# Patient Record
Sex: FEMALE | Race: WHITE | Employment: OTHER | URBAN - NONMETROPOLITAN AREA
[De-identification: names, ages, dates, MRNs, and addresses within clinical notes are randomized per-mention and may not be internally consistent; named-entity substitution may affect disease eponyms.]

---

## 2017-05-10 DIAGNOSIS — F32.A DEPRESSION, UNSPECIFIED DEPRESSION TYPE: ICD-10-CM

## 2017-05-10 DIAGNOSIS — F41.9 ANXIETY: ICD-10-CM

## 2017-05-10 DIAGNOSIS — E78.5 HYPERLIPIDEMIA, UNSPECIFIED HYPERLIPIDEMIA TYPE: Primary | ICD-10-CM

## 2017-05-10 RX ORDER — ATORVASTATIN CALCIUM 20 MG/1
20 TABLET, FILM COATED ORAL DAILY
Qty: 90 TABLET | Refills: 1 | Status: SHIPPED | OUTPATIENT
Start: 2017-05-10 | End: 2017-11-24 | Stop reason: SDUPTHER

## 2017-05-10 RX ORDER — LORAZEPAM 1 MG/1
TABLET ORAL
Qty: 40 TABLET | Refills: 0 | Status: SHIPPED | OUTPATIENT
Start: 2017-05-10 | End: 2017-10-10 | Stop reason: SDUPTHER

## 2017-05-10 RX ORDER — BUPROPION HYDROCHLORIDE 150 MG/1
150 TABLET, EXTENDED RELEASE ORAL 2 TIMES DAILY
Qty: 60 TABLET | Refills: 0 | Status: SHIPPED | OUTPATIENT
Start: 2017-05-10 | End: 2017-05-31 | Stop reason: DRUGHIGH

## 2017-05-31 ENCOUNTER — OFFICE VISIT (OUTPATIENT)
Dept: FAMILY MEDICINE CLINIC | Age: 62
End: 2017-05-31
Payer: COMMERCIAL

## 2017-05-31 VITALS
SYSTOLIC BLOOD PRESSURE: 140 MMHG | OXYGEN SATURATION: 96 % | BODY MASS INDEX: 50.02 KG/M2 | WEIGHT: 293 LBS | DIASTOLIC BLOOD PRESSURE: 90 MMHG | HEIGHT: 64 IN | HEART RATE: 84 BPM

## 2017-05-31 DIAGNOSIS — R10.2 PELVIC PAIN IN FEMALE: ICD-10-CM

## 2017-05-31 DIAGNOSIS — Z12.11 ENCOUNTER FOR SCREENING COLONOSCOPY: Primary | ICD-10-CM

## 2017-05-31 DIAGNOSIS — F32.A DEPRESSION, UNSPECIFIED DEPRESSION TYPE: ICD-10-CM

## 2017-05-31 DIAGNOSIS — M25.511 ACUTE PAIN OF RIGHT SHOULDER: ICD-10-CM

## 2017-05-31 PROCEDURE — 99396 PREV VISIT EST AGE 40-64: CPT | Performed by: FAMILY MEDICINE

## 2017-05-31 RX ORDER — BUPROPION HYDROCHLORIDE 300 MG/1
300 TABLET ORAL EVERY MORNING
Qty: 90 TABLET | Refills: 0 | Status: SHIPPED | OUTPATIENT
Start: 2017-05-31 | End: 2017-09-07 | Stop reason: SDUPTHER

## 2017-05-31 RX ORDER — PREDNISONE 20 MG/1
TABLET ORAL
Qty: 15 TABLET | Refills: 0 | Status: SHIPPED | OUTPATIENT
Start: 2017-05-31 | End: 2018-06-06 | Stop reason: ALTCHOICE

## 2017-05-31 ASSESSMENT — PATIENT HEALTH QUESTIONNAIRE - PHQ9
1. LITTLE INTEREST OR PLEASURE IN DOING THINGS: 0
2. FEELING DOWN, DEPRESSED OR HOPELESS: 0
SUM OF ALL RESPONSES TO PHQ9 QUESTIONS 1 & 2: 0
SUM OF ALL RESPONSES TO PHQ QUESTIONS 1-9: 0

## 2017-06-14 DIAGNOSIS — M25.511 CHRONIC PAIN OF BOTH SHOULDERS: Primary | ICD-10-CM

## 2017-06-14 DIAGNOSIS — M25.512 CHRONIC PAIN OF BOTH SHOULDERS: Primary | ICD-10-CM

## 2017-06-14 DIAGNOSIS — G89.29 CHRONIC PAIN OF BOTH SHOULDERS: Primary | ICD-10-CM

## 2017-06-15 DIAGNOSIS — M25.511 CHRONIC RIGHT SHOULDER PAIN: Primary | ICD-10-CM

## 2017-06-15 DIAGNOSIS — G89.29 CHRONIC RIGHT SHOULDER PAIN: Primary | ICD-10-CM

## 2017-06-21 ENCOUNTER — OFFICE VISIT (OUTPATIENT)
Dept: ORTHOPEDIC SURGERY | Age: 62
End: 2017-06-21
Payer: COMMERCIAL

## 2017-06-21 VITALS
WEIGHT: 293 LBS | DIASTOLIC BLOOD PRESSURE: 78 MMHG | BODY MASS INDEX: 51.91 KG/M2 | HEIGHT: 63 IN | HEART RATE: 88 BPM | SYSTOLIC BLOOD PRESSURE: 148 MMHG

## 2017-06-21 DIAGNOSIS — M75.101 ROTATOR CUFF SYNDROME OF RIGHT SHOULDER: Primary | ICD-10-CM

## 2017-06-21 PROCEDURE — 99203 OFFICE O/P NEW LOW 30 MIN: CPT | Performed by: FAMILY MEDICINE

## 2017-07-08 DIAGNOSIS — I10 ESSENTIAL HYPERTENSION: Primary | ICD-10-CM

## 2017-07-10 RX ORDER — LOSARTAN POTASSIUM 25 MG/1
TABLET ORAL
Qty: 90 TABLET | Refills: 3 | Status: SHIPPED | OUTPATIENT
Start: 2017-07-10 | End: 2018-10-27 | Stop reason: SDUPTHER

## 2017-08-24 DIAGNOSIS — E78.5 HYPERLIPIDEMIA, UNSPECIFIED HYPERLIPIDEMIA TYPE: ICD-10-CM

## 2017-08-24 RX ORDER — ATORVASTATIN CALCIUM 20 MG/1
20 TABLET, FILM COATED ORAL DAILY
Qty: 90 TABLET | Refills: 1 | Status: CANCELLED | OUTPATIENT
Start: 2017-08-24

## 2017-09-07 DIAGNOSIS — F32.A DEPRESSION, UNSPECIFIED DEPRESSION TYPE: ICD-10-CM

## 2017-09-08 RX ORDER — BUPROPION HYDROCHLORIDE 300 MG/1
300 TABLET ORAL EVERY MORNING
Qty: 90 TABLET | Refills: 0 | Status: SHIPPED | OUTPATIENT
Start: 2017-09-08 | End: 2017-12-21 | Stop reason: SDUPTHER

## 2017-10-05 DIAGNOSIS — R60.0 BILATERAL EDEMA OF LOWER EXTREMITY: ICD-10-CM

## 2017-10-06 RX ORDER — FUROSEMIDE 20 MG/1
TABLET ORAL
Qty: 90 TABLET | Refills: 1 | Status: SHIPPED | OUTPATIENT
Start: 2017-10-06 | End: 2018-06-06 | Stop reason: SDUPTHER

## 2017-10-10 DIAGNOSIS — F41.9 ANXIETY: ICD-10-CM

## 2017-10-10 RX ORDER — LORAZEPAM 1 MG/1
TABLET ORAL
Qty: 40 TABLET | Refills: 0 | Status: SHIPPED | OUTPATIENT
Start: 2017-10-10 | End: 2018-06-06 | Stop reason: SDUPTHER

## 2017-10-10 NOTE — TELEPHONE ENCOUNTER
Pt tom'd her 8-30-17 appt d/t transportation problems & has not re-scheduled. OARRS reviewed. Lorazepam last filled 5-16-17 for #40.

## 2017-10-10 NOTE — TELEPHONE ENCOUNTER
Controlled Substances Monitoring:     Attestation: The Prescription Monitoring Report for this patient was reviewed today.  (Darryn Medrano, DO)  Documentation: No signs of potential drug abuse or diversion identified. (Darryn Medrano, DO)

## 2017-11-09 ENCOUNTER — TELEPHONE (OUTPATIENT)
Dept: PRIMARY CARE CLINIC | Age: 62
End: 2017-11-09

## 2017-11-09 DIAGNOSIS — Z12.31 SCREENING MAMMOGRAM, ENCOUNTER FOR: Primary | ICD-10-CM

## 2017-11-24 DIAGNOSIS — E78.5 HYPERLIPIDEMIA, UNSPECIFIED HYPERLIPIDEMIA TYPE: ICD-10-CM

## 2017-11-27 RX ORDER — ATORVASTATIN CALCIUM 20 MG/1
TABLET, FILM COATED ORAL
Qty: 90 TABLET | Refills: 1 | Status: SHIPPED | OUTPATIENT
Start: 2017-11-27 | End: 2018-07-12 | Stop reason: SDUPTHER

## 2017-11-27 NOTE — TELEPHONE ENCOUNTER
Called the patient to inform her that she is due for an appointment. She stated that she does not want to schedule at this time for finical reasons.

## 2017-12-15 DIAGNOSIS — F32.A DEPRESSION, UNSPECIFIED DEPRESSION TYPE: ICD-10-CM

## 2017-12-15 DIAGNOSIS — I10 ESSENTIAL HYPERTENSION, BENIGN: ICD-10-CM

## 2017-12-15 NOTE — TELEPHONE ENCOUNTER
Called the patient to see if she had enough atenolol to last over the weekend and for the 10 days it takes from the mail order since the last prescription was sent in 09/2016 for 90 tablets and 3 refills. Appears the patient is not taking the medication, or not taking it as directed.

## 2017-12-20 RX ORDER — ATENOLOL 50 MG/1
TABLET ORAL
Qty: 90 TABLET | Refills: 3 | OUTPATIENT
Start: 2017-12-20

## 2017-12-20 RX ORDER — BUPROPION HYDROCHLORIDE 300 MG/1
TABLET ORAL
Qty: 90 TABLET | Refills: 0 | OUTPATIENT
Start: 2017-12-20

## 2017-12-21 RX ORDER — BUPROPION HYDROCHLORIDE 300 MG/1
300 TABLET ORAL EVERY MORNING
Qty: 14 TABLET | Refills: 0 | Status: SHIPPED | OUTPATIENT
Start: 2017-12-21 | End: 2017-12-21 | Stop reason: SDUPTHER

## 2017-12-21 RX ORDER — ATENOLOL 50 MG/1
50 TABLET ORAL DAILY
Qty: 14 TABLET | Refills: 0 | Status: SHIPPED | OUTPATIENT
Start: 2017-12-21 | End: 2017-12-21 | Stop reason: SDUPTHER

## 2017-12-21 RX ORDER — BUPROPION HYDROCHLORIDE 300 MG/1
300 TABLET ORAL EVERY MORNING
Qty: 90 TABLET | Refills: 0 | Status: SHIPPED | OUTPATIENT
Start: 2017-12-21 | End: 2018-04-27 | Stop reason: SDUPTHER

## 2017-12-21 RX ORDER — ATENOLOL 50 MG/1
TABLET ORAL
Qty: 90 TABLET | Refills: 0 | Status: SHIPPED | OUTPATIENT
Start: 2017-12-21 | End: 2018-04-17 | Stop reason: SDUPTHER

## 2017-12-21 NOTE — TELEPHONE ENCOUNTER
Patient called back. States she only has 3 tablets left. She is taking the atenolol. Patient's  insurance changed and everything was messed up with [facility]. She said she received 3 or 4 bottles at once d/t the mess up. Also looks as if patient had received a 90 day supply and no refills of the Wellbutrin. Atenolol had 90 days with 3 refills which would give her a 1 year supply. She would is due. Please send in short term script to [facility]. Long term scripts for both to [facility].

## 2018-04-17 DIAGNOSIS — I10 ESSENTIAL HYPERTENSION, BENIGN: ICD-10-CM

## 2018-04-17 RX ORDER — ATENOLOL 50 MG/1
TABLET ORAL
Qty: 90 TABLET | Refills: 0 | Status: SHIPPED | OUTPATIENT
Start: 2018-04-17 | End: 2018-07-12 | Stop reason: SDUPTHER

## 2018-04-27 DIAGNOSIS — F32.A DEPRESSION, UNSPECIFIED DEPRESSION TYPE: ICD-10-CM

## 2018-04-27 RX ORDER — BUPROPION HYDROCHLORIDE 300 MG/1
TABLET ORAL
Qty: 90 TABLET | Refills: 0 | Status: SHIPPED | OUTPATIENT
Start: 2018-04-27 | End: 2018-07-12 | Stop reason: SDUPTHER

## 2018-06-06 ENCOUNTER — OFFICE VISIT (OUTPATIENT)
Dept: FAMILY MEDICINE CLINIC | Age: 63
End: 2018-06-06
Payer: COMMERCIAL

## 2018-06-06 VITALS
BODY MASS INDEX: 56.05 KG/M2 | HEART RATE: 77 BPM | TEMPERATURE: 97.9 F | DIASTOLIC BLOOD PRESSURE: 88 MMHG | SYSTOLIC BLOOD PRESSURE: 128 MMHG | WEIGHT: 293 LBS | RESPIRATION RATE: 16 BRPM | OXYGEN SATURATION: 99 %

## 2018-06-06 DIAGNOSIS — G89.29 CHRONIC BILATERAL LOW BACK PAIN, WITH SCIATICA PRESENCE UNSPECIFIED: ICD-10-CM

## 2018-06-06 DIAGNOSIS — R60.0 BILATERAL EDEMA OF LOWER EXTREMITY: ICD-10-CM

## 2018-06-06 DIAGNOSIS — M54.5 CHRONIC BILATERAL LOW BACK PAIN, WITH SCIATICA PRESENCE UNSPECIFIED: ICD-10-CM

## 2018-06-06 DIAGNOSIS — F41.9 ANXIETY: ICD-10-CM

## 2018-06-06 DIAGNOSIS — L98.9 LESION OF SKIN OF WRIST: ICD-10-CM

## 2018-06-06 DIAGNOSIS — Z23 NEED FOR SHINGLES VACCINE: ICD-10-CM

## 2018-06-06 DIAGNOSIS — I10 ESSENTIAL HYPERTENSION: Primary | ICD-10-CM

## 2018-06-06 DIAGNOSIS — E78.5 HYPERLIPIDEMIA, UNSPECIFIED HYPERLIPIDEMIA TYPE: ICD-10-CM

## 2018-06-06 DIAGNOSIS — M17.12 PRIMARY OSTEOARTHRITIS OF LEFT KNEE: ICD-10-CM

## 2018-06-06 PROCEDURE — 99214 OFFICE O/P EST MOD 30 MIN: CPT | Performed by: FAMILY MEDICINE

## 2018-06-06 RX ORDER — FUROSEMIDE 20 MG/1
TABLET ORAL
Qty: 90 TABLET | Refills: 3 | Status: SHIPPED | OUTPATIENT
Start: 2018-06-06

## 2018-06-06 RX ORDER — LORAZEPAM 1 MG/1
TABLET ORAL
Qty: 30 TABLET | Refills: 0 | Status: SHIPPED | OUTPATIENT
Start: 2018-06-06 | End: 2018-10-29 | Stop reason: SDUPTHER

## 2018-06-06 RX ORDER — NAPROXEN 500 MG/1
500 TABLET ORAL 2 TIMES DAILY PRN
Qty: 180 TABLET | Refills: 1 | Status: SHIPPED | OUTPATIENT
Start: 2018-06-06 | End: 2019-09-04 | Stop reason: ALTCHOICE

## 2018-06-06 ASSESSMENT — ENCOUNTER SYMPTOMS: BACK PAIN: 1

## 2018-07-02 ENCOUNTER — TELEPHONE (OUTPATIENT)
Dept: FAMILY MEDICINE CLINIC | Age: 63
End: 2018-07-02

## 2018-07-02 NOTE — LETTER
[facility] [address] [place] [address]  Phone: [phone]  Fax: [address], DO        July 3, 2018     Patient: Titus Holbrook   YOB: 1955       To Whom it May Concern:    Zulema Murray is currently a patient under my care. She is being treated for chronic lumbar pain, bilateral lower extremity edema, and depression/anxiety. At this time, I would not recommend that Brandon Flight be considered for jury duty. She is unable to sit for prolonged periods of time due to her chronic back pain, and needs to be able to keep her lower legs elevated to control her edema. She is also under a great deal of emotional stress, making her depression/anxiety not well-controlled at this time. If you have any questions or concerns, please don't hesitate to call.     Sincerely,         Jesika Veras DO

## 2018-07-10 ENCOUNTER — HOSPITAL ENCOUNTER (OUTPATIENT)
Dept: LAB | Age: 63
Setting detail: SPECIMEN
Discharge: HOME | End: 2018-07-10
Payer: COMMERCIAL

## 2018-07-10 ENCOUNTER — HOSPITAL ENCOUNTER (OUTPATIENT)
Dept: MAMMOGRAPHY | Age: 63
Discharge: HOME | End: 2018-07-12
Payer: COMMERCIAL

## 2018-07-10 DIAGNOSIS — Z12.31 SCREENING MAMMOGRAM, ENCOUNTER FOR: ICD-10-CM

## 2018-07-10 DIAGNOSIS — E78.5 HYPERLIPIDEMIA, UNSPECIFIED HYPERLIPIDEMIA TYPE: ICD-10-CM

## 2018-07-10 DIAGNOSIS — I10 ESSENTIAL HYPERTENSION: ICD-10-CM

## 2018-07-10 LAB
ALBUMIN SERPL-MCNC: 3.9 G/DL (ref 3.5–5.2)
ALBUMIN/GLOBULIN RATIO: 1.1 (ref 1–2.5)
ALP BLD-CCNC: 86 U/L (ref 35–104)
ALT SERPL-CCNC: 24 U/L (ref 5–33)
ANION GAP SERPL CALCULATED.3IONS-SCNC: 10 MMOL/L (ref 9–17)
AST SERPL-CCNC: 22 U/L
BILIRUB SERPL-MCNC: 0.4 MG/DL (ref 0.3–1.2)
BUN BLDV-MCNC: 15 MG/DL (ref 8–23)
BUN/CREAT BLD: 19 (ref 9–20)
CALCIUM SERPL-MCNC: 9.5 MG/DL (ref 8.6–10.4)
CHLORIDE BLD-SCNC: 103 MMOL/L (ref 98–107)
CHOLESTEROL/HDL RATIO: 5.1
CHOLESTEROL: 192 MG/DL
CO2: 28 MMOL/L (ref 20–31)
CREAT SERPL-MCNC: 0.77 MG/DL (ref 0.5–0.9)
GFR AFRICAN AMERICAN: >60 ML/MIN
GFR NON-AFRICAN AMERICAN: >60 ML/MIN
GFR SERPL CREATININE-BSD FRML MDRD: ABNORMAL ML/MIN/{1.73_M2}
GFR SERPL CREATININE-BSD FRML MDRD: ABNORMAL ML/MIN/{1.73_M2}
GLUCOSE BLD-MCNC: 108 MG/DL (ref 70–99)
HDLC SERPL-MCNC: 38 MG/DL
LDL CHOLESTEROL: 123 MG/DL (ref 0–130)
POTASSIUM SERPL-SCNC: 4.8 MMOL/L (ref 3.7–5.3)
SODIUM BLD-SCNC: 141 MMOL/L (ref 135–144)
TOTAL PROTEIN: 7.3 G/DL (ref 6.4–8.3)
TRIGL SERPL-MCNC: 154 MG/DL
VLDLC SERPL CALC-MCNC: ABNORMAL MG/DL (ref 1–30)

## 2018-07-10 PROCEDURE — 77063 BREAST TOMOSYNTHESIS BI: CPT

## 2018-07-10 PROCEDURE — 36415 COLL VENOUS BLD VENIPUNCTURE: CPT

## 2018-07-10 PROCEDURE — 80053 COMPREHEN METABOLIC PANEL: CPT

## 2018-07-10 PROCEDURE — 80061 LIPID PANEL: CPT

## 2018-07-12 DIAGNOSIS — E78.5 HYPERLIPIDEMIA, UNSPECIFIED HYPERLIPIDEMIA TYPE: ICD-10-CM

## 2018-07-12 DIAGNOSIS — I10 ESSENTIAL HYPERTENSION, BENIGN: ICD-10-CM

## 2018-07-12 DIAGNOSIS — F32.A DEPRESSION, UNSPECIFIED DEPRESSION TYPE: ICD-10-CM

## 2018-07-12 RX ORDER — ATORVASTATIN CALCIUM 20 MG/1
20 TABLET, FILM COATED ORAL DAILY
Qty: 90 TABLET | Refills: 3 | Status: SHIPPED | OUTPATIENT
Start: 2018-07-12 | End: 2019-07-16 | Stop reason: SDUPTHER

## 2018-07-12 RX ORDER — ATENOLOL 50 MG/1
50 TABLET ORAL DAILY
Qty: 90 TABLET | Refills: 3 | Status: SHIPPED | OUTPATIENT
Start: 2018-07-12 | End: 2019-09-04 | Stop reason: SDUPTHER

## 2018-07-12 RX ORDER — BUPROPION HYDROCHLORIDE 300 MG/1
300 TABLET ORAL EVERY MORNING
Qty: 90 TABLET | Refills: 3 | Status: SHIPPED | OUTPATIENT
Start: 2018-07-12 | End: 2019-07-16 | Stop reason: SDUPTHER

## 2018-10-27 DIAGNOSIS — I10 ESSENTIAL HYPERTENSION: ICD-10-CM

## 2018-10-29 DIAGNOSIS — F41.9 ANXIETY: ICD-10-CM

## 2018-10-29 RX ORDER — LOSARTAN POTASSIUM 25 MG/1
TABLET ORAL
Qty: 90 TABLET | Refills: 1 | Status: SHIPPED | OUTPATIENT
Start: 2018-10-29 | End: 2019-07-26 | Stop reason: SDUPTHER

## 2018-10-29 RX ORDER — LORAZEPAM 1 MG/1
TABLET ORAL
Qty: 30 TABLET | Refills: 0 | Status: SHIPPED | OUTPATIENT
Start: 2018-10-29 | End: 2018-11-27

## 2018-10-29 NOTE — TELEPHONE ENCOUNTER
Last appt 6-6-18. Next appt 12-5-18. OARRS reviewed. Lorazepam last filled 6-14-18 for #30 through [address]

## 2019-07-16 DIAGNOSIS — E78.5 HYPERLIPIDEMIA, UNSPECIFIED HYPERLIPIDEMIA TYPE: ICD-10-CM

## 2019-07-16 DIAGNOSIS — F32.A DEPRESSION, UNSPECIFIED DEPRESSION TYPE: ICD-10-CM

## 2019-07-20 RX ORDER — BUPROPION HYDROCHLORIDE 300 MG/1
TABLET ORAL
Qty: 90 TABLET | Refills: 0 | Status: SHIPPED | OUTPATIENT
Start: 2019-07-20 | End: 2019-09-04 | Stop reason: SDUPTHER

## 2019-07-20 RX ORDER — ATORVASTATIN CALCIUM 20 MG/1
TABLET, FILM COATED ORAL
Qty: 90 TABLET | Refills: 0 | Status: SHIPPED | OUTPATIENT
Start: 2019-07-20 | End: 2019-09-04 | Stop reason: SDUPTHER

## 2019-07-26 DIAGNOSIS — I10 ESSENTIAL HYPERTENSION: ICD-10-CM

## 2019-07-30 RX ORDER — LOSARTAN POTASSIUM 25 MG/1
TABLET ORAL
Qty: 90 TABLET | Refills: 0 | Status: SHIPPED | OUTPATIENT
Start: 2019-07-30 | End: 2019-09-04 | Stop reason: SDUPTHER

## 2019-09-04 ENCOUNTER — OFFICE VISIT (OUTPATIENT)
Dept: FAMILY MEDICINE CLINIC | Age: 64
End: 2019-09-04
Payer: MEDICARE

## 2019-09-04 VITALS
WEIGHT: 293 LBS | OXYGEN SATURATION: 97 % | HEIGHT: 63 IN | BODY MASS INDEX: 51.91 KG/M2 | DIASTOLIC BLOOD PRESSURE: 78 MMHG | SYSTOLIC BLOOD PRESSURE: 122 MMHG | HEART RATE: 85 BPM

## 2019-09-04 DIAGNOSIS — Z23 NEED FOR PROPHYLACTIC VACCINATION AND INOCULATION AGAINST VARICELLA: ICD-10-CM

## 2019-09-04 DIAGNOSIS — Z23 NEED FOR PROPHYLACTIC VACCINATION AGAINST DIPHTHERIA-TETANUS-PERTUSSIS (DTP): ICD-10-CM

## 2019-09-04 DIAGNOSIS — F32.A DEPRESSION, UNSPECIFIED DEPRESSION TYPE: Primary | ICD-10-CM

## 2019-09-04 DIAGNOSIS — E66.01 MORBID OBESITY WITH BMI OF 50.0-59.9, ADULT (HCC): ICD-10-CM

## 2019-09-04 DIAGNOSIS — Z00.00 ROUTINE GENERAL MEDICAL EXAMINATION AT A HEALTH CARE FACILITY: ICD-10-CM

## 2019-09-04 DIAGNOSIS — F41.9 ANXIETY: ICD-10-CM

## 2019-09-04 DIAGNOSIS — I10 ESSENTIAL HYPERTENSION: ICD-10-CM

## 2019-09-04 DIAGNOSIS — E78.5 HYPERLIPIDEMIA, UNSPECIFIED HYPERLIPIDEMIA TYPE: ICD-10-CM

## 2019-09-04 DIAGNOSIS — Z13.1 ENCOUNTER FOR SCREENING FOR DIABETES MELLITUS: ICD-10-CM

## 2019-09-04 DIAGNOSIS — M47.816 OSTEOARTHRITIS OF LUMBAR SPINE, UNSPECIFIED SPINAL OSTEOARTHRITIS COMPLICATION STATUS: ICD-10-CM

## 2019-09-04 PROCEDURE — G8427 DOCREV CUR MEDS BY ELIG CLIN: HCPCS | Performed by: FAMILY MEDICINE

## 2019-09-04 PROCEDURE — 1036F TOBACCO NON-USER: CPT | Performed by: FAMILY MEDICINE

## 2019-09-04 PROCEDURE — G0438 PPPS, INITIAL VISIT: HCPCS | Performed by: FAMILY MEDICINE

## 2019-09-04 PROCEDURE — 4040F PNEUMOC VAC/ADMIN/RCVD: CPT | Performed by: FAMILY MEDICINE

## 2019-09-04 PROCEDURE — 3017F COLORECTAL CA SCREEN DOC REV: CPT | Performed by: FAMILY MEDICINE

## 2019-09-04 PROCEDURE — G8417 CALC BMI ABV UP PARAM F/U: HCPCS | Performed by: FAMILY MEDICINE

## 2019-09-04 PROCEDURE — 99214 OFFICE O/P EST MOD 30 MIN: CPT | Performed by: FAMILY MEDICINE

## 2019-09-04 PROCEDURE — 1090F PRES/ABSN URINE INCON ASSESS: CPT | Performed by: FAMILY MEDICINE

## 2019-09-04 PROCEDURE — 1123F ACP DISCUSS/DSCN MKR DOCD: CPT | Performed by: FAMILY MEDICINE

## 2019-09-04 PROCEDURE — G8400 PT W/DXA NO RESULTS DOC: HCPCS | Performed by: FAMILY MEDICINE

## 2019-09-04 RX ORDER — LOSARTAN POTASSIUM 25 MG/1
TABLET ORAL
Qty: 90 TABLET | Refills: 1 | Status: SHIPPED | OUTPATIENT
Start: 2019-09-04 | End: 2020-07-01

## 2019-09-04 RX ORDER — LORAZEPAM 1 MG/1
1 TABLET ORAL DAILY PRN
Qty: 30 TABLET | Refills: 0 | Status: SHIPPED | OUTPATIENT
Start: 2019-09-04 | End: 2020-08-03 | Stop reason: SDUPTHER

## 2019-09-04 RX ORDER — BUPROPION HYDROCHLORIDE 300 MG/1
TABLET ORAL
Qty: 90 TABLET | Refills: 1 | Status: SHIPPED | OUTPATIENT
Start: 2019-09-04 | End: 2020-07-01

## 2019-09-04 RX ORDER — ATENOLOL 50 MG/1
50 TABLET ORAL DAILY
Qty: 90 TABLET | Refills: 1 | Status: SHIPPED | OUTPATIENT
Start: 2019-09-04 | End: 2020-07-01

## 2019-09-04 RX ORDER — ATORVASTATIN CALCIUM 20 MG/1
TABLET, FILM COATED ORAL
Qty: 90 TABLET | Refills: 1 | Status: SHIPPED | OUTPATIENT
Start: 2019-09-04 | End: 2020-07-01

## 2019-09-04 ASSESSMENT — PATIENT HEALTH QUESTIONNAIRE - PHQ9
SUM OF ALL RESPONSES TO PHQ QUESTIONS 1-9: 0
SUM OF ALL RESPONSES TO PHQ QUESTIONS 1-9: 0

## 2019-09-04 ASSESSMENT — LIFESTYLE VARIABLES: HOW OFTEN DO YOU HAVE A DRINK CONTAINING ALCOHOL: 0

## 2019-09-04 NOTE — PROGRESS NOTES
reflux disease)     Hyperlipidemia     Hypertension     Morbid obesity ([facility] [address]      Past Surgical History:   Procedure Laterality Date    APPENDECTOMY  1982    COLONOSCOPY  2005    CYSTOSCOPY  2005    HEMORRHOID SURGERY      HYSTERECTOMY  1984    partial - both ovaries remain; done for uterine fibroid    OTHER SURGICAL HISTORY      fissure repair     Family History   Problem Relation Age of Onset    Heart Disease Father     Heart Disease Brother         MI    Cancer Brother         stomach    Heart Disease Brother         CABG in his late 40's/early 52's    High Blood Pressure Mother     Cancer Maternal Grandmother         breast    High Blood Pressure Sister     Diabetes Paternal Grandmother     Lupus Other         Niece       CareTeam (Including outside providers/suppliers regularly involved in providing care):   Patient Care Team:  Janelle Gautam DO as PCP - General (Family Medicine)  Janelle Gautam DO as PCP - [facility] Empaneled Provider    Wt Readings from Last 3 Encounters:   09/04/19 (!) 308 lb (139.7 kg)   06/06/18 (!) 316 lb 6.4 oz (143.5 kg)   06/21/17 (!) 317 lb (143.8 kg)     Vitals:    09/04/19 1111   BP: 122/78   Pulse: 85   SpO2: 97%   Weight: (!) 308 lb (139.7 kg)   Height: 5' 3\" (1.6 m)     Body mass index is 54.56 kg/m². Based upon direct observation of the patient, evaluation of cognition reveals recent and remote memory intact. Patient's complete Health Risk Assessment and screening values have been reviewed and are found in Flowsheets. The following problems were reviewed today and where indicated follow up appointments were made and/or referrals ordered. Positive Risk Factor Screenings with Interventions:     General Health:  General  In general, how would you say your health is?: Good  In the past 7 days, have you experienced any of the following?  New or Increased Pain, New or Increased Fatigue, Loneliness, Social Isolation, Stress or Anger?: (!) New or Increased Pain, New or Increased Fatigue  Do you get the social and emotional support that you need?: Yes  Do you have a Living Will?: Yes  General Health Risk Interventions:  · Fatigue: will continue to monitor    Health Habits/Nutrition:  Health Habits/Nutrition  Do you exercise for at least 20 minutes 2-3 times per week?: Yes  Have you lost any weight without trying in the past 3 months?: No  Do you eat fewer than 2 meals per day?: No  Have you seen a dentist within the past year?: (!) No  Body mass index is 54.56 kg/m².   Health Habits/Nutrition Interventions:  · Dental exam overdue:  patient encouraged to make appointment with his/her dentist    Safety:  Safety  Do you have working smoke detectors?: (!) No(remodeling)  Have all throw rugs been removed or fastened?: Yes  Do you have non-slip mats or surfaces in all bathtubs/showers?: (!) No  Do all of your stairways have a railing or banister?: Yes  Are your doorways, halls and stairs free of clutter?: Yes  Do you always fasten your seatbelt when you are in a car?: (!) No  Safety Interventions:  · Home safety tips provided    Personalized Preventive Plan   Current Health Maintenance Status  Immunization History   Administered Date(s) Administered    DTaP 05/16/1995, 11/04/2008    Zoster Live (Zostavax) 10/09/2014        Health Maintenance   Topic Date Due    Diabetes screen  06/10/1994    Shingles Vaccine (2 of 3) 12/04/2014    Colon cancer screen colonoscopy  05/18/2015    Annual Wellness Visit (AWV)  06/10/2017    Cervical cancer screen  09/19/2017    DTaP/Tdap/Td vaccine (3 - Tdap) 11/04/2018    Potassium monitoring  07/10/2019    Creatinine monitoring  07/10/2019    Flu vaccine (1) 09/01/2019    DEXA (modify frequency per FRAX score)  06/10/2019    Pneumococcal 65+ years Vaccine (1 of 2 - PCV13) 09/04/2020 (Originally 6/10/2019)    Breast cancer screen  07/10/2020    Lipid screen  07/10/2023    Hepatitis C screen  Completed    HIV screen  Completed

## 2019-09-04 NOTE — PROGRESS NOTES
[place] [address] [address], [address] [place] [address]  [phone]      Yennifer Dolan is a 71 y.o. female who presents today for her medical conditions/complaints as noted below. Yennifer Dolan is c/o of Medicare AWV and Hypertension (1 year follow up)      HPI:     Pt here today for follow-up of depression and HTN; also here for MAW visit (see other note). Pt is eating more vegetables and less potatoes; also having smaller portions. Drinking mostly water at home. Will drink Pepsi or Coke slushie maybe twice per week; has alcoholic drinks when she eats out for dinner. Taking Wellbutrin  mg daily for depression - takes these most days, but admits that she sometimes forgets. Does feel that it is working well to keep her depression controlled. Last Ativan was 4 months ago; has 1 left in her bottle. Last filled 11/2018. Used these more in Feb-April, 2019 when she was having increased family stressors. Taking Lasix 20 mg as needed for b/l lower extremity edema - works well for her. Takes this maybe 4x's per week. Cannot take this when she is going shopping or going to be in the car for a long time. If she doesn't take this for a couple days, her L thigh will start to feel \"tight\" and R lower leg will visibly be swollen.     Taking Atenolol 50 mg daily and Cozaar 25 mg daily for HTN - stable. BP well-controlled today - 122/78.       Taking Lipitor 20 mg daily for HLD - stable. Last lipid panel in 4/2017 - all levels out of range, so pt was re-started on her Lipitor at that time.     Taking OTC Naproxen (1-2 at a time) as needed for her chronic back and knee pain. Works well for her, but does make her drowsy.       Taking ASA 81 mg daily.       Past Medical History:   Diagnosis Date    Chronic back pain     Depression     GERD (gastroesophageal reflux disease)     Hyperlipidemia     Hypertension     Morbid obesity ([address]       Past Surgical History:   Procedure Abdominal: Soft. Bowel sounds are normal. She exhibits no distension. There is no tenderness. Musculoskeletal: She exhibits no edema. Neurological: She is alert and oriented to person, place, and time. Skin: Skin is warm and dry. Psychiatric: She has a normal mood and affect. Assessment:       Diagnosis Orders   1. Depression, unspecified depression type  buPROPion (WELLBUTRIN XL) 300 MG extended release tablet   2. Anxiety  LORazepam (ATIVAN) 1 MG tablet   3. Essential hypertension  atenolol (TENORMIN) 50 MG tablet    losartan (COZAAR) 25 MG tablet    Comprehensive Metabolic Panel   4. Routine general medical examination at a health care facility     5. Hyperlipidemia, unspecified hyperlipidemia type  atorvastatin (LIPITOR) 20 MG tablet    Comprehensive Metabolic Panel    Lipid Panel   6. Osteoarthritis of lumbar spine, unspecified spinal osteoarthritis complication status  Handicap Placard MISC    Handicap Placard MISC   7. Encounter for screening for diabetes mellitus  Glucose, Fasting   8. Need for prophylactic vaccination against diphtheria-tetanus-pertussis (DTP)  Tdap (ADACEL) 5-2-15.5 LF-MCG/0.5 injection   9. Need for prophylactic vaccination and inoculation against varicella  zoster recombinant adjuvanted vaccine [facility]) 50 MCG/0.5ML SUSR injection       Plan:      Return in 6 months (on 3/4/2020) for Medicare Annual Wellness Visit in 1 year.     Orders Placed This Encounter   Procedures    Glucose, Fasting     Standing Status:   Future     Standing Expiration Date:   9/4/2020    Comprehensive Metabolic Panel     Standing Status:   Future     Standing Expiration Date:   9/4/2020    Lipid Panel     Standing Status:   Future     Standing Expiration Date:   9/4/2020     Order Specific Question:   Is Patient Fasting?/# of Hours     Answer:   12     Orders Placed This Encounter   Medications    Tdap (ADACEL) 5-2-15.5 LF-MCG/0.5 injection     Sig: Inject 0.5 mLs into the muscle once for 1

## 2019-09-05 DIAGNOSIS — F41.9 ANXIETY: ICD-10-CM

## 2019-09-05 RX ORDER — LORAZEPAM 1 MG/1
1 TABLET ORAL DAILY PRN
Qty: 30 TABLET | Refills: 0 | Status: CANCELLED | OUTPATIENT
Start: 2019-09-05 | End: 2019-10-05

## 2019-09-05 NOTE — TELEPHONE ENCOUNTER
Last Appt:  9/4/2019  Next Appt:   3/11/2020    Last Fill 11/4/2018 #30 for 30 days  Med contract needs to be signed     Med verified in [facility] [address]

## 2019-09-22 ASSESSMENT — ENCOUNTER SYMPTOMS: BACK PAIN: 1

## 2019-10-31 ENCOUNTER — HOSPITAL ENCOUNTER (OUTPATIENT)
Dept: LAB | Age: 64
Discharge: HOME | End: 2019-10-31
Payer: MEDICARE

## 2019-10-31 DIAGNOSIS — E78.5 HYPERLIPIDEMIA, UNSPECIFIED HYPERLIPIDEMIA TYPE: ICD-10-CM

## 2019-10-31 DIAGNOSIS — Z13.1 ENCOUNTER FOR SCREENING FOR DIABETES MELLITUS: ICD-10-CM

## 2019-10-31 DIAGNOSIS — I10 ESSENTIAL HYPERTENSION: ICD-10-CM

## 2019-10-31 LAB
ALBUMIN SERPL-MCNC: 3.9 G/DL (ref 3.5–5.2)
ALBUMIN/GLOBULIN RATIO: 1.2 (ref 1–2.5)
ALP BLD-CCNC: 72 U/L (ref 35–104)
ALT SERPL-CCNC: 23 U/L (ref 5–33)
ANION GAP SERPL CALCULATED.3IONS-SCNC: 12 MMOL/L (ref 9–17)
AST SERPL-CCNC: 21 U/L
BILIRUB SERPL-MCNC: 0.46 MG/DL (ref 0.3–1.2)
BUN BLDV-MCNC: 13 MG/DL (ref 8–23)
BUN/CREAT BLD: 17 (ref 9–20)
CALCIUM SERPL-MCNC: 9.4 MG/DL (ref 8.6–10.4)
CHLORIDE BLD-SCNC: 102 MMOL/L (ref 98–107)
CHOLESTEROL/HDL RATIO: 5.7
CHOLESTEROL: 200 MG/DL
CO2: 27 MMOL/L (ref 20–31)
CREAT SERPL-MCNC: 0.75 MG/DL (ref 0.5–0.9)
GFR AFRICAN AMERICAN: >60 ML/MIN
GFR NON-AFRICAN AMERICAN: >60 ML/MIN
GFR SERPL CREATININE-BSD FRML MDRD: ABNORMAL ML/MIN/{1.73_M2}
GFR SERPL CREATININE-BSD FRML MDRD: ABNORMAL ML/MIN/{1.73_M2}
GLUCOSE BLD-MCNC: 103 MG/DL (ref 70–99)
GLUCOSE FASTING: 103 MG/DL (ref 70–99)
HDLC SERPL-MCNC: 35 MG/DL
LDL CHOLESTEROL: 137 MG/DL (ref 0–130)
POTASSIUM SERPL-SCNC: 3.9 MMOL/L (ref 3.7–5.3)
SODIUM BLD-SCNC: 141 MMOL/L (ref 135–144)
TOTAL PROTEIN: 7.2 G/DL (ref 6.4–8.3)
TRIGL SERPL-MCNC: 140 MG/DL
VLDLC SERPL CALC-MCNC: ABNORMAL MG/DL (ref 1–30)

## 2019-10-31 PROCEDURE — 36415 COLL VENOUS BLD VENIPUNCTURE: CPT

## 2019-10-31 PROCEDURE — 80053 COMPREHEN METABOLIC PANEL: CPT

## 2019-10-31 PROCEDURE — 80061 LIPID PANEL: CPT

## 2019-11-06 ENCOUNTER — NURSE ONLY (OUTPATIENT)
Dept: LAB | Age: 64
End: 2019-11-06

## 2019-11-06 DIAGNOSIS — Z23 NEED FOR VACCINATION: Primary | ICD-10-CM

## 2020-03-11 ENCOUNTER — OFFICE VISIT (OUTPATIENT)
Dept: FAMILY MEDICINE CLINIC | Age: 65
End: 2020-03-11
Payer: MEDICARE

## 2020-03-11 VITALS
BODY MASS INDEX: 51.91 KG/M2 | SYSTOLIC BLOOD PRESSURE: 128 MMHG | HEART RATE: 80 BPM | HEIGHT: 63 IN | DIASTOLIC BLOOD PRESSURE: 70 MMHG | OXYGEN SATURATION: 98 % | WEIGHT: 293 LBS

## 2020-03-11 PROCEDURE — G8427 DOCREV CUR MEDS BY ELIG CLIN: HCPCS | Performed by: FAMILY MEDICINE

## 2020-03-11 PROCEDURE — 1036F TOBACCO NON-USER: CPT | Performed by: FAMILY MEDICINE

## 2020-03-11 PROCEDURE — 1123F ACP DISCUSS/DSCN MKR DOCD: CPT | Performed by: FAMILY MEDICINE

## 2020-03-11 PROCEDURE — 99214 OFFICE O/P EST MOD 30 MIN: CPT | Performed by: FAMILY MEDICINE

## 2020-03-11 PROCEDURE — G8400 PT W/DXA NO RESULTS DOC: HCPCS | Performed by: FAMILY MEDICINE

## 2020-03-11 PROCEDURE — G8417 CALC BMI ABV UP PARAM F/U: HCPCS | Performed by: FAMILY MEDICINE

## 2020-03-11 PROCEDURE — G8484 FLU IMMUNIZE NO ADMIN: HCPCS | Performed by: FAMILY MEDICINE

## 2020-03-11 PROCEDURE — 1090F PRES/ABSN URINE INCON ASSESS: CPT | Performed by: FAMILY MEDICINE

## 2020-03-11 PROCEDURE — 4040F PNEUMOC VAC/ADMIN/RCVD: CPT | Performed by: FAMILY MEDICINE

## 2020-03-11 PROCEDURE — 3017F COLORECTAL CA SCREEN DOC REV: CPT | Performed by: FAMILY MEDICINE

## 2020-03-11 ASSESSMENT — ENCOUNTER SYMPTOMS
CONSTIPATION: 0
VOMITING: 0
WHEEZING: 0
SHORTNESS OF BREATH: 0
BLOOD IN STOOL: 0
NAUSEA: 0
DIARRHEA: 0

## 2020-03-11 NOTE — PROGRESS NOTES
[address], [address] [place] [address]  [phone]      Estella Charles is a 71 y.o. female who presents today for her medical conditions/complaints as noted below. Estella Charles is c/o of 6 Month Follow-Up (depression, wellbutrin xl 300 mg- stable)      HPI:     Pt here today for 6 month follow-up of depression.     Pt is eating more vegetables and less potatoes; also having smaller portions. Drinking mostly water at home. Will drink Pepsi or Coke slushie maybe twice per week; has alcoholic drinks when she eats out for dinner.     Taking Wellbutrin  mg daily for depression, stable.     Last used Ativan during Christmas. Uses sparingly.       Only takes Lasix 20 mg as needed for b/l lower extremity edema. can go a month to a month and a half between use. Does elevate legs if sits for a long period. Taking Atenolol 50 mg daily and Cozaar 25 mg daily for HTN - stable.  BP well-controlled today - 128/70.       Taking Lipitor 20 mg daily for HLD - increased.  Last lipid panel in 10/31/2019 cholesterol at 200- all levels out of range.     Did bring in lower back xray on disk from chiropractor 2017. Pt requesting scooter due to not being able to walk long distances. Would like to obtain it from [facility]. [facility] already has a ramp for it at home. Per pt, is unable to do manual wheelchair due to decreased upper body strength. Would need someone to push her in wheelchair. Voices that she is unable to do stairs because of lower back pain and knee pain. Would like to get it prior to daughter's graduation. But leaves from [place] 3/20/20, is aware that this is a 3-4 week process.          Past Medical History:   Diagnosis Date    Chronic back pain     Depression     GERD (gastroesophageal reflux disease)     Hyperlipidemia     Hypertension     Morbid obesity ([address]       Past Surgical History:   Procedure Laterality Date    APPENDECTOMY  1982    COLONOSCOPY  2005    facility-administered medications for this visit. Allergies   Allergen Reactions    Benazepril Other (See Comments)     cough    Dynabac [Dirithromycin]     Lisinopril Other (See Comments)     Cough      Other      Generic Prozac       Health Maintenance   Topic Date Due    Colon cancer screen colonoscopy  05/18/2015    Cervical cancer screen  09/19/2017    DEXA (modify frequency per FRAX score)  06/10/2019    Pneumococcal 65+ years Vaccine (1 of 1 - PPSV23) 09/04/2020 (Originally 6/10/2019)    Flu vaccine (1) 03/11/2021 (Originally 9/1/2019)    Shingles Vaccine (2 of 3) 03/11/2021 (Originally 12/4/2014)    Breast cancer screen  07/10/2020    Annual Wellness Visit (AWV)  09/04/2020    Lipid screen  10/31/2020    Potassium monitoring  10/31/2020    Creatinine monitoring  10/31/2020    Diabetes screen  10/31/2022    DTaP/Tdap/Td vaccine (4 - Td) 11/06/2029    Hepatitis C screen  Completed    HIV screen  Completed    Hepatitis A vaccine  Aged Out    Hepatitis B vaccine  Aged Out    Hib vaccine  Aged Out    Meningococcal (ACWY) vaccine  Aged Out       Subjective:      Review of Systems   Constitutional: Negative for fever and unexpected weight change. HENT: Negative for hearing loss. Eyes: Negative for visual disturbance. Respiratory: Negative for shortness of breath and wheezing. Cardiovascular: Negative for chest pain, palpitations and leg swelling. Gastrointestinal: Negative for blood in stool, constipation, diarrhea, nausea and vomiting. Genitourinary: Negative for dysuria, frequency and hematuria. Musculoskeletal: Positive for myalgias (Is having left sided neck tightness. Will see the chiropractor to help lessen. ). Skin: Negative for rash and wound. Neurological: Negative for dizziness and light-headedness. Psychiatric/Behavioral: Negative for dysphoric mood and suicidal ideas. The patient is not nervous/anxious.         Objective:     Vitals:    03/11/20 1104 BP: 128/70   Pulse: 80   SpO2: 98%   Weight: (!) 303 lb (137.4 kg)   Height: 5' 3\" (1.6 m)     Physical Exam  Vitals signs and nursing note reviewed. Constitutional:       General: She is not in acute distress. Appearance: She is well-developed. HENT:      Head: Normocephalic and atraumatic. Right Ear: Tympanic membrane, ear canal and external ear normal.      Left Ear: Tympanic membrane, ear canal and external ear normal.      Nose: Nose normal.      Mouth/Throat:      Mouth: Mucous membranes are moist.      Pharynx: Oropharynx is clear. No posterior oropharyngeal erythema. Eyes:      Conjunctiva/sclera: Conjunctivae normal.   Neck:      Musculoskeletal: Neck supple. Cardiovascular:      Rate and Rhythm: Normal rate and regular rhythm. Heart sounds: Normal heart sounds. Pulmonary:      Effort: Pulmonary effort is normal. No respiratory distress. Breath sounds: Normal breath sounds. Abdominal:      General: Bowel sounds are normal. There is no distension. Palpations: Abdomen is soft. Tenderness: There is no abdominal tenderness. Musculoskeletal:         General: Swelling (L trapezius fullness noted superior to clavicle with tenderness over muscle and clavicle; no palpable mass) present. Right lower leg: No edema. Left lower leg: No edema. Skin:     General: Skin is warm and dry. Neurological:      Mental Status: She is alert and oriented to person, place, and time. Assessment:       Diagnosis Orders   1. Depression, unspecified depression type     2. Hyperlipidemia, unspecified hyperlipidemia type     3. Essential hypertension     4. Osteoarthritis of lumbar spine, unspecified spinal osteoarthritis complication status     5. Anxiety     6. Muscle spasm         Plan:      Return in about 8 months (around 11/2/2020) for follow-up. No orders of the defined types were placed in this encounter.     No orders of the defined types were placed in this

## 2020-06-11 ENCOUNTER — PATIENT MESSAGE (OUTPATIENT)
Dept: FAMILY MEDICINE CLINIC | Age: 65
End: 2020-06-11

## 2020-06-23 ENCOUNTER — PATIENT MESSAGE (OUTPATIENT)
Dept: FAMILY MEDICINE CLINIC | Age: 65
End: 2020-06-23

## 2020-06-23 NOTE — TELEPHONE ENCOUNTER
From: Adriana Burrell  To: Eliza Jean DO  Sent: 6/23/2020 1:47 PM EDT  Subject: Prescription Question    The right side of my gum is swollen and I was wondering if you could prescribe something for me to get the infection down. Wiith everything going on right now and I don't have a dentist I just want to get the infection down so nothing major happens down the road. I am also wondering what is happening with the scooter plans? Thank you  Andreea Pimentel.  [place]   [phone]

## 2020-07-01 RX ORDER — BUPROPION HYDROCHLORIDE 300 MG/1
TABLET ORAL
Qty: 90 TABLET | Refills: 0 | Status: SHIPPED | OUTPATIENT
Start: 2020-07-01 | End: 2020-10-14

## 2020-07-01 RX ORDER — LOSARTAN POTASSIUM 25 MG/1
TABLET ORAL
Qty: 90 TABLET | Refills: 0 | Status: SHIPPED | OUTPATIENT
Start: 2020-07-01 | End: 2020-10-14

## 2020-07-01 RX ORDER — ATENOLOL 50 MG/1
TABLET ORAL
Qty: 90 TABLET | Refills: 0 | Status: SHIPPED | OUTPATIENT
Start: 2020-07-01 | End: 2020-10-14

## 2020-07-01 RX ORDER — ATORVASTATIN CALCIUM 20 MG/1
TABLET, FILM COATED ORAL
Qty: 90 TABLET | Refills: 0 | Status: SHIPPED | OUTPATIENT
Start: 2020-07-01 | End: 2020-10-14

## 2020-07-07 ENCOUNTER — PATIENT MESSAGE (OUTPATIENT)
Dept: FAMILY MEDICINE CLINIC | Age: 65
End: 2020-07-07

## 2020-07-08 NOTE — TELEPHONE ENCOUNTER
From: Viviana Client  To: Maggy Reynolds DO  Sent: 7/7/2020 6:24 PM EDT  Subject: Prescription Question    Yes I would like to still get the scooter. I can come in anytime this month. What would be a good time? I will keep an eye open for your email on when the appointment is. Any time after 10 in the morning just make it on a wednesday please. I really perfer coming to [address] [place] [address]. Thank you for getting back with me. Sharlene      ----- Message -----   From:Magi Oneill DO   Sent:7/7/2020 7:24 AM EDT   To:Sharlene Roman   Subject:RE: Prescription Question      Kevin Mckee,    So sorry that I'm just getting back to you. I did not see your message, and then was out of the office from 6/25 - today. How is the gum area doing now? Also, I apologize, but with everything going on with Covid-19, the scooter process got halted, and now I believe that Medicare will make us have to have another visit for me to order it, as I believe I have to have seen you within 45 days of the order being submitted from the medical supply pharmacy. If you still want to get the scooter sooner rather than later, can you come in real quick sometime this month and we can re-start the process for that? Thanks, and sorry about that,  Dr. Chloe Sharp      ----- Message -----   From:Sharlene Roman   Sent:6/23/2020 1:47 PM EDT   To:Magi Oneill DO   Subject:Prescription Question    The right side of my gum is swollen and I was wondering if you could prescribe something for me to get the infection down. Wiith everything going on right now and I don't have a dentist I just want to get the infection down so nothing major happens down the road. I am also wondering what is happening with the scooter plans? Thank you  Flavio Green.  Hazel Ramey   [phone]

## 2020-07-10 ENCOUNTER — PATIENT MESSAGE (OUTPATIENT)
Dept: FAMILY MEDICINE CLINIC | Age: 65
End: 2020-07-10

## 2020-07-10 NOTE — TELEPHONE ENCOUNTER
From: Franko Jones  To: Kaushik Lowery DO  Sent: 7/10/2020 1:02 PM EDT  Subject: Prescription Question    We have a virtual appointment scheduled for Wed. July 22 at 2:00. So I will see you then. Thank you  Priscilla Streeter      ----- Message -----   From:Magi Oneill DO   Sent:7/10/2020 7:41 AM EDT   To:Genevieve Claiborne Dandy   Subject:RE: Prescription Question      Sharlene,    I'm really sorry, but at this time, I'm not going to [facility] again yet. We'd have to do our appointment in [facility]. With that being said, do you still want a Wednesday only, or was that just so you could come on what was usually my [place] day? Dr. Xochitl Cristina      ----- Message -----   From:Genevieve Claiborne Dandy   Sent:7/7/2020 6:24 PM EDT   To:Magi Oneill DO   Subject:Prescription Question    Yes I would like to still get the scooter. I can come in anytime this month. What would be a good time? I will keep an eye open for your email on when the appointment is. Any time after 10 in the morning just make it on a wednesday please. I really perfer coming to [address] [place] [address]. Thank you for getting back with me. Sharlene      ----- Message -----   From:Magi Oneill DO   Sent:7/7/2020 7:24 AM EDT    To:Genevieve Claiborne Dandy   Subject:RE: Prescription Question      Priscilla Streeter,    So sorry that I'm just getting back to you. I did not see your message, and then was out of the office from 6/25 - today. How is the gum area doing now? Also, I apologize, but with everything going on with Covid-19, the scooter process got halted, and now I believe that Medicare will make us have to have another visit for me to order it, as I believe I have to have seen you within 45 days of the order being submitted from the medical supply pharmacy. If you still want to get the scooter sooner rather than later, can you come in real quick sometime this month and we can re-start the process for that?     Thanks, and sorry about that,  Dr. Xochitl Cristina      ----- Message -----

## 2020-07-22 ENCOUNTER — VIRTUAL VISIT (OUTPATIENT)
Dept: FAMILY MEDICINE CLINIC | Age: 65
End: 2020-07-22
Payer: MEDICARE

## 2020-07-22 PROCEDURE — 1123F ACP DISCUSS/DSCN MKR DOCD: CPT | Performed by: FAMILY MEDICINE

## 2020-07-22 PROCEDURE — 99214 OFFICE O/P EST MOD 30 MIN: CPT | Performed by: FAMILY MEDICINE

## 2020-07-22 PROCEDURE — 4040F PNEUMOC VAC/ADMIN/RCVD: CPT | Performed by: FAMILY MEDICINE

## 2020-07-22 PROCEDURE — 1090F PRES/ABSN URINE INCON ASSESS: CPT | Performed by: FAMILY MEDICINE

## 2020-07-22 PROCEDURE — 3017F COLORECTAL CA SCREEN DOC REV: CPT | Performed by: FAMILY MEDICINE

## 2020-07-22 PROCEDURE — G8400 PT W/DXA NO RESULTS DOC: HCPCS | Performed by: FAMILY MEDICINE

## 2020-07-22 PROCEDURE — G8427 DOCREV CUR MEDS BY ELIG CLIN: HCPCS | Performed by: FAMILY MEDICINE

## 2020-07-22 NOTE — PROGRESS NOTES
2020    TELEHEALTH EVALUATION -- Audio/Visual (During XDNXK-82 public health emergency)    HPI:    Jaret Terrazas (:  1955) has requested an audio/video evaluation for the following concern(s):    Pt seen today for a mobility evaluation and face-to-face (virtual) visit to discuss a power mobility device (scooter) to help her with ambulation. Pt has chronic history of significant b/l knee pain and chronic back pain with osteoarthritis, which is gradually worsening. She is also obese with a BMI of 53.7. She often has to use the assistance of others to help her while ambulating, as she feels unsteady and does not want to fall. She especially requires assistance when going in/out the front of her home. Her gait is slow and hesitant. Pt is also currently living only on the main floor of her home, as she has difficulty going up/down stairs due to joint/back pain and feeling of instability. She requires help from others to get her shoes/socks on at times. Pt cannot use a cane or walker, as she still has difficulty with knees giving out on her, and nearly falling. She is at high risk of falls due to this. She cannot use a manual wheelchair, as she also has chronic right scapula/shoulder pain and restricted ROM from an old injury. Patient does have the mental abilities to transfer to/from a motorized scooter and operate it safely in her home. She would be able to get herself physically on/off the device as well. Review of Systems   Constitutional: Negative for unexpected weight change. Musculoskeletal: Positive for arthralgias (b/l knee pain - chronic), back pain (chronic, lumbar) and gait problem. Prior to Visit Medications    Medication Sig Taking?  Authorizing Provider   buPROPion (WELLBUTRIN XL) 300 MG extended release tablet TAKE 1 TABLET EVERY MORNING Yes Eron Obando DO   losartan (COZAAR) 25 MG tablet TAKE 1 TABLET DAILY Yes Eron Obando, DO atorvastatin (LIPITOR) 20 MG tablet TAKE 1 TABLET DAILY Yes Meera Dempsey DO   atenolol (TENORMIN) 50 MG tablet TAKE 1 TABLET DAILY Yes Mariya Story DO   Handicap Placard MISC by Does not apply route Issue parking placard for person with disability; Applicant meets the qualifying disability criteria. Length of time expected to have disability__X___Lifetime. Prescription expires in 5 years from issuing date. Yes Bert Oneill,    Handicap Placard MISC by Does not apply route Issue parking placard for person with disability; Applicant meets the qualifying disability criteria. Length of time expected to have disability__X___Lifetime  Other ____. Prescription expires in 5 years from issuing date. Yes Bert Oneill DO   furosemide (LASIX) 20 MG tablet TAKE 1 TABLET DAILY  Patient not taking: Reported on 2020  Teodoro Hurtado DO       Social History     Tobacco Use    Smoking status: Former Smoker     Packs/day: 0.12     Types: Cigarettes     Start date: 2014     Last attempt to quit: 2016     Years since quittin.3    Smokeless tobacco: Never Used   Substance Use Topics    Alcohol use:  Yes     Alcohol/week: 0.0 standard drinks     Comment: social    Drug use: Not on file        Allergies   Allergen Reactions    Benazepril Other (See Comments)     cough    Dynabac [Dirithromycin]     Lisinopril Other (See Comments)     Cough      Other      Generic Prozac   ,   Past Medical History:   Diagnosis Date    Chronic back pain     Depression     GERD (gastroesophageal reflux disease)     Hyperlipidemia     Hypertension     Morbid obesity ([facility] [address]    ,   Past Surgical History:   Procedure Laterality Date    APPENDECTOMY      COLONOSCOPY      CYSTOSCOPY      HEMORRHOID SURGERY      HYSTERECTOMY      partial - both ovaries remain; done for uterine fibroid    OTHER SURGICAL HISTORY      fissure repair       PHYSICAL EXAMINATION:    Constitutional: [x] Appears well-developed and well-nourished [x] No apparent distress      [] Abnormal-   Mental status  [x] Alert and awake  [x] Oriented to person/place/time [x]Able to follow commands      Eyes:  EOM    [x]  Normal  [] Abnormal-  Sclera  []  Normal  [] Abnormal -         Discharge []  None visible  [] Abnormal -    HENT:   [x] Normocephalic, atraumatic. [] Abnormal   [] Mouth/Throat: Mucous membranes are moist.     External Ears [] Normal  [] Abnormal-     Neck: [] No visualized mass     Pulmonary/Chest: [x] Respiratory effort normal.  [x] No visualized signs of difficulty breathing or respiratory distress        [] Abnormal-      Musculoskeletal:   [] Normal gait with no signs of ataxia         [x] Normal range of motion of neck        [x] Abnormal- slow, slightly hunched, gait; pt walks carefully and hangs onto her     Neurological:        [x] No Facial Asymmetry (Cranial nerve 7 motor function) (limited exam to video visit)          [x] No gaze palsy        [] Abnormal-         Skin:        [x] No significant exanthematous lesions or discoloration noted on facial skin         [] Abnormal-            Psychiatric:       [x] Normal Affect [] No Hallucinations        [] Abnormal-     ASSESSMENT/PLAN:  1. Chronic pain of both knees    2. Osteoarthritis of lumbar spine, unspecified spinal osteoarthritis complication status    3. Morbid obesity with BMI of 50.0-59.9, adult ([facility] [address]    4. Anxiety  - LORazepam (ATIVAN) 1 MG tablet; Take 1 tablet by mouth daily as needed for Anxiety for up to 30 days. Dispense: 30 tablet; Refill: 0    5. Fatigue, unspecified type  - CBC Auto Differential; Future  - TSH With Reflex Ft4; Future    6. Vitamin D deficiency  - Vitamin D 25 Hydroxy; Future      Will send order and notes for motorized scooter to [facility] for patient. Recommend the use of this in her home to decrease risk of falls and injury, and increase ability to ambulate in the home for ADL's.       Return for Follow-up. Carlton Gonsalez is a 76 y.o. female being evaluated by a Virtual Visit (video visit) encounter to address concerns as mentioned above. A caregiver was present when appropriate. Due to this being a TeleHealth encounter (During PCYGB-55 public health emergency), evaluation of the following organ systems was limited: Vitals/Constitutional/EENT/Resp/CV/GI//MS/Neuro/Skin/Heme-Lymph-Imm. Pursuant to the emergency declaration under the [address] [place] [address], [address] [place] [address] authority and the [employer] Act, this Virtual Visit was conducted with patient's (and/or legal guardian's) consent, to reduce the patient's risk of exposure to COVID-19 and provide necessary medical care. The patient (and/or legal guardian) has also been advised to contact this office for worsening conditions or problems, and seek emergency medical treatment and/or call 911 if deemed necessary. Patient identification was verified at the start of the visit: Yes    Total time spent on this encounter: Not billed by time    Services were provided through a video synchronous discussion virtually to substitute for in-person clinic visit. Patient and provider were located at their individual homes. --Jose Gandhi DO on 9/3/2020 at 4:50 PM    An electronic signature was used to authenticate this note.

## 2020-07-25 ENCOUNTER — HOSPITAL ENCOUNTER (OUTPATIENT)
Dept: LAB | Age: 65
Discharge: HOME | End: 2020-07-25
Payer: MEDICARE

## 2020-07-25 LAB
ABSOLUTE EOS #: 0.2 K/UL (ref 0–0.44)
ABSOLUTE IMMATURE GRANULOCYTE: <0.03 K/UL (ref 0–0.3)
ABSOLUTE LYMPH #: 1.5 K/UL (ref 1.1–3.7)
ABSOLUTE MONO #: 0.49 K/UL (ref 0.1–1.2)
BASOPHILS # BLD: 0 % (ref 0–2)
BASOPHILS ABSOLUTE: <0.03 K/UL (ref 0–0.2)
DIFFERENTIAL TYPE: ABNORMAL
EOSINOPHILS RELATIVE PERCENT: 3 % (ref 1–4)
HCT VFR BLD CALC: 43.5 % (ref 36.3–47.1)
HEMOGLOBIN: 13.9 G/DL (ref 11.9–15.1)
IMMATURE GRANULOCYTES: 0 %
LYMPHOCYTES # BLD: 22 % (ref 24–43)
MCH RBC QN AUTO: 28.5 PG (ref 25.2–33.5)
MCHC RBC AUTO-ENTMCNC: 32 G/DL (ref 25.2–33.5)
MCV RBC AUTO: 89.3 FL (ref 82.6–102.9)
MONOCYTES # BLD: 7 % (ref 3–12)
NRBC AUTOMATED: 0 PER 100 WBC
PDW BLD-RTO: 12.9 % (ref 11.8–14.4)
PLATELET # BLD: 229 K/UL (ref 138–453)
PLATELET ESTIMATE: ABNORMAL
PMV BLD AUTO: 11.8 FL (ref 8.1–13.5)
RBC # BLD: 4.87 M/UL (ref 3.95–5.11)
RBC # BLD: ABNORMAL 10*6/UL
SEG NEUTROPHILS: 68 % (ref 36–65)
SEGMENTED NEUTROPHILS ABSOLUTE COUNT: 4.66 K/UL (ref 1.5–8.1)
THYROXINE, FREE: 0.94 NG/DL (ref 0.93–1.7)
TSH SERPL DL<=0.05 MIU/L-ACNC: 5.35 MIU/L (ref 0.3–5)
VITAMIN D 25-HYDROXY: 20.7 NG/ML (ref 30–100)
WBC # BLD: 6.9 K/UL (ref 3.5–11.3)
WBC # BLD: ABNORMAL 10*3/UL

## 2020-07-25 PROCEDURE — 85025 COMPLETE CBC W/AUTO DIFF WBC: CPT

## 2020-07-25 PROCEDURE — 84439 ASSAY OF FREE THYROXINE: CPT

## 2020-07-25 PROCEDURE — 82306 VITAMIN D 25 HYDROXY: CPT

## 2020-07-25 PROCEDURE — 36415 COLL VENOUS BLD VENIPUNCTURE: CPT

## 2020-07-25 PROCEDURE — 84443 ASSAY THYROID STIM HORMONE: CPT

## 2020-08-03 RX ORDER — LORAZEPAM 1 MG/1
1 TABLET ORAL DAILY PRN
Qty: 30 TABLET | Refills: 0 | Status: SHIPPED | OUTPATIENT
Start: 2020-08-03 | End: 2021-01-26

## 2020-08-24 ENCOUNTER — PATIENT MESSAGE (OUTPATIENT)
Dept: FAMILY MEDICINE CLINIC | Age: 65
End: 2020-08-24

## 2020-08-24 NOTE — TELEPHONE ENCOUNTER
From: Basilio Carroll  To: Juan A Hanna, DO  Sent: 8/24/2020 1:22 PM EDT  Subject: Visit Follow-Up Question    Julianne Hernandez    Things have happened that is causing me to be very emotional. I am not sure who all will read this so I am not willing to be completely forthcoming. I will tell you this after 29 years of being together (in one months time there has been a break up and a marriage that has happened. My stress is way bad. All I do is cry and I don't like it. I don't want to take the stress pills everyday but without them I can't calm down. Do you have any advice. If I know that it is only you that is reading this I will go into more detail. Should I take the stress pills until I can handle the situation?     Cyrilla Ganser

## 2020-09-01 ENCOUNTER — TELEPHONE (OUTPATIENT)
Dept: FAMILY MEDICINE CLINIC | Age: 65
End: 2020-09-01

## 2020-09-01 ENCOUNTER — PATIENT MESSAGE (OUTPATIENT)
Dept: FAMILY MEDICINE CLINIC | Age: 65
End: 2020-09-01

## 2020-09-01 NOTE — TELEPHONE ENCOUNTER
Pt calling stating at her 7-22 VV a motorized scooter was discussed and KB told pt she would send everything to [facility] for this but they have nothing, please advise pt of the status of this scooter.

## 2020-09-02 NOTE — TELEPHONE ENCOUNTER
From: Lyubov Mendez  To: Ayala Lewis DO  Sent: 9/1/2020 9:50 PM EDT  Subject: Prescription Question    Hello I called [facility] and they said that they knew nothing about a motorized scooter for me. That you didn't send in any paperwork. I don't know what I am supposed to do now. Can you help me? Thank you, Topher Quintero      ----- Message -----   From:Magi Oneill DO   Sent:7/10/2020 1:49 PM EDT   To:Sharlene Cerrato   Subject:RE: Prescription Question      Sounds perfect! See you then!      ----- Message -----   From:Sharlene Cerrato   Sent:7/10/2020 1:02 PM EDT   To:Magi Oneill DO   Subject:Prescription Question    We have a virtual appointment scheduled for Wed. July 22 at 2:00. So I will see you then. Thank you  Cortes Baker      ----- Message -----   From:Magi Oneill DO   Sent:7/10/2020 7:41 AM EDT   To:Sharlene Cerrato   Subject:RE: Prescription Question      Sharlene,    I'm really sorry, but at this time, I'm not going to [place] again yet. We'd have to do our appointment in [place]. With that being said, do you still want a Wednesday only, or was that just so you could come on what was usually my [place] day? Dr. Ashlyn Mary      ----- Message -----   From:Sharlene Cerrato   Sent:7/7/2020 6:24 PM EDT   To:Magi Oneill DO   Subject:Prescription Question    Yes I would like to still get the scooter. I can come in anytime this month. What would be a good time? I will keep an eye open for your email on when the appointment is. Any time after 10 in the morning just make it on a wednesday please. I really perfer coming to [address] [place] [address]. Thank you for getting back with me. Sharlene      ----- Message -----   From:Magi Oneill DO   Sent:7/7/2020 7:24 AM EDT   To:Sharlene Cerrato   Subject:RE: Prescription Question      Cortes Oats,    So sorry that I'm just getting back to you. I did not see your message, and then was out of the office from 6/25 - today.  How is the gum area doing now?    Also, I apologize, but with everything going on with Covid-19, the scooter process got halted, and now I believe that Medicare will make us have to have another visit for me to order it, as I believe I have to have seen you within 45 days of the order being submitted from the medical supply pharmacy. If you still want to get the scooter sooner rather than later, can you come in real quick sometime this month and we can re-start the process for that? Thanks, and sorry about that,  Dr. Melvin Kyle      ----- Message -----   From:Sharlene Sanches   Sent:6/23/2020 1:47 PM EDT   To:Magi Oneill, DO   Subject:Prescription Question    The right side of my gum is swollen and I was wondering if you could prescribe something for me to get the infection down. Wiith everything going on right now and I don't have a dentist I just want to get the infection down so nothing major happens down the road. I am also wondering what is happening with the scooter plans? Thank you  Karly Archer   [phone]

## 2020-09-03 ASSESSMENT — ENCOUNTER SYMPTOMS: BACK PAIN: 1

## 2020-10-14 RX ORDER — ATORVASTATIN CALCIUM 20 MG/1
TABLET, FILM COATED ORAL
Qty: 90 TABLET | Refills: 0 | Status: SHIPPED | OUTPATIENT
Start: 2020-10-14 | End: 2021-01-26

## 2020-10-14 RX ORDER — BUPROPION HYDROCHLORIDE 300 MG/1
TABLET ORAL
Qty: 90 TABLET | Refills: 3 | Status: SHIPPED | OUTPATIENT
Start: 2020-10-14 | End: 2021-11-03

## 2020-10-14 RX ORDER — ATENOLOL 50 MG/1
TABLET ORAL
Qty: 90 TABLET | Refills: 3 | Status: SHIPPED | OUTPATIENT
Start: 2020-10-14 | End: 2021-11-03

## 2020-10-14 RX ORDER — LOSARTAN POTASSIUM 25 MG/1
TABLET ORAL
Qty: 90 TABLET | Refills: 3 | Status: SHIPPED | OUTPATIENT
Start: 2020-10-14 | End: 2021-11-03

## 2020-12-23 ENCOUNTER — HOSPITAL ENCOUNTER (OUTPATIENT)
Dept: LAB | Age: 65
Discharge: HOME | End: 2020-12-23
Payer: MEDICARE

## 2020-12-23 LAB
ALBUMIN SERPL-MCNC: 3.9 G/DL (ref 3.5–5.2)
ALBUMIN/GLOBULIN RATIO: 1.1 (ref 1–2.5)
ALP BLD-CCNC: 76 U/L (ref 35–104)
ALT SERPL-CCNC: 17 U/L (ref 5–33)
ANION GAP SERPL CALCULATED.3IONS-SCNC: 11 MMOL/L (ref 9–17)
AST SERPL-CCNC: 19 U/L
BILIRUB SERPL-MCNC: 0.35 MG/DL (ref 0.3–1.2)
BUN BLDV-MCNC: 15 MG/DL (ref 8–23)
BUN/CREAT BLD: 19 (ref 9–20)
CALCIUM SERPL-MCNC: 9.9 MG/DL (ref 8.6–10.4)
CHLORIDE BLD-SCNC: 104 MMOL/L (ref 98–107)
CHOLESTEROL/HDL RATIO: 5.1
CHOLESTEROL: 208 MG/DL
CO2: 25 MMOL/L (ref 20–31)
CREAT SERPL-MCNC: 0.81 MG/DL (ref 0.5–0.9)
GFR AFRICAN AMERICAN: >60 ML/MIN
GFR NON-AFRICAN AMERICAN: >60 ML/MIN
GFR SERPL CREATININE-BSD FRML MDRD: ABNORMAL ML/MIN/{1.73_M2}
GFR SERPL CREATININE-BSD FRML MDRD: ABNORMAL ML/MIN/{1.73_M2}
GLUCOSE BLD-MCNC: 107 MG/DL (ref 70–99)
HDLC SERPL-MCNC: 41 MG/DL
LDL CHOLESTEROL: 139 MG/DL (ref 0–130)
POTASSIUM SERPL-SCNC: 4.2 MMOL/L (ref 3.7–5.3)
SODIUM BLD-SCNC: 140 MMOL/L (ref 135–144)
THYROXINE, FREE: 1.06 NG/DL (ref 0.93–1.7)
TOTAL PROTEIN: 7.3 G/DL (ref 6.4–8.3)
TRIGL SERPL-MCNC: 140 MG/DL
TSH SERPL DL<=0.05 MIU/L-ACNC: 6.92 MIU/L (ref 0.3–5)
VLDLC SERPL CALC-MCNC: ABNORMAL MG/DL (ref 1–30)

## 2020-12-23 PROCEDURE — 80053 COMPREHEN METABOLIC PANEL: CPT

## 2020-12-23 PROCEDURE — 84443 ASSAY THYROID STIM HORMONE: CPT

## 2020-12-23 PROCEDURE — 80061 LIPID PANEL: CPT

## 2020-12-23 PROCEDURE — 36415 COLL VENOUS BLD VENIPUNCTURE: CPT

## 2020-12-23 PROCEDURE — 84439 ASSAY OF FREE THYROXINE: CPT

## 2021-01-24 DIAGNOSIS — E78.5 HYPERLIPIDEMIA, UNSPECIFIED HYPERLIPIDEMIA TYPE: ICD-10-CM

## 2021-01-24 DIAGNOSIS — E55.9 VITAMIN D DEFICIENCY: ICD-10-CM

## 2021-01-24 DIAGNOSIS — E03.9 HYPOTHYROIDISM, UNSPECIFIED TYPE: Primary | ICD-10-CM

## 2021-01-24 RX ORDER — LEVOTHYROXINE SODIUM 0.03 MG/1
25 TABLET ORAL DAILY
Qty: 90 TABLET | Refills: 0 | Status: SHIPPED | OUTPATIENT
Start: 2021-01-24 | End: 2021-04-21

## 2021-01-26 DIAGNOSIS — F41.9 ANXIETY: ICD-10-CM

## 2021-01-26 DIAGNOSIS — E78.5 HYPERLIPIDEMIA, UNSPECIFIED HYPERLIPIDEMIA TYPE: ICD-10-CM

## 2021-01-26 NOTE — TELEPHONE ENCOUNTER
Lorazepam last filled Aug 5 [facility] for 30 tabs.

## 2021-01-28 RX ORDER — ATORVASTATIN CALCIUM 20 MG/1
TABLET, FILM COATED ORAL
Qty: 90 TABLET | Refills: 3 | Status: SHIPPED | OUTPATIENT
Start: 2021-01-28 | End: 2022-03-02

## 2021-01-28 RX ORDER — LORAZEPAM 1 MG/1
TABLET ORAL
Qty: 30 TABLET | Refills: 0 | Status: SHIPPED | OUTPATIENT
Start: 2021-01-28 | End: 2021-04-21

## 2021-01-28 NOTE — TELEPHONE ENCOUNTER
Controlled Substance Monitoring:    Acute and Chronic Pain Monitoring:   RX Monitoring 1/28/2021   Attestation -   Periodic Controlled Substance Monitoring No signs of potential drug abuse or diversion identified.

## 2021-02-01 NOTE — TELEPHONE ENCOUNTER
Paperwork was submitted to [facility], and pt has been able to receive her motorized scooter.

## 2021-04-20 DIAGNOSIS — E03.9 HYPOTHYROIDISM, UNSPECIFIED TYPE: ICD-10-CM

## 2021-04-20 DIAGNOSIS — F41.9 ANXIETY: ICD-10-CM

## 2021-04-21 RX ORDER — LORAZEPAM 1 MG/1
TABLET ORAL
Qty: 30 TABLET | Refills: 0 | Status: SHIPPED | OUTPATIENT
Start: 2021-04-21 | End: 2021-11-23 | Stop reason: SDUPTHER

## 2021-04-21 RX ORDER — LEVOTHYROXINE SODIUM 0.03 MG/1
TABLET ORAL
Qty: 90 TABLET | Refills: 0 | Status: SHIPPED | OUTPATIENT
Start: 2021-04-21 | End: 2021-05-11 | Stop reason: SINTOL

## 2021-04-22 NOTE — TELEPHONE ENCOUNTER
Controlled Substance Monitoring:    Acute and Chronic Pain Monitoring:   RX Monitoring 4/21/2021   Attestation -   Periodic Controlled Substance Monitoring No signs of potential drug abuse or diversion identified.

## 2021-05-11 ENCOUNTER — OFFICE VISIT (OUTPATIENT)
Dept: FAMILY MEDICINE CLINIC | Age: 66
End: 2021-05-11
Payer: MEDICARE

## 2021-05-11 VITALS
SYSTOLIC BLOOD PRESSURE: 132 MMHG | WEIGHT: 292 LBS | OXYGEN SATURATION: 98 % | DIASTOLIC BLOOD PRESSURE: 88 MMHG | HEIGHT: 63 IN | BODY MASS INDEX: 51.74 KG/M2 | HEART RATE: 80 BPM

## 2021-05-11 DIAGNOSIS — F32.A DEPRESSION, UNSPECIFIED DEPRESSION TYPE: ICD-10-CM

## 2021-05-11 DIAGNOSIS — Z12.31 ENCOUNTER FOR SCREENING MAMMOGRAM FOR BREAST CANCER: ICD-10-CM

## 2021-05-11 DIAGNOSIS — Z12.11 SCREEN FOR COLON CANCER: ICD-10-CM

## 2021-05-11 DIAGNOSIS — F41.9 ANXIETY: Primary | ICD-10-CM

## 2021-05-11 DIAGNOSIS — E03.9 HYPOTHYROIDISM, UNSPECIFIED TYPE: ICD-10-CM

## 2021-05-11 PROCEDURE — G8417 CALC BMI ABV UP PARAM F/U: HCPCS | Performed by: FAMILY MEDICINE

## 2021-05-11 PROCEDURE — G8400 PT W/DXA NO RESULTS DOC: HCPCS | Performed by: FAMILY MEDICINE

## 2021-05-11 PROCEDURE — G8427 DOCREV CUR MEDS BY ELIG CLIN: HCPCS | Performed by: FAMILY MEDICINE

## 2021-05-11 PROCEDURE — 99214 OFFICE O/P EST MOD 30 MIN: CPT | Performed by: FAMILY MEDICINE

## 2021-05-11 PROCEDURE — 99212 OFFICE O/P EST SF 10 MIN: CPT | Performed by: FAMILY MEDICINE

## 2021-05-11 PROCEDURE — 3017F COLORECTAL CA SCREEN DOC REV: CPT | Performed by: FAMILY MEDICINE

## 2021-05-11 PROCEDURE — 1123F ACP DISCUSS/DSCN MKR DOCD: CPT | Performed by: FAMILY MEDICINE

## 2021-05-11 PROCEDURE — 4040F PNEUMOC VAC/ADMIN/RCVD: CPT | Performed by: FAMILY MEDICINE

## 2021-05-11 PROCEDURE — 1090F PRES/ABSN URINE INCON ASSESS: CPT | Performed by: FAMILY MEDICINE

## 2021-05-11 PROCEDURE — 4004F PT TOBACCO SCREEN RCVD TLK: CPT | Performed by: FAMILY MEDICINE

## 2021-05-11 ASSESSMENT — PATIENT HEALTH QUESTIONNAIRE - PHQ9
SUM OF ALL RESPONSES TO PHQ QUESTIONS 1-9: 0
SUM OF ALL RESPONSES TO PHQ QUESTIONS 1-9: 0
SUM OF ALL RESPONSES TO PHQ9 QUESTIONS 1 & 2: 0
1. LITTLE INTEREST OR PLEASURE IN DOING THINGS: 0
2. FEELING DOWN, DEPRESSED OR HOPELESS: 0

## 2021-05-11 NOTE — PROGRESS NOTES
Brother         stomach or prostate    Heart Disease Brother         CABG in his late 40's/early 52's    High Blood Pressure Mother     Cancer Maternal Grandmother         breast    High Blood Pressure Sister     Diabetes Paternal Grandmother     Lupus Other         Niece     Social History     Tobacco Use    Smoking status: Current Some Day Smoker     Packs/day: 0.25     Years: 40.00     Pack years: 10.00     Types: Cigarettes     Start date: 2014     Last attempt to quit: 2016     Years since quittin.0    Smokeless tobacco: Never Used   Substance Use Topics    Alcohol use: Yes     Alcohol/week: 0.0 standard drinks     Comment: social      Current Outpatient Medications   Medication Sig Dispense Refill    atorvastatin (LIPITOR) 20 MG tablet TAKE 1 TABLET DAILY 90 tablet 3    buPROPion (WELLBUTRIN XL) 300 MG extended release tablet TAKE 1 TABLET EVERY MORNING 90 tablet 3    losartan (COZAAR) 25 MG tablet TAKE 1 TABLET DAILY 90 tablet 3    atenolol (TENORMIN) 50 MG tablet TAKE 1 TABLET DAILY 90 tablet 3    Handicap Placard MISC by Does not apply route Issue parking placard for person with disability; Applicant meets the qualifying disability criteria. Length of time expected to have disability__X___Lifetime. Prescription expires in 5 years from issuing date. 1 each 0    Handicap Placard MISC by Does not apply route Issue parking placard for person with disability; Applicant meets the qualifying disability criteria. Length of time expected to have disability__X___Lifetime  Other ____. Prescription expires in 5 years from issuing date. 1 each 0    furosemide (LASIX) 20 MG tablet TAKE 1 TABLET DAILY 90 tablet 3    Scooter MISC Use daily as needed. 1 each 0     No current facility-administered medications for this visit.      Allergies   Allergen Reactions    Benazepril Other (See Comments)     cough    Dynabac [Dirithromycin]     Levothyroxine Other (See Comments)     Caused leg swelling, headache, insomnia, nausea.  Lisinopril Other (See Comments)     Cough      Other      Generic Prozac       Health Maintenance   Topic Date Due    COVID-19 Vaccine (1) Never done    DEXA (modify frequency per FRAX score)  Never done    Colon cancer screen colonoscopy  05/18/2015    Annual Wellness Visit (AWV)  Never done    Breast cancer screen  07/10/2020    Flu vaccine (Season Ended) 09/01/2021    Pneumococcal 65+ years Vaccine (2 of 2 - PPSV23) 10/08/2021    Lipid screen  12/23/2021    Potassium monitoring  12/23/2021    Creatinine monitoring  12/23/2021    Diabetes screen  10/31/2022    DTaP/Tdap/Td vaccine (4 - Td) 11/06/2029    Shingles Vaccine  Completed    Hepatitis C screen  Completed    Hepatitis A vaccine  Aged Out    Hepatitis B vaccine  Aged Out    Hib vaccine  Aged Out    Meningococcal (ACWY) vaccine  Aged Out       Subjective:      Review of Systems   Constitutional: Unexpected weight change: down 11 lbs in the past year - not drinking as much pop and drinking more water. Objective:     Vitals:    05/11/21 0954 05/11/21 1111   BP: (!) 150/88 132/88   Site: Right Upper Arm Right Upper Arm   Position: Sitting Sitting   Cuff Size: Large Adult Large Adult   Pulse: 80    SpO2: 98%    Weight: 292 lb (132.5 kg)    Height: 5' 3\" (1.6 m)      Physical Exam  Vitals and nursing note reviewed. Constitutional:       General: She is not in acute distress. Appearance: She is well-developed. HENT:      Head: Normocephalic and atraumatic. Right Ear: Tympanic membrane, ear canal and external ear normal.      Left Ear: Tympanic membrane, ear canal and external ear normal.      Nose: Nose normal.      Mouth/Throat:      Mouth: Mucous membranes are moist.      Pharynx: Oropharynx is clear. No posterior oropharyngeal erythema. Eyes:      Conjunctiva/sclera: Conjunctivae normal.   Cardiovascular:      Rate and Rhythm: Normal rate and regular rhythm.       Heart sounds: Normal heart sounds. Pulmonary:      Effort: Pulmonary effort is normal. No respiratory distress. Breath sounds: Normal breath sounds. Abdominal:      General: Bowel sounds are normal. There is no distension. Palpations: Abdomen is soft. Tenderness: There is no abdominal tenderness. Musculoskeletal:      Cervical back: Neck supple. Skin:     General: Skin is warm and dry. Neurological:      Mental Status: She is alert and oriented to person, place, and time. Assessment:       Diagnosis Orders   1. Anxiety     2. Hypothyroidism, unspecified type     3. Depression, unspecified depression type     4. Encounter for screening mammogram for breast cancer  [facility] OSVALDO DIGITAL SCREEN BILATERAL   5. Screen for colon cancer  Cologuard (For External Results Only)         Plan: Will decrease her Synthroid to 12.5 mcg daily (1/2 of a 25 mcg tab) daily and see if she can tolerate that dose better. Will then re-check levels again in 6 weeks. Will start OTC Vit D3 1000 IU daily and re-check level again in 6 weeks as well. Return in about 3 months (around 8/11/2021) for MAW visit and f/u mood, thyroid. Orders Placed This Encounter   Procedures    Cologuard (For External Results Only)     This test is performed by an external laboratory and is used for result attachment only. It is required that this order requisition be faxed to: [facility] @ [phone]. See [email or web address] for further information. Standing Status:   Future     Standing Expiration Date:   5/11/2022    [facility] OSVALDO DIGITAL SCREEN BILATERAL     Standing Status:   Future     Standing Expiration Date:   7/11/2022     Order Specific Question:   Reason for exam:     Answer:   screening for breast cancer     No orders of the defined types were placed in this encounter. Patient given educational materials - see patient instructions. Discussed use, benefit, and side effects of prescribed medications.   All patient questions answered. Pt voiced understanding. Reviewed health maintenance.             Electronically signed by Edwin Richards DO, DO on 5/23/2021 at 11:18 PM

## 2021-05-27 DIAGNOSIS — Z12.11 SCREEN FOR COLON CANCER: ICD-10-CM

## 2021-06-29 ENCOUNTER — HOSPITAL ENCOUNTER (OUTPATIENT)
Dept: LAB | Age: 66
Discharge: HOME | End: 2021-06-29
Payer: MEDICARE

## 2021-06-29 DIAGNOSIS — E03.9 HYPOTHYROIDISM, UNSPECIFIED TYPE: ICD-10-CM

## 2021-06-29 DIAGNOSIS — E55.9 VITAMIN D DEFICIENCY: ICD-10-CM

## 2021-06-29 LAB
THYROXINE, FREE: 1.09 NG/DL (ref 0.93–1.7)
TSH SERPL DL<=0.05 MIU/L-ACNC: 4.75 MIU/L (ref 0.3–5)
VITAMIN D 25-HYDROXY: 29.4 NG/ML (ref 30–100)

## 2021-06-29 PROCEDURE — 36415 COLL VENOUS BLD VENIPUNCTURE: CPT

## 2021-06-29 PROCEDURE — 82306 VITAMIN D 25 HYDROXY: CPT

## 2021-06-29 PROCEDURE — 84443 ASSAY THYROID STIM HORMONE: CPT

## 2021-06-29 PROCEDURE — 84439 ASSAY OF FREE THYROXINE: CPT

## 2021-07-24 ENCOUNTER — OFFICE VISIT (OUTPATIENT)
Dept: PRIMARY CARE CLINIC | Age: 66
End: 2021-07-24
Payer: MEDICARE

## 2021-07-24 VITALS
HEIGHT: 62 IN | RESPIRATION RATE: 20 BRPM | TEMPERATURE: 99.2 F | BODY MASS INDEX: 53.92 KG/M2 | DIASTOLIC BLOOD PRESSURE: 80 MMHG | HEART RATE: 116 BPM | WEIGHT: 293 LBS | SYSTOLIC BLOOD PRESSURE: 146 MMHG | OXYGEN SATURATION: 96 %

## 2021-07-24 DIAGNOSIS — M54.50 ACUTE BILATERAL LOW BACK PAIN WITHOUT SCIATICA: Primary | ICD-10-CM

## 2021-07-24 PROCEDURE — 1123F ACP DISCUSS/DSCN MKR DOCD: CPT | Performed by: NURSE PRACTITIONER

## 2021-07-24 PROCEDURE — 4004F PT TOBACCO SCREEN RCVD TLK: CPT | Performed by: NURSE PRACTITIONER

## 2021-07-24 PROCEDURE — G8400 PT W/DXA NO RESULTS DOC: HCPCS | Performed by: NURSE PRACTITIONER

## 2021-07-24 PROCEDURE — G8427 DOCREV CUR MEDS BY ELIG CLIN: HCPCS | Performed by: NURSE PRACTITIONER

## 2021-07-24 PROCEDURE — 3017F COLORECTAL CA SCREEN DOC REV: CPT | Performed by: NURSE PRACTITIONER

## 2021-07-24 PROCEDURE — 99213 OFFICE O/P EST LOW 20 MIN: CPT | Performed by: NURSE PRACTITIONER

## 2021-07-24 PROCEDURE — 1090F PRES/ABSN URINE INCON ASSESS: CPT | Performed by: NURSE PRACTITIONER

## 2021-07-24 PROCEDURE — 4040F PNEUMOC VAC/ADMIN/RCVD: CPT | Performed by: NURSE PRACTITIONER

## 2021-07-24 PROCEDURE — G8417 CALC BMI ABV UP PARAM F/U: HCPCS | Performed by: NURSE PRACTITIONER

## 2021-07-24 PROCEDURE — 99212 OFFICE O/P EST SF 10 MIN: CPT | Performed by: NURSE PRACTITIONER

## 2021-07-24 RX ORDER — PREDNISONE 20 MG/1
40 TABLET ORAL DAILY
Qty: 10 TABLET | Refills: 0 | Status: SHIPPED | OUTPATIENT
Start: 2021-07-24 | End: 2021-07-29

## 2021-07-24 RX ORDER — KETOROLAC TROMETHAMINE 30 MG/ML
30 INJECTION, SOLUTION INTRAMUSCULAR; INTRAVENOUS ONCE
Status: COMPLETED | OUTPATIENT
Start: 2021-07-24 | End: 2021-07-24

## 2021-07-24 RX ADMIN — KETOROLAC TROMETHAMINE 30 MG: 30 INJECTION, SOLUTION INTRAMUSCULAR; INTRAVENOUS at 11:22

## 2021-07-24 ASSESSMENT — ENCOUNTER SYMPTOMS
ABDOMINAL PAIN: 0
BACK PAIN: 1
RESPIRATORY NEGATIVE: 1
BOWEL INCONTINENCE: 0

## 2021-07-24 NOTE — PROGRESS NOTES
[facility]             [address], [address]                        Telephone [phone]             Fax [phone] Emma Walsh  1955  CVV:[id number]   Date of visit:  7/24/2021    Subjective:    Isaak Schrader is a 78 y.o.  female who presents to [facility] today (7/24/2021) for evaluation of:    Chief Complaint   Patient presents with    Back Pain     both hips and across back started 4 days ago and has gotten worse       Back Pain  This is a chronic (flare up on 07/19/21; denies trauma) problem. The current episode started in the past 7 days (07/19/21). The problem occurs constantly. The problem has been gradually worsening since onset. The pain is present in the lumbar spine. Quality: \"solid pain and pressure\" The pain does not radiate. The pain is at a severity of 10/10. The symptoms are aggravated by bending, sitting, standing and lying down. Pertinent negatives include no abdominal pain, bladder incontinence, bowel incontinence, dysuria, fever, numbness, tingling or weakness. She has tried chiropractic manipulation and heat (tylenol) for the symptoms. The treatment provided no relief.        She has the following problem list:  Patient Active Problem List   Diagnosis    Hypertension    Hyperlipidemia    GERD (gastroesophageal reflux disease)    Morbid obesity ([address]    Depression    Chronic back pain        Current medications are:  Current Outpatient Medications   Medication Sig Dispense Refill    predniSONE (DELTASONE) 20 MG tablet Take 2 tablets by mouth daily for 5 days 10 tablet 0    atorvastatin (LIPITOR) 20 MG tablet TAKE 1 TABLET DAILY 90 tablet 3    buPROPion (WELLBUTRIN XL) 300 MG extended release tablet TAKE 1 TABLET EVERY MORNING 90 tablet 3    losartan (COZAAR) 25 MG tablet TAKE 1 TABLET DAILY 90 tablet 3    atenolol (TENORMIN) 50 MG tablet TAKE 1 TABLET DAILY 90 tablet 3    Scooter MISC Use daily as needed. 1 each 0    Handicap Placard MISC by Does not apply route Issue parking placard for person with disability; Applicant meets the qualifying disability criteria. Length of time expected to have disability__X___Lifetime. Prescription expires in 5 years from issuing date. 1 each 0    Handicap Placard MISC by Does not apply route Issue parking placard for person with disability; Applicant meets the qualifying disability criteria. Length of time expected to have disability__X___Lifetime  Other ____. Prescription expires in 5 years from issuing date. 1 each 0    furosemide (LASIX) 20 MG tablet TAKE 1 TABLET DAILY 90 tablet 3     Current Facility-Administered Medications   Medication Dose Route Frequency Provider Last Rate Last Admin    ketorolac (TORADOL) injection 30 mg  30 mg Intramuscular Once Patsy Mcmahon APRN - CNP            She is allergic to benazepril, dynabac [dirithromycin], levothyroxine, lisinopril, and other. .    She  reports that she has been smoking cigarettes. She started smoking about 6 years ago. She has a 10.00 pack-year smoking history. She has never used smokeless tobacco.      Objective:    Vitals:    07/24/21 1030   BP: (!) 146/80   Site: Left Upper Arm   Position: Sitting   Cuff Size: Large Adult   Pulse: 116   Resp: 20   Temp: 99.2 °F (37.3 °C)   TempSrc: Tympanic   SpO2: 96%   Weight: 297 lb (134.7 kg)   Height: 5' 2\" (1.575 m)     Body mass index is 54.32 kg/m². Review of Systems   Constitutional: Negative. Negative for fever. Respiratory: Negative. Cardiovascular: Negative. Gastrointestinal: Negative for abdominal pain and bowel incontinence. Genitourinary: Negative for bladder incontinence and dysuria. Musculoskeletal: Positive for back pain. Neurological: Negative for tingling, weakness and numbness. Physical Exam  Vitals and nursing note reviewed.    Constitutional:       Appearance: She is well-developed. HENT:      Head: Normocephalic. Eyes:      Pupils: Pupils are equal, round, and reactive to light. Cardiovascular:      Rate and Rhythm: Normal rate and regular rhythm. Heart sounds: Normal heart sounds. Pulmonary:      Effort: Pulmonary effort is normal.      Breath sounds: Normal breath sounds and air entry. Musculoskeletal:      Cervical back: Normal range of motion and neck supple. Lumbar back: Tenderness present. No swelling or bony tenderness. Decreased range of motion (increased pain with flexion, and bilateral lateral bend with L>R). Negative right straight leg raise test and negative left straight leg raise test.        Back:    Skin:     General: Skin is warm and dry. Neurological:      Mental Status: She is alert and oriented to person, place, and time. Psychiatric:         Behavior: Behavior normal.         Thought Content: Thought content normal.       Assessment and Plan:    No results found for this visit on 07/24/21. Diagnosis Orders   1. Acute bilateral low back pain without sciatica  predniSONE (DELTASONE) 20 MG tablet    ketorolac (TORADOL) injection 30 mg       Take prednisone as directed. We will give her Toradol injection today during visit. Take Tylenol as needed for pain. Rest the affected painful area. Apply Bio Freeze or [id number] to area as needed. Apply cold compresses intermittently as needed. As pain recedes, begin normal activities slowly as tolerated. Do low back exercises and stretches daily as provided. Follow up with PCP if symptoms do not improve or worsen. The use, risks, benefits, and side effects of prescribed or recommended medications were discussed. All questions were answered and the patient/caregiver voiced understanding. No orders of the defined types were placed in this encounter.         Electronically signed by VLADIMIR Cardona CNP on 7/24/21 at 10:59 AM EDT

## 2021-07-24 NOTE — PATIENT INSTRUCTIONS
Patient Education        Learning About Low Back Pain  What is low back pain? Low back pain is pain that can occur anywhere below the ribs and above the legs. It is very common. Almost everyone has it at one time or another. Low back pain can be:  · Acute. This is new pain that can last a few days to a few weeksat the most a few months. · Chronic. This pain can last for more than a few months. Sometimes it can last for years. What are some myths about low back pain? Here are some common myths about low back painand the facts:  Myth: \"I need to rest my back when I have back pain. \"   Fact: Staying active won't hurt you. It may help you get better faster. Myth: \"I need prescription pain medicine. \"   Fact: It's best to try to let time and being active heal your back. Opioid pain medicinessuch as hydrocodone or oxycodoneusually don't work any better than over-the-counter medicines like ibuprofen or naproxen. And opioids can cause serious problems like opioid use disorder or overdose. Moderate to severe opioid use disorder is sometimes called addiction. Myth: \"I need a test like an X-ray or an MRI to diagnose my low back pain. \"   Fact: Getting a test right away won't help you get better faster. And it could lead you down a treatment path you may not need, since most people get better on their own. What causes low back pain? In most cases, there isn't a clear cause. This can be frustrating, because your back hurts and there's no obvious reason. Your back pain can be caused by:  Overuse or muscle strain. This can happen from playing sports, lifting heavy things, or not being physically fit. A herniated disc. This is a problem with the cushion between the bones in your back. Arthritis. With age, you may have changes in your bones that can narrow the space around your nerves. Other causes. In rare cases, the cause is a serious illness like an infection or cancer.  But there are usually other symptoms too.  What are the symptoms? Back pain can come on quickly or over time. You may feel:  · Pain in your hips or buttock. · Leg pain, numbness, tingling, or weakness. When a nerve gets squeezedsuch as from a disc problem or arthritisyou may have symptoms in your leg or foot. You can even have leg symptoms from a back problem without having any pain in your back. · Pain that's sharp or dull, sometimes with stiffness or muscle spasms. It may be in one small area or over a broad area. But even bad pain doesn't mean that it's caused by something serious. How is low back pain diagnosed? A physical exam is the main way to diagnose low back pain. Your doctor may examine your back, check your nerves by testing your reflexes, and make sure that your muscles are strong. Your doctor also will ask questions about your back and overall health. Most people don't need any tests right away. Tests often don't show the reason for your pain. If your pain lasts more than 6 weeks or you have symptoms that your doctor is more concerned about, then your doctor may order tests. These may include an X-ray, a CT scan, or an MRI. Sometimes other tests such as a bone scan or nerve conduction test may be done. How is low back pain treated? Most acute low back pain gets better on its own within a few weeks, no matter what the cause. Time and doing usual activities are all that most people need to feel better. Using heat or ice and taking over-the-counter pain medicine also can help while your body heals. If you aren't getting better on your own or your pain is very bad, your doctor may recommend:  · Physical therapy. · Spinal manipulation, such as by a chiropractor. · Acupuncture. · Massage. · Injections of steroid medicine in your back (especially for pain that involves your legs). If you have chronic low back pain, treatment will help you understand and manage your pain. Treatment may include:  · Staying active.  This may include walking or doing back exercises. · Physical therapy. · Medicines. Some of these medicines are also used for other problems, like depression. · Pain management. Your doctor may have you see a pain specialist.  · Counseling. Having chronic pain can be hard. It may help to talk to someone who can help you cope with your pain. Surgery isn't needed for most people. But it may help some types of low back pain. Follow-up care is a key part of your treatment and safety. Be sure to make and go to all appointments, and call your doctor if you are having problems. It's also a good idea to know your test results and keep a list of the medicines you take. When should you call for help? Call 911 anytime you think you may need emergency care. For example, call if:  · You can't move a leg at all. Call your doctor now or seek immediate medical care if:  · You have new or worse symptoms in your legs, belly, or buttocks. Symptoms may include:  ? Numbness or tingling. ? Weakness. ? Pain. · You lose bladder or bowel control. Watch closely for changes in your health, and be sure to contact your doctor if:  · Along with the back pain, you have a fever, lose weight, or don't feel well. · You do not get better as expected. Where can you learn more? Go to https://[employer]. org and sign in to your [employer] account. Enter A007 in the [facility] box to learn more about \"Learning About Low Back Pain. \"     If you do not have an account, please click on the \"Sign Up Now\" link. Current as of: November 16, 2020               Content Version: 12.9  © 6723-6877 Healthwise, [employer]. Care instructions adapted under license by [facility] ([facility]). If you have questions about a medical condition or this instruction, always ask your healthcare professional. [address]  any warranty or liability for your use of this information.          Patient Education        Low Back Pain: Exercises  Introduction  Here are some examples of exercises for you to try. The exercises may be suggested for a condition or for rehabilitation. Start each exercise slowly. Ease off the exercises if you start to have pain. You will be told when to start these exercises and which ones will work best for you. How to do the exercises  Press-up   1. Lie on your stomach, supporting your body with your forearms. 2. Press your elbows down into the floor to raise your upper back. As you do this, relax your stomach muscles and allow your back to arch without using your back muscles. As your press up, do not let your hips or pelvis come off the floor. 3. Hold for 15 to 30 seconds, then relax. 4. Repeat 2 to 4 times. Alternate arm and leg (bird dog) exercise   Do this exercise slowly. Try to keep your body straight at all times, and do not let one hip drop lower than the other. 1. Start on the floor, on your hands and knees. 2. Tighten your belly muscles. 3. Raise one leg off the floor, and hold it straight out behind you. Be careful not to let your hip drop down, because that will twist your trunk. 4. Hold for about 6 seconds, then lower your leg and switch to the other leg. 5. Repeat 8 to 12 times on each leg. 6. Over time, work up to holding for 10 to 30 seconds each time. 7. If you feel stable and secure with your leg raised, try raising the opposite arm straight out in front of you at the same time. Knee-to-chest exercise   1. Lie on your back with your knees bent and your feet flat on the floor. 2. Bring one knee to your chest, keeping the other foot flat on the floor (or keeping the other leg straight, whichever feels better on your lower back). 3. Keep your lower back pressed to the floor. Hold for at least 15 to 30 seconds. 4. Relax, and lower the knee to the starting position. 5. Repeat with the other leg. Repeat 2 to 4 times with each leg.   6. To get more stretch, put your other leg flat on the floor while pulling your knee to your chest.    Curl-ups   1. Lie on the floor on your back with your knees bent at a 90-degree angle. Your feet should be flat on the floor, about 12 inches from your buttocks. 2. Cross your arms over your chest. If this bothers your neck, try putting your hands behind your neck (not your head), with your elbows spread apart. 3. Slowly tighten your belly muscles and raise your shoulder blades off the floor. 4. Keep your head in line with your body, and do not press your chin to your chest.  5. Hold this position for 1 or 2 seconds, then slowly lower yourself back down to the floor. 6. Repeat 8 to 12 times. Pelvic tilt exercise   1. Lie on your back with your knees bent. 2. \"Brace\" your stomach. This means to tighten your muscles by pulling in and imagining your belly button moving toward your spine. You should feel like your back is pressing to the floor and your hips and pelvis are rocking back. 3. Hold for about 6 seconds while you breathe smoothly. 4. Repeat 8 to 12 times. Heel dig bridging   1. Lie on your back with both knees bent and your ankles bent so that only your heels are digging into the floor. Your knees should be bent about 90 degrees. 2. Then push your heels into the floor, squeeze your buttocks, and lift your hips off the floor until your shoulders, hips, and knees are all in a straight line. 3. Hold for about 6 seconds as you continue to breathe normally, and then slowly lower your hips back down to the floor and rest for up to 10 seconds. 4. Do 8 to 12 repetitions. Hamstring stretch in doorway   1. Lie on your back in a doorway, with one leg through the open door. 2. Slide your leg up the wall to straighten your knee. You should feel a gentle stretch down the back of your leg. 3. Hold the stretch for at least 15 to 30 seconds. Do not arch your back, point your toes, or bend either knee.  Keep one heel touching the floor and the other heel

## 2021-07-27 ENCOUNTER — TELEPHONE (OUTPATIENT)
Dept: FAMILY MEDICINE CLINIC | Age: 66
End: 2021-07-27

## 2021-07-27 NOTE — TELEPHONE ENCOUNTER
Pt received toradol injection during visit and was started on prednisone. Pt can take tylenol as well for pain and can apply biofreeze or icy-hot. Gentle stretches. Ice to area 3-4 times daily for 20 minutes at a time. If pt symptoms are worsening, pt should be seen by a provider.

## 2021-07-27 NOTE — TELEPHONE ENCOUNTER
Pt calling stating she was seen in [facility] on Saturday and given an injection and Prednisone, but pt is still having a lot of pain and questions if there is anything OTC they can take for the pain, did not want to give pharmacy as they are now in [place] and don't know of any pharmacy in the area, please advise at above number.

## 2021-07-27 NOTE — TELEPHONE ENCOUNTER
Pt advised and states she is out of state and will see how she is once she is back in [facility].

## 2021-08-03 ENCOUNTER — TELEPHONE (OUTPATIENT)
Dept: FAMILY MEDICINE CLINIC | Age: 66
End: 2021-08-03

## 2021-08-03 DIAGNOSIS — B96.20 E COLI BACTEREMIA: ICD-10-CM

## 2021-08-03 DIAGNOSIS — R78.81 E COLI BACTEREMIA: ICD-10-CM

## 2021-08-03 DIAGNOSIS — M46.26 OSTEOMYELITIS OF LUMBAR SPINE (HCC): Primary | ICD-10-CM

## 2021-08-03 DIAGNOSIS — Z79.2 RECEIVING INTRAVENOUS ANTIBIOTIC TREATMENT AS OUTPATIENT: ICD-10-CM

## 2021-08-03 NOTE — TELEPHONE ENCOUNTER
Call from physician in [facility], speaking with Dr. Tomy Tomlinson at this time regarding pt status and orders.

## 2021-08-03 NOTE — TELEPHONE ENCOUNTER
Pt calling stating she is in the hospital at [facility], states she was first seen at [facility] and then transferred, pt states she needs to get set up with infectious disease and also with someone to give her an injection for the next 2-4 weeks, please advise at [phone]

## 2021-08-05 NOTE — TELEPHONE ENCOUNTER
Patient and family calling for update of this matter and what is the next step. They are traveling back home from [place] and will be home by this weekend. Patient would like to know when she needs to follow up along with IV placement for IV antibiotics. Please advise.

## 2021-08-06 DIAGNOSIS — M46.26 OSTEOMYELITIS OF LUMBAR SPINE (HCC): Primary | ICD-10-CM

## 2021-08-06 DIAGNOSIS — R78.81 E COLI BACTEREMIA: ICD-10-CM

## 2021-08-06 DIAGNOSIS — B96.20 E COLI BACTEREMIA: ICD-10-CM

## 2021-08-06 RX ORDER — CEFTRIAXONE SODIUM 10 G/100ML
2 INJECTION, POWDER, FOR SOLUTION INTRAVENOUS ONCE
Status: DISCONTINUED | OUTPATIENT
Start: 2021-08-09 | End: 2021-08-19 | Stop reason: HOSPADM

## 2021-08-06 NOTE — TELEPHONE ENCOUNTER
Call to pt to verify she is still taking ATB. States yes and will continue until PICC is placed and new one is prescribed and started.

## 2021-08-06 NOTE — TELEPHONE ENCOUNTER
Rocephin 2g daily until 9/25, order pended for you. Spoke with Colby at [facility] DR SHAFFER, pt to report to main [facility] at 10 am on 8/9 for PICC insertion and then will be taken up to the surgery center. Staff will contact pt with instruction.

## 2021-08-09 ENCOUNTER — HOSPITAL ENCOUNTER (OUTPATIENT)
Dept: INFUSION THERAPY | Age: 66
Setting detail: INFUSION SERIES
Discharge: HOME | End: 2021-08-09
Payer: MEDICARE

## 2021-08-09 ENCOUNTER — HOSPITAL ENCOUNTER (OUTPATIENT)
Dept: GENERAL RADIOLOGY | Age: 66
Discharge: HOME | End: 2021-08-11
Payer: MEDICARE

## 2021-08-09 ENCOUNTER — CLINICAL DOCUMENTATION (OUTPATIENT)
Dept: SPIRITUAL SERVICES | Age: 66
End: 2021-08-09

## 2021-08-09 DIAGNOSIS — B96.20 E COLI BACTEREMIA: ICD-10-CM

## 2021-08-09 DIAGNOSIS — R78.81 E COLI BACTEREMIA: ICD-10-CM

## 2021-08-09 DIAGNOSIS — M46.26 OSTEOMYELITIS OF LUMBAR SPINE (HCC): Primary | ICD-10-CM

## 2021-08-09 DIAGNOSIS — G89.29 CHRONIC LOW BACK PAIN, UNSPECIFIED BACK PAIN LATERALITY, UNSPECIFIED WHETHER SCIATICA PRESENT: ICD-10-CM

## 2021-08-09 DIAGNOSIS — M54.50 CHRONIC LOW BACK PAIN, UNSPECIFIED BACK PAIN LATERALITY, UNSPECIFIED WHETHER SCIATICA PRESENT: ICD-10-CM

## 2021-08-09 PROCEDURE — 71045 X-RAY EXAM CHEST 1 VIEW: CPT

## 2021-08-09 PROCEDURE — 36569 INSJ PICC 5 YR+ W/O IMAGING: CPT

## 2021-08-09 PROCEDURE — 6360000002 HC RX W HCPCS: Performed by: FAMILY MEDICINE

## 2021-08-09 PROCEDURE — 2580000003 HC RX 258: Performed by: FAMILY MEDICINE

## 2021-08-09 RX ORDER — OXYCODONE HYDROCHLORIDE AND ACETAMINOPHEN 5; 325 MG/1; MG/1
1 TABLET ORAL EVERY 6 HOURS PRN
COMMUNITY
Start: 2021-08-03 | End: 2021-08-10 | Stop reason: SDUPTHER

## 2021-08-09 RX ORDER — OXYCODONE HYDROCHLORIDE AND ACETAMINOPHEN 5; 325 MG/1; MG/1
1 TABLET ORAL EVERY 6 HOURS PRN
Status: CANCELLED | OUTPATIENT
Start: 2021-08-09

## 2021-08-09 RX ORDER — CYCLOBENZAPRINE HCL 10 MG
1 TABLET ORAL 3 TIMES DAILY PRN
COMMUNITY
Start: 2021-08-03 | End: 2021-08-10 | Stop reason: SDUPTHER

## 2021-08-09 RX ORDER — CEFTRIAXONE 500 MG/1
2 INJECTION, POWDER, FOR SOLUTION INTRAMUSCULAR; INTRAVENOUS EVERY 24 HOURS
Qty: 94000 MG | Refills: 0 | Status: SHIPPED | OUTPATIENT
Start: 2021-08-09 | End: 2021-09-25

## 2021-08-09 RX ORDER — CIPROFLOXACIN 750 MG/1
1 TABLET, FILM COATED ORAL EVERY 12 HOURS
Status: ON HOLD | COMMUNITY
Start: 2021-08-03 | End: 2021-08-19 | Stop reason: HOSPADM

## 2021-08-09 RX ORDER — CYCLOBENZAPRINE HCL 10 MG
10 TABLET ORAL 3 TIMES DAILY PRN
Status: CANCELLED | OUTPATIENT
Start: 2021-08-09

## 2021-08-09 RX ADMIN — CEFTRIAXONE SODIUM 2000 MG: 2 INJECTION, POWDER, FOR SOLUTION INTRAMUSCULAR; INTRAVENOUS at 14:56

## 2021-08-09 NOTE — FLOWSHEET NOTE
PICC line flushed with NS 10ml, education provided on warm compresses tid x20 minutes as needed for discomfort. Patient reports that she does not have any pain medication left from her discharge from [facility], writer contacted Dr. Jose Harper RN regarding pain medication (Percocet and Flexeril). Provided education on PICC, PICC care and discharge instruction. Patient discharged to home with  and grandson.

## 2021-08-09 NOTE — PROCEDURES
Ultrasound guided Right dual lumen PICC insertion performed Sterile. PICC trimmed at 40 cm and left 2 cm at skin after Right Cephalic Vein placement. Pt tolerates without incident  Lotariso 5F dual lumen lot [id number]    1230 to 1400.  CXR confirmed placement     VLADIMIR Mendez - CRNA,MSN,8/9/2021 5:15 PM  Chief Anesthetist  [facility]

## 2021-08-09 NOTE — TELEPHONE ENCOUNTER
[facility] called requesting a refill of the below medication which has been pended for you:     Requested Prescriptions     Pending Prescriptions Disp Refills    cyclobenzaprine (FLEXERIL) 10 MG tablet       Sig: Take 1 tablet by mouth 3 times daily as needed    oxyCODONE-acetaminophen (PERCOCET) 5-325 MG per tablet       Sig: Take 1 tablet by mouth every 6 hours as needed. Last Appointment Date: 5/11/2021  Next Appointment Date: 8/11/2021    Allergies   Allergen Reactions    Benazepril Other (See Comments)     cough    Dynabac [Dirithromycin]     Levothyroxine Other (See Comments)     Caused leg swelling, headache, insomnia, nausea.     Lisinopril Other (See Comments)     Cough      Other      Generic Prozac

## 2021-08-09 NOTE — FLOWSHEET NOTE
08/09/21 1200   Encounter Summary   Services provided to: Patient and family together   Referral/Consult From: [facility] Family members   Continue Visiting   (8/9/21)   Complexity of Encounter Moderate   Length of Encounter 15 minutes   Spiritual Assessment Completed Yes   Spiritual/Hinduism   Type Spiritual support   Assessment Approachable   Intervention Active listening;Prayer   Outcome Expressed gratitude;Engaged in conversation

## 2021-08-09 NOTE — PROGRESS NOTES
rounding on PCU    Assessment: Patient is being treated as outpatient for infusion. She presents as having distress. Two visitors are sitting with her for support. After  identified himself patient requested prayer. Intervention: Engaged in conversation.  prayed with her at bedside. Patient expressed appreciation for visit and offer of continued prayer. Plan: Chaplains are available on site or on call 24/7 for spiritual and emotional support.

## 2021-08-10 RX ORDER — CYCLOBENZAPRINE HCL 10 MG
10 TABLET ORAL 3 TIMES DAILY PRN
Qty: 40 TABLET | Refills: 1 | Status: SHIPPED | OUTPATIENT
Start: 2021-08-10 | End: 2021-10-20 | Stop reason: ALTCHOICE

## 2021-08-10 RX ORDER — OXYCODONE HYDROCHLORIDE AND ACETAMINOPHEN 5; 325 MG/1; MG/1
1 TABLET ORAL EVERY 6 HOURS PRN
Qty: 20 TABLET | Refills: 0 | Status: SHIPPED | OUTPATIENT
Start: 2021-08-10 | End: 2021-08-15

## 2021-08-10 NOTE — TELEPHONE ENCOUNTER
Pt requesting this to treat her pain from osteomyelitis infection - was being given these meds as needed when admitted to hospital in [place]. Will refill (short-term for Percocet), and pt will f/u in the office tomorrow for hospital follow-up visit. Controlled Substance Monitoring:    Acute and Chronic Pain Monitoring:   RX Monitoring 8/10/2021   Attestation -   Periodic Controlled Substance Monitoring No signs of potential drug abuse or diversion identified.

## 2021-08-11 ENCOUNTER — OFFICE VISIT (OUTPATIENT)
Dept: FAMILY MEDICINE CLINIC | Age: 66
End: 2021-08-11
Payer: MEDICARE

## 2021-08-11 VITALS
SYSTOLIC BLOOD PRESSURE: 108 MMHG | HEART RATE: 97 BPM | HEIGHT: 62 IN | BODY MASS INDEX: 54.32 KG/M2 | RESPIRATION RATE: 20 BRPM | TEMPERATURE: 96.8 F | DIASTOLIC BLOOD PRESSURE: 70 MMHG | OXYGEN SATURATION: 96 %

## 2021-08-11 DIAGNOSIS — M46.26 OSTEOMYELITIS OF LUMBAR SPINE (HCC): Primary | ICD-10-CM

## 2021-08-11 DIAGNOSIS — R78.81 E COLI BACTEREMIA: ICD-10-CM

## 2021-08-11 DIAGNOSIS — R11.0 NAUSEA: ICD-10-CM

## 2021-08-11 DIAGNOSIS — Z95.828 STATUS POST PICC CENTRAL LINE PLACEMENT: ICD-10-CM

## 2021-08-11 DIAGNOSIS — B96.20 E COLI BACTEREMIA: ICD-10-CM

## 2021-08-11 PROCEDURE — G8400 PT W/DXA NO RESULTS DOC: HCPCS | Performed by: FAMILY MEDICINE

## 2021-08-11 PROCEDURE — 1123F ACP DISCUSS/DSCN MKR DOCD: CPT | Performed by: FAMILY MEDICINE

## 2021-08-11 PROCEDURE — 4004F PT TOBACCO SCREEN RCVD TLK: CPT | Performed by: FAMILY MEDICINE

## 2021-08-11 PROCEDURE — G8417 CALC BMI ABV UP PARAM F/U: HCPCS | Performed by: FAMILY MEDICINE

## 2021-08-11 PROCEDURE — 3017F COLORECTAL CA SCREEN DOC REV: CPT | Performed by: FAMILY MEDICINE

## 2021-08-11 PROCEDURE — 99214 OFFICE O/P EST MOD 30 MIN: CPT | Performed by: FAMILY MEDICINE

## 2021-08-11 PROCEDURE — G8427 DOCREV CUR MEDS BY ELIG CLIN: HCPCS | Performed by: FAMILY MEDICINE

## 2021-08-11 PROCEDURE — 4040F PNEUMOC VAC/ADMIN/RCVD: CPT | Performed by: FAMILY MEDICINE

## 2021-08-11 PROCEDURE — 1111F DSCHRG MED/CURRENT MED MERGE: CPT | Performed by: FAMILY MEDICINE

## 2021-08-11 PROCEDURE — 1090F PRES/ABSN URINE INCON ASSESS: CPT | Performed by: FAMILY MEDICINE

## 2021-08-11 RX ORDER — DOCUSATE SODIUM 50 MG AND SENNOSIDES 8.6 MG 8.6; 5 MG/1; MG/1
2 TABLET, FILM COATED ORAL NIGHTLY
COMMUNITY
Start: 2021-08-03 | End: 2021-10-20 | Stop reason: ALTCHOICE

## 2021-08-11 ASSESSMENT — ENCOUNTER SYMPTOMS
DIARRHEA: 1
BACK PAIN: 1
NAUSEA: 1
VOMITING: 0

## 2021-08-11 NOTE — PROGRESS NOTES
Post-Discharge Transitional Care Management Services or Hospital Follow Up      Kiley Archuleta   YOB: 1955    Date of Office Visit:  2021  Date of Hospital Admission: 21  Date of Hospital Discharge: 8/3/21    Care management risk score Rising risk (score 2-5) and Complex Care (Scores >=6): 2     Non face to face  following discharge, date last encounter closed (first attempt may have been earlier): 2021 11:35 AM     Call initiated 2 business days of discharge: Yes    Patient Active Problem List   Diagnosis    Hypertension    Hyperlipidemia    GERD (gastroesophageal reflux disease)    Morbid obesity ([address]    Depression    Chronic back pain    [facility]    Hyponatremia    Lactic acid increased    Anxiety    Osteomyelitis of lumbar spine (HCC)    Chronic diastolic heart failure (HCC)       Allergies   Allergen Reactions    Benazepril Other (See Comments)     cough    Dynabac [Dirithromycin]     Levothyroxine Other (See Comments)     Caused leg swelling, headache, insomnia, nausea.  Lisinopril Other (See Comments)     Cough      Other      Generic Prozac       Medications listed as ordered at the time of discharge from hospital      Medications marked \"taking\" at this time  Outpatient Medications Marked as Taking for the 21 encounter (Office Visit) with Anabelle García, DO   Medication Sig Dispense Refill    PSYLLIUM PO Take by mouth      cyclobenzaprine (FLEXERIL) 10 MG tablet Take 1 tablet by mouth 3 times daily as needed for Muscle spasms 40 tablet 1    [] oxyCODONE-acetaminophen (PERCOCET) 5-325 MG per tablet Take 1 tablet by mouth every 6 hours as needed for Pain for up to 5 days.  20 tablet 0    cefTRIAXone (ROCEPHIN) 500 MG injection Infuse 2,000 mg intravenously every 24 hours 60423 mg 0    atorvastatin (LIPITOR) 20 MG tablet TAKE 1 TABLET DAILY 90 tablet 3    buPROPion (WELLBUTRIN XL) 300 MG extended release tablet TAKE 1 TABLET EVERY MORNING 90 tablet 3    losartan (COZAAR) 25 MG tablet TAKE 1 TABLET DAILY 90 tablet 3    atenolol (TENORMIN) 50 MG tablet TAKE 1 TABLET DAILY 90 tablet 3    Scooter MISC Use daily as needed. 1 each 0    Handicap Placard MISC by Does not apply route Issue parking placard for person with disability; Applicant meets the qualifying disability criteria. Length of time expected to have disability__X___Lifetime. Prescription expires in 5 years from issuing date. 1 each 0    Handicap Placard MISC by Does not apply route Issue parking placard for person with disability; Applicant meets the qualifying disability criteria. Length of time expected to have disability__X___Lifetime  Other ____. Prescription expires in 5 years from issuing date. 1 each 0    furosemide (LASIX) 20 MG tablet TAKE 1 TABLET DAILY 90 tablet 3        Medications patient taking as of now reconciled against medications ordered at time of hospital discharge: Yes    Chief Complaint   Patient presents with    Follow-Up from Hospital     osteomyelitis, e. coli bacteremia       HPI    Was taking OTC Tylenol frequently    Had some chills, but after she was already diagnosed    Pain is aching, pulling, mostly with changing position    Drinking water, orange Gatorade, 7-up/Vernors, chicken broth  Eating Jell-o, pudding, 1/4 sandwich    Taking Percocet 3-4x's per day        Inpatient course: Discharge summary reviewed- see chart. Interval history/Current status: improving slowly    Vitals:    08/11/21 1131   BP: 108/70   Site: Right Upper Arm   Position: Sitting   Pulse: 97   Resp: 20   Temp: 96.8 °F (36 °C)   TempSrc: Tympanic   SpO2: 96%   Height: 5' 2\" (1.575 m)     Body mass index is 54.32 kg/m².    Wt Readings from Last 3 Encounters:   08/18/21 270 lb (122.5 kg)   08/17/21 270 lb (122.5 kg)   07/24/21 297 lb (134.7 kg)     BP Readings from Last 3 Encounters:   08/19/21 (!) 109/54   08/18/21 112/70   08/11/21 108/70       Review of Systems Constitutional: Positive for appetite change (decreased). Negative for chills and fever. HENT: Positive for hearing loss (R ear plugged). Gastrointestinal: Positive for diarrhea and nausea (intermittent). Negative for vomiting. Musculoskeletal: Positive for back pain (Sharp pain over L lower back that goes down to her L knee). Physical Exam  Constitutional:       General: She is not in acute distress. Appearance: Normal appearance. HENT:      Head: Normocephalic and atraumatic. Eyes:      Conjunctiva/sclera: Conjunctivae normal.   Cardiovascular:      Rate and Rhythm: Normal rate and regular rhythm. Heart sounds: Normal heart sounds. Pulmonary:      Effort: Pulmonary effort is normal. No respiratory distress. Breath sounds: Normal breath sounds. Abdominal:      General: Bowel sounds are normal. There is no distension. Palpations: Abdomen is soft. Tenderness: There is no abdominal tenderness. Musculoskeletal:      Lumbar back: Tenderness present. Right lower leg: No edema. Left lower leg: No edema. Skin:     General: Skin is warm and dry. Comments: PICC line in place over RUE; mild dried blood around insertion site   Neurological:      General: No focal deficit present. Mental Status: She is alert and oriented to person, place, and time. Psychiatric:         Mood and Affect: Mood normal.               Assessment/Plan:  1. Osteomyelitis of lumbar spine (HCC)  - TN DISCHARGE MEDS RECONCILED W/ CURRENT OUTPATIENT MED LIST    2. E coli bacteremia  - TN DISCHARGE MEDS RECONCILED W/ CURRENT OUTPATIENT MED LIST    3. Status post PICC central line placement    4.  Nausea        Medical Decision Making: moderate complexity

## 2021-08-12 ENCOUNTER — TELEPHONE (OUTPATIENT)
Dept: FAMILY MEDICINE CLINIC | Age: 66
End: 2021-08-12

## 2021-08-12 NOTE — TELEPHONE ENCOUNTER
Patient is stating that she is starting to get bed sores and she is wondering what you recommend her to put on them before they get worse.

## 2021-08-13 NOTE — TELEPHONE ENCOUNTER
The best thing to do is have pt put a barrier cream on, like Vaseline, Aquaphor, or a diaper rash ointment, and reposition frequently to take the pressure off the area. If the skin breaks open, I can prescribe a medicated bandage called Mepilex to put over the area. Can pt please have home health check the area next time they are there, and give me an update? She should have family member check the area until then, and call if worsening.

## 2021-08-16 ENCOUNTER — TELEPHONE (OUTPATIENT)
Dept: FAMILY MEDICINE CLINIC | Age: 66
End: 2021-08-16

## 2021-08-16 NOTE — TELEPHONE ENCOUNTER
Call to pt and . States sore area is getting better, has been using Aquaphor, will have home health check area as well. Educated on repositioning, voiced understanding. Pt reports nausea with vomiting, today is day 3. Writer asked if she thinks its in relation to taking pain meds, states has not taken any pills today. Would like something sent to [facility].

## 2021-08-16 NOTE — TELEPHONE ENCOUNTER
Call to pt and , per Dr. Lincoln Aragon advised she would like to see pt before prescribing any medication as this is a new symptom. Encouraged to go to UC either today or tomorrow or if symptoms worsen, to the ER.  voiced understanding.

## 2021-08-16 NOTE — TELEPHONE ENCOUNTER
----- Message from Maryann Eldridge sent at 8/16/2021  2:52 PM EDT -----  Subject: Message to Provider    QUESTIONS  Information for Provider? Darlene Litten called to let Provider Nino   know they will be coming to urgent care tomorrow 8/17/21.  ---------------------------------------------------------------------------  --------------  Gilliam Pilon INFO  What is the best way for the office to contact you? OK to leave message on   voicemail  Preferred Call Back Phone Number? [phone]  ---------------------------------------------------------------------------  --------------  SCRIPT ANSWERS  Relationship to Patient?  Self

## 2021-08-17 ENCOUNTER — APPOINTMENT (OUTPATIENT)
Dept: CT IMAGING | Age: 66
End: 2021-08-17
Payer: MEDICARE

## 2021-08-17 ENCOUNTER — HOSPITAL ENCOUNTER (EMERGENCY)
Age: 66
Discharge: ANOTHER HOSPITAL | End: 2021-08-18
Attending: EMERGENCY MEDICINE
Payer: MEDICARE

## 2021-08-17 DIAGNOSIS — K68.12 PSOAS ABSCESS (HCC): Primary | ICD-10-CM

## 2021-08-17 DIAGNOSIS — M46.26 OSTEOMYELITIS OF LUMBAR SPINE (HCC): ICD-10-CM

## 2021-08-17 LAB
-: ABNORMAL
ABSOLUTE EOS #: 0.05 K/UL (ref 0–0.44)
ABSOLUTE IMMATURE GRANULOCYTE: 0.05 K/UL (ref 0–0.3)
ABSOLUTE LYMPH #: 1 K/UL (ref 1.1–3.7)
ABSOLUTE MONO #: 0.78 K/UL (ref 0.1–1.2)
ALBUMIN SERPL-MCNC: 3.1 G/DL (ref 3.5–5.2)
ALBUMIN/GLOBULIN RATIO: 0.8 (ref 1–2.5)
ALP BLD-CCNC: 112 U/L (ref 35–104)
ALT SERPL-CCNC: 26 U/L (ref 5–33)
AMORPHOUS: ABNORMAL
ANION GAP SERPL CALCULATED.3IONS-SCNC: 15 MMOL/L (ref 9–17)
AST SERPL-CCNC: 32 U/L
BACTERIA: ABNORMAL
BASOPHILS # BLD: 0 % (ref 0–2)
BASOPHILS ABSOLUTE: <0.03 K/UL (ref 0–0.2)
BILIRUB SERPL-MCNC: 0.55 MG/DL (ref 0.3–1.2)
BILIRUBIN DIRECT: 0.12 MG/DL
BILIRUBIN URINE: NEGATIVE
BILIRUBIN, INDIRECT: 0.43 MG/DL (ref 0–1)
BUN BLDV-MCNC: 10 MG/DL (ref 8–23)
BUN/CREAT BLD: 19 (ref 9–20)
CALCIUM SERPL-MCNC: 9.6 MG/DL (ref 8.6–10.4)
CASTS UA: ABNORMAL /LPF (ref 0–2)
CHLORIDE BLD-SCNC: 96 MMOL/L (ref 98–107)
CO2: 18 MMOL/L (ref 20–31)
COLOR: ABNORMAL
COMMENT UA: ABNORMAL
CREAT SERPL-MCNC: 0.53 MG/DL (ref 0.5–0.9)
CRYSTALS, UA: ABNORMAL /HPF
D-DIMER QUANTITATIVE: 1.8 MG/L FEU (ref 0–0.59)
DIFFERENTIAL TYPE: ABNORMAL
EOSINOPHILS RELATIVE PERCENT: 1 % (ref 1–4)
EPITHELIAL CELLS UA: ABNORMAL /HPF (ref 0–5)
GFR AFRICAN AMERICAN: >60 ML/MIN
GFR NON-AFRICAN AMERICAN: >60 ML/MIN
GFR SERPL CREATININE-BSD FRML MDRD: ABNORMAL ML/MIN/{1.73_M2}
GFR SERPL CREATININE-BSD FRML MDRD: ABNORMAL ML/MIN/{1.73_M2}
GLOBULIN: 4.1 G/DL (ref 1.5–3.8)
GLUCOSE BLD-MCNC: 127 MG/DL (ref 70–99)
GLUCOSE URINE: NEGATIVE
HCT VFR BLD CALC: 40 % (ref 36.3–47.1)
HEMOGLOBIN: 13.3 G/DL (ref 11.9–15.1)
IMMATURE GRANULOCYTES: 1 %
KETONES, URINE: ABNORMAL
LACTIC ACID, SEPSIS WHOLE BLOOD: ABNORMAL MMOL/L (ref 0.5–1.9)
LACTIC ACID, SEPSIS WHOLE BLOOD: ABNORMAL MMOL/L (ref 0.5–1.9)
LACTIC ACID, SEPSIS: 2.1 MMOL/L (ref 0.5–1.9)
LACTIC ACID, SEPSIS: 2.1 MMOL/L (ref 0.5–1.9)
LEUKOCYTE ESTERASE, URINE: NEGATIVE
LIPASE: 24 U/L (ref 13–60)
LYMPHOCYTES # BLD: 11 % (ref 24–43)
MCH RBC QN AUTO: 28.4 PG (ref 25.2–33.5)
MCHC RBC AUTO-ENTMCNC: 33.3 G/DL (ref 25.2–33.5)
MCV RBC AUTO: 85.5 FL (ref 82.6–102.9)
MONOCYTES # BLD: 9 % (ref 3–12)
MUCUS: ABNORMAL
NITRITE, URINE: NEGATIVE
NRBC AUTOMATED: 0 PER 100 WBC
OTHER OBSERVATIONS UA: ABNORMAL
PDW BLD-RTO: 13.1 % (ref 11.8–14.4)
PH UA: 7 (ref 5–6)
PLATELET # BLD: ABNORMAL K/UL (ref 138–453)
PLATELET ESTIMATE: ABNORMAL
PLATELET, FLUORESCENCE: 416 K/UL (ref 138–453)
PLATELET, IMMATURE FRACTION: 3.9 % (ref 1.1–10.3)
PMV BLD AUTO: ABNORMAL FL (ref 8.1–13.5)
POTASSIUM SERPL-SCNC: 3.9 MMOL/L (ref 3.7–5.3)
PROTEIN UA: NEGATIVE
RBC # BLD: 4.68 M/UL (ref 3.95–5.11)
RBC # BLD: ABNORMAL 10*6/UL
RBC UA: ABNORMAL /HPF (ref 0–4)
RENAL EPITHELIAL, UA: ABNORMAL /HPF
SEG NEUTROPHILS: 79 % (ref 36–65)
SEGMENTED NEUTROPHILS ABSOLUTE COUNT: 7.02 K/UL (ref 1.5–8.1)
SODIUM BLD-SCNC: 129 MMOL/L (ref 135–144)
SPECIFIC GRAVITY UA: 1 (ref 1.01–1.02)
TOTAL PROTEIN: 7.2 G/DL (ref 6.4–8.3)
TRICHOMONAS: ABNORMAL
TROPONIN INTERP: ABNORMAL
TROPONIN T: ABNORMAL NG/ML
TROPONIN, HIGH SENSITIVITY: 15 NG/L (ref 0–14)
TURBIDITY: ABNORMAL
URINE HGB: NEGATIVE
UROBILINOGEN, URINE: NORMAL
WBC # BLD: 8.9 K/UL (ref 3.5–11.3)
WBC # BLD: ABNORMAL 10*3/UL
WBC UA: ABNORMAL /HPF (ref 0–4)
YEAST: ABNORMAL

## 2021-08-17 PROCEDURE — 85025 COMPLETE CBC W/AUTO DIFF WBC: CPT

## 2021-08-17 PROCEDURE — 36415 COLL VENOUS BLD VENIPUNCTURE: CPT

## 2021-08-17 PROCEDURE — 96375 TX/PRO/DX INJ NEW DRUG ADDON: CPT

## 2021-08-17 PROCEDURE — 84484 ASSAY OF TROPONIN QUANT: CPT

## 2021-08-17 PROCEDURE — 2580000003 HC RX 258: Performed by: EMERGENCY MEDICINE

## 2021-08-17 PROCEDURE — 6360000004 HC RX CONTRAST MEDICATION: Performed by: EMERGENCY MEDICINE

## 2021-08-17 PROCEDURE — 96367 TX/PROPH/DG ADDL SEQ IV INF: CPT

## 2021-08-17 PROCEDURE — 2709999900 CT CHEST PULMONARY EMBOLISM W CONTRAST

## 2021-08-17 PROCEDURE — 6360000002 HC RX W HCPCS: Performed by: EMERGENCY MEDICINE

## 2021-08-17 PROCEDURE — 72132 CT LUMBAR SPINE W/DYE: CPT

## 2021-08-17 PROCEDURE — 99285 EMERGENCY DEPT VISIT HI MDM: CPT

## 2021-08-17 PROCEDURE — 87040 BLOOD CULTURE FOR BACTERIA: CPT

## 2021-08-17 PROCEDURE — 83605 ASSAY OF LACTIC ACID: CPT

## 2021-08-17 PROCEDURE — 80076 HEPATIC FUNCTION PANEL: CPT

## 2021-08-17 PROCEDURE — 93005 ELECTROCARDIOGRAM TRACING: CPT | Performed by: EMERGENCY MEDICINE

## 2021-08-17 PROCEDURE — 81001 URINALYSIS AUTO W/SCOPE: CPT

## 2021-08-17 PROCEDURE — 85055 RETICULATED PLATELET ASSAY: CPT

## 2021-08-17 PROCEDURE — 85379 FIBRIN DEGRADATION QUANT: CPT

## 2021-08-17 PROCEDURE — 96366 THER/PROPH/DIAG IV INF ADDON: CPT

## 2021-08-17 PROCEDURE — 83690 ASSAY OF LIPASE: CPT

## 2021-08-17 PROCEDURE — 96365 THER/PROPH/DIAG IV INF INIT: CPT

## 2021-08-17 PROCEDURE — 96376 TX/PRO/DX INJ SAME DRUG ADON: CPT

## 2021-08-17 PROCEDURE — 80048 BASIC METABOLIC PNL TOTAL CA: CPT

## 2021-08-17 RX ORDER — LEVOTHYROXINE SODIUM 0.03 MG/1
25 TABLET ORAL DAILY
COMMUNITY
Start: 2021-08-13 | End: 2021-11-03

## 2021-08-17 RX ORDER — ONDANSETRON 2 MG/ML
4 INJECTION INTRAMUSCULAR; INTRAVENOUS ONCE
Status: COMPLETED | OUTPATIENT
Start: 2021-08-17 | End: 2021-08-17

## 2021-08-17 RX ORDER — OXYCODONE HYDROCHLORIDE AND ACETAMINOPHEN 5; 325 MG/1; MG/1
1 TABLET ORAL EVERY 4 HOURS PRN
COMMUNITY
Start: 2021-08-10 | End: 2021-10-20 | Stop reason: ALTCHOICE

## 2021-08-17 RX ORDER — 0.9 % SODIUM CHLORIDE 0.9 %
1000 INTRAVENOUS SOLUTION INTRAVENOUS ONCE
Status: COMPLETED | OUTPATIENT
Start: 2021-08-17 | End: 2021-08-17

## 2021-08-17 RX ADMIN — PIPERACILLIN SODIUM AND TAZOBACTAM SODIUM 3375 MG: 3; .375 INJECTION, POWDER, LYOPHILIZED, FOR SOLUTION INTRAVENOUS at 19:37

## 2021-08-17 RX ADMIN — SODIUM CHLORIDE 1000 ML: 9 INJECTION, SOLUTION INTRAVENOUS at 11:50

## 2021-08-17 RX ADMIN — ONDANSETRON 4 MG: 2 INJECTION INTRAMUSCULAR; INTRAVENOUS at 21:02

## 2021-08-17 RX ADMIN — VANCOMYCIN HYDROCHLORIDE 2000 MG: 1 INJECTION, POWDER, LYOPHILIZED, FOR SOLUTION INTRAVENOUS at 15:05

## 2021-08-17 RX ADMIN — ONDANSETRON 4 MG: 2 INJECTION INTRAMUSCULAR; INTRAVENOUS at 11:49

## 2021-08-17 RX ADMIN — IOPAMIDOL 100 ML: 755 INJECTION, SOLUTION INTRAVENOUS at 12:54

## 2021-08-17 RX ADMIN — PIPERACILLIN SODIUM AND TAZOBACTAM SODIUM 3375 MG: 3; .375 INJECTION, POWDER, LYOPHILIZED, FOR SOLUTION INTRAVENOUS at 13:56

## 2021-08-17 ASSESSMENT — PAIN DESCRIPTION - ORIENTATION: ORIENTATION: LOWER

## 2021-08-17 ASSESSMENT — PAIN SCALES - GENERAL: PAINLEVEL_OUTOF10: 9

## 2021-08-17 ASSESSMENT — ENCOUNTER SYMPTOMS
NAUSEA: 1
VOMITING: 1
BACK PAIN: 1

## 2021-08-17 ASSESSMENT — PAIN DESCRIPTION - LOCATION: LOCATION: BACK

## 2021-08-17 ASSESSMENT — PAIN DESCRIPTION - FREQUENCY: FREQUENCY: CONTINUOUS

## 2021-08-17 NOTE — TELEPHONE ENCOUNTER
Per review of chart, pt called office yesterday advising they will be in to UC today. Dr. Guido Shin notified.

## 2021-08-17 NOTE — ED PROVIDER NOTES
[facility] ED  [address]  [place] [address]  Phone: [phone]        Pt Name: Karlee Javier  MRN: [id number]  [place] 1955  Date of evaluation: 8/17/21      CHIEF COMPLAINT     Chief Complaint   Patient presents with    Emesis         HISTORY OF PRESENT ILLNESS  (Location/Symptom, Timing/Onset, Context/Setting, Quality, Duration, Modifying Factors, Severity.)    Karlee Javier is a 78 y.o. female who presents with back pain nausea and vomiting. This 50-year-old female sent over from her doctor's office the patient developed back pain about a month ago was in [place] and ultimately diagnosed with osteomyelitis of her lumbar region the patient had a PICC line placed and is being given IV Rocephin the patient dates over the last 3 to 4 days she has had generalized body aches nausea vomiting she still has her back pain denies any numbness or weakness nothing she does makes her symptoms better or worse      REVIEW OF SYSTEMS    (2-9 systems for level 4, 10 or more for level 5)     Review of Systems   Constitutional: Positive for chills. Gastrointestinal: Positive for nausea and vomiting. Musculoskeletal: Positive for back pain and myalgias. All other systems reviewed and are negative. PAST MEDICAL HISTORY    has a past medical history of Chronic back pain, Depression, GERD (gastroesophageal reflux disease), Hyperlipidemia, Hypertension, and Morbid obesity ([address] SURGICAL HISTORY      has a past surgical history that includes Colonoscopy (2005); Cystoscopy (2005); Hysterectomy (1984); Hemorrhoid surgery; other surgical history; and Appendectomy (1982).     CURRENTMEDICATIONS       Previous Medications    ATENOLOL (TENORMIN) 50 MG TABLET    TAKE 1 TABLET DAILY    ATORVASTATIN (LIPITOR) 20 MG TABLET    TAKE 1 TABLET DAILY    BUPROPION (WELLBUTRIN XL) 300 MG EXTENDED RELEASE TABLET    TAKE 1 TABLET EVERY MORNING    CEFTRIAXONE (ROCEPHIN) 500 MG INJECTION    Infuse 2,000 mg intravenously every 24 hours    CIPROFLOXACIN (CIPRO) 750 MG TABLET    Take 1 tablet by mouth every 12 hours    CYCLOBENZAPRINE (FLEXERIL) 10 MG TABLET    Take 1 tablet by mouth 3 times daily as needed for Muscle spasms    FUROSEMIDE (LASIX) 20 MG TABLET    TAKE 1 TABLET DAILY    HANDICAP PLACARD MISC    by Does not apply route Issue parking placard for person with disability; Applicant meets the qualifying disability criteria. Length of time expected to have disability__X___Lifetime. Prescription expires in 5 years from issuing date. HANDICAP PLACARD MISC    by Does not apply route Issue parking placard for person with disability; Applicant meets the qualifying disability criteria. Length of time expected to have disability__X___Lifetime  Other ____. Prescription expires in 5 years from issuing date. LEVOTHYROXINE (SYNTHROID) 25 MCG TABLET    Take 25 mcg by mouth Daily    LOSARTAN (COZAAR) 25 MG TABLET    TAKE 1 TABLET DAILY    OXYCODONE-ACETAMINOPHEN (PERCOCET) 5-325 MG PER TABLET    Take 1 tablet by mouth every 4 hours as needed for Pain. PSYLLIUM PO    Take by mouth    SCOOTER MISC    Use daily as needed. STIMULANT LAXATIVE 8.6-50 MG PER TABLET    Take 2 tablets by mouth nightly       ALLERGIES     is allergic to benazepril, dynabac [dirithromycin], levothyroxine, lisinopril, and other. FAMILY HISTORY     She indicated that her mother is . She indicated that her father is . She indicated that her sister is alive. She indicated that both of her brothers are . She indicated that her maternal grandmother is . She indicated that the status of her paternal grandmother is unknown. She indicated that her other is alive.      family history includes Cancer in her brother and maternal grandmother; Diabetes in her paternal grandmother; Heart Disease in her brother, brother, and father; High Blood Pressure in her mother and sister; Lupus in an other family member. SOCIAL HISTORY      reports that she has been smoking cigarettes. She started smoking about 7 years ago. She has a 10.00 pack-year smoking history. She has never used smokeless tobacco. She reports current alcohol use. PHYSICAL EXAM    (up to 7 for level 4, 8 or more for level 5)   INITIAL VITALS:  height is 5' 2\" (1.575 m) and weight is 270 lb (122.5 kg). Her temperature is 96.2 °F (35.7 °C). Her blood pressure is 151/77 (abnormal) and her pulse is 107. Her respiration is 35 (abnormal) and oxygen saturation is 99%. Physical Exam  Vitals and nursing note reviewed. Constitutional:       Comments: Moderate distress secondary to HPI   HENT:      Head: Normocephalic and atraumatic. Eyes:      Conjunctiva/sclera: Conjunctivae normal.   Cardiovascular:      Rate and Rhythm: Regular rhythm. Tachycardia present. Pulmonary:      Effort: Pulmonary effort is normal. No respiratory distress. Breath sounds: Normal breath sounds. No stridor. No wheezing, rhonchi or rales. Abdominal:      General: Bowel sounds are normal. There is no distension. Palpations: Abdomen is soft. There is no mass. Tenderness: There is no abdominal tenderness. There is no right CVA tenderness, left CVA tenderness, guarding or rebound. Musculoskeletal:         General: Normal range of motion. Cervical back: Normal range of motion and neck supple. Comments: Tenderness palpation in the lumbar region no signs or symptoms of cauda equina   Skin:     General: Skin is warm and dry. Findings: No rash. Neurological:      General: No focal deficit present. Mental Status: She is alert. Psychiatric:      Comments:  The patient is tearful         DIFFERENTIAL DIAGNOSIS/ MDM:     IV will be established I will give the patient IV fluids check labs UA review the patient's previous medical record    Sepsis Times and Checklist  Vital Signs: BP: (!) 151/77  Pulse: 107  Resp: (!) 35  Temp: 96.2 °F (35.7 °C) SpO2: 99 %  SIRS (>2)   Temp > 38.3C or < 36C   HR > 90   RR > 20   WBC > 12 or < 4 or >10% bands  SIRS (>2) and confirmed or suspected source of infection = Sepsis        Intra - Abdominal Source  Mild to Moderate Severity = Ceftriaxone 1 gm IV plus Metronidazole 500 mg IV, if Penicillin allergy then Levofloxacin 500 mg IV  High Severity or High Risk = Zosyn 3.375 gm IV or Cefepime 2 gm IV plus Metronidazole 500 mg IV, if Penicillin allergy then Levofloxacin 500 mg IV    Meningitis  Ceftriaxone 2 gm IV plus Vancomycin 25 mg/kg IV, if Penicillin allergy then Levofloxacin 500 mg IV plus Vancomycin 25 mg/kg IV   If concern for viral meningitis then add Acyclovir 10 mg/kg IV using ideal body weight  If concern for Pneumococcal meningitis then Dexamethasone 10 mg IV prior to antibiotics  If concern for Listeria based on CSF gram stain then add Ampicillin 2 gm IV or Bactrim 5 mg/kg IV if severe Penicillin allergy    Urosepsis  Community acquired = Ceftriaxone 1 gm IV, if Penicillin allergy then Meropenem 1 gm IV  Hospital acquired = Zosyn 3.375 gm IV or Cefepime 1 gm IV, if Penicillin allergy then Meropenem 1 gm IV    Skin / Soft Tissue  Cellulitis / Erysipelas = Ceftriaxone 1 gm IV plus Vancomycin 15 mg/kg IV, if Penicillin allergy then Levofloxacin 500 gm IV  Necrotizing infection / Severe Cellulitis = Zosyn 3.375 gm IV plus Vancomycin 15 mg/kg IV, if Penicillin allergy then Clindamycin 900 mg IV plus Vancomycin 15 mg/kg IV plus Ciprofloxacin 500 mg IV    Respiratory  Community Acquired Pneumonia = Ceftriaxone 1 gm IV plus Azithromycin 500 mg IV, if Penicillin allergy then Levofloxacin 750 mg IV.    Hospital Acquired Pneumonia = Zosyn 4.5 gm IV plus Vancomycin 15 mg/kg IV, if Penicillin allergy then Meropenem 1 gm IV plus Vancomycin 15 mg/kg IV    Unknown Source  Zosyn 3.375 gm IV plus Vancomycin 15 mg/kg IV, if Penicillin allergy then Meropenem 1 gm IV plus Vancomycin 15 mg/kg IV        DIAGNOSTIC RESULTS     EKG: is normal alignment of the spine. The vertebral body   heights are maintained.  No acute fracture is identified. There is slight indistinctness of the endplates at U4-C7 suspicious for mild   acute osteomyelitis. SOFT TISSUES: 2.6 x 2.0 cm hypodense lesion within the left psoas muscle at   L4-L5 is identified suspicious for a small psoas abscess. L1-L2: There is no significant disc protrusion, central spinal canal stenosis   or neural foraminal narrowing. L2-L3: There is no significant disc protrusion, central spinal canal stenosis   or neural foraminal narrowing. L3-L4: Annular disc bulge facet arthropathy and ligament hypertrophy likely   results in mild central canal stenosis.  No gross epidural abscess is   detected. L4-L5: There is no significant disc protrusion, central spinal canal stenosis   or neural foraminal narrowing. L5-S1: There is no significant disc protrusion, central spinal canal stenosis   or neural foraminal narrowing.                 Preliminary result by Joyce Rosales MD (08/17/21 13:24:15)                Impression:    Findings compatible with mild acute osteomyelitis at L3-L4.  Mild central   canal stenosis is suspected     2.6 x 2.0 cm left psoas abscess located at L4-5                     CT CHEST PULMONARY EMBOLISM W CONTRAST (Final result)  Result time 08/17/21 13:50:59  Final result by Zeenat Jean MD (08/17/21 13:50:59)                Impression:    1.  No evidence of pulmonary embolism or acute pulmonary abnormality. 2. Paula Mood of old granulomatous disease with stable 7 mm pleural based   nodule in the posterior right upper lobe, for which no further evaluation is   necessary. RECOMMENDATIONS:               Narrative:    EXAMINATION:   CTA OF THE CHEST 8/17/2021 12:36 pm     TECHNIQUE:   CTA of the chest was performed after the administration of intravenous   contrast.  Multiplanar reformatted images are provided for review.   MIP   images are provided for review. Dose modulation, iterative reconstruction,   and/or weight based adjustment of the mA/kV was utilized to reduce the   radiation dose to as low as reasonably achievable. COMPARISON:   Chest radiograph 08/09/2021.  Chest CT 01/12/2010. HISTORY:   ORDERING SYSTEM PROVIDED HISTORY: elevated d-dimer, tachycardia   TECHNOLOGIST PROVIDED HISTORY:   elevated d-dimer, tachycardia   Decision Support Exception - unselect if not a suspected or confirmed   emergency medical condition->Emergency Medical Condition (MA)   Reason for Exam: tachycardic, elevated d-dimer,  patient developed back pain   about a month ago was in [place] and ultimately diagnosed with osteomyelitis   of her lumbar region the patient had a PICC line placed and is being given IV   Rocephin the patient dates over the last 3 to 4 days she has had generalized   body aches nausea vomiting she still has her back pain     FINDINGS:   Pulmonary Arteries: Pulmonary arteries are adequately opacified for   evaluation.  No evidence of intraluminal filling defect to suggest pulmonary   embolism.  Main pulmonary artery is normal in caliber. Mediastinum: No evidence of mediastinal lymphadenopathy.  The heart and   pericardium demonstrate no acute abnormality.  There is no acute abnormality   of the thoracic aorta. Lungs/pleura: Sequela of old granulomatous disease.  7 mm pleural based   nodule in the posterior right upper lobe on axial image 27.  Subsegmental   atelectasis or scarring is present in the right upper lobe and right lower   lobe. Upper Abdomen: Limited images of the upper abdomen are unremarkable. Soft Tissues/Bones: No acute bone or soft tissue abnormality.                    Interpretation per the Radiologist below, if available at the time of this note:    [facility] (Preliminary result)  Result time 08/17/21 13:24:15  Preliminary result by Shavon Balderrama MD (08/17/21 13:24:15) Impression:    Findings compatible with mild acute osteomyelitis at L3-L4.  Mild central   canal stenosis is suspected     2.6 x 2.0 cm left psoas abscess located at L4-5                   LABS:  Results for orders placed or performed during the hospital encounter of 08/17/21   CBC Auto Differential   Result Value Ref Range    WBC 8.9 3.5 - 11.3 k/uL    RBC 4.68 3.95 - 5.11 m/uL    Hemoglobin 13.3 11.9 - 15.1 g/dL    Hematocrit 40.0 36.3 - 47.1 %    MCV 85.5 82.6 - 102.9 fL    MCH 28.4 25.2 - 33.5 pg    MCHC 33.3 25.2 - 33.5 g/dL    RDW 13.1 11.8 - 14.4 %    Platelets See Reflexed IPF Result 138 - 453 k/uL    MPV NOT REPORTED 8.1 - 13.5 fL    NRBC Automated 0.0 0.0 per 100 WBC    Differential Type NOT REPORTED     WBC Morphology NOT REPORTED     RBC Morphology NOT REPORTED     Platelet Estimate NOT REPORTED     Seg Neutrophils 79 (H) 36 - 65 %    Lymphocytes 11 (L) 24 - 43 %    Monocytes 9 3 - 12 %    Eosinophils % 1 1 - 4 %    Basophils 0 0 - 2 %    Immature Granulocytes 1 (H) 0 %    Segs Absolute 7.02 1.50 - 8.10 k/uL    Absolute Lymph # 1.00 (L) 1.10 - 3.70 k/uL    Absolute Mono # 0.78 0.10 - 1.20 k/uL    Absolute Eos # 0.05 0.00 - 0.44 k/uL    Basophils Absolute <0.03 0.00 - 0.20 k/uL    Absolute Immature Granulocyte 0.05 0.00 - 0.30 k/uL   Basic Metabolic Panel   Result Value Ref Range    Glucose 127 (H) 70 - 99 mg/dL    BUN 10 8 - 23 mg/dL    CREATININE 0.53 0.50 - 0.90 mg/dL    Bun/Cre Ratio 19 9 - 20    Calcium 9.6 8.6 - 10.4 mg/dL    Sodium 129 (L) 135 - 144 mmol/L    Potassium 3.9 3.7 - 5.3 mmol/L    Chloride 96 (L) 98 - 107 mmol/L    CO2 18 (L) 20 - 31 mmol/L    Anion Gap 15 9 - 17 mmol/L    GFR Non-African American >60 >60 mL/min    GFR African American >60 >60 mL/min    GFR Comment          GFR Staging NOT REPORTED    Hepatic Function Panel   Result Value Ref Range    Albumin 3.1 (L) 3.5 - 5.2 g/dL    Alkaline Phosphatase 112 (H) 35 - 104 U/L    ALT 26 5 - 33 U/L    AST 32 (H) <32 U/L    Total Bilirubin 0.55 0.3 - 1.2 mg/dL    Bilirubin, Direct 0.12 <0.31 mg/dL    Bilirubin, Indirect 0.43 0.00 - 1.00 mg/dL    Total Protein 7.2 6.4 - 8.3 g/dL    Globulin 4.1 (H) 1.5 - 3.8 g/dL    Albumin/Globulin Ratio 0.8 (L) 1.0 - 2.5   Troponin   Result Value Ref Range    Troponin, High Sensitivity 15 (H) 0 - 14 ng/L    Troponin T NOT REPORTED <0.03 ng/mL    Troponin Interp NOT REPORTED    Lactate, Sepsis   Result Value Ref Range    Lactic Acid, Sepsis 2.1 (H) 0.5 - 1.9 mmol/L    Lactic Acid, Sepsis, Whole Blood NOT REPORTED 0.5 - 1.9 mmol/L   Lactate, Sepsis   Result Value Ref Range    Lactic Acid, Sepsis 2.1 (H) 0.5 - 1.9 mmol/L    Lactic Acid, Sepsis, Whole Blood NOT REPORTED 0.5 - 1.9 mmol/L   D-Dimer, Quantitative   Result Value Ref Range    D-Dimer, Quant 1.80 (H) 0.00 - 0.59 mg/L FEU   Lipase   Result Value Ref Range    Lipase 24 13 - 60 U/L   Immature Platelet Fraction   Result Value Ref Range    Platelet, Immature Fraction 3.9 1.1 - 10.3 %    Platelet, Fluorescence 416 138 - 453 k/uL   EKG 12 Lead   Result Value Ref Range    Ventricular Rate 102 BPM    Atrial Rate 102 BPM    P-R Interval 164 ms    QRS Duration 74 ms    Q-T Interval 364 ms    QTc Calculation (Bazett) 474 ms    P Axis 41 degrees    R Axis 54 degrees    T Axis 50 degrees           EMERGENCY DEPARTMENT COURSE:   Vitals:    Vitals:    08/17/21 1111 08/17/21 1315 08/17/21 1400   BP: 136/70 (!) 156/70 (!) 151/77   Pulse: 125 101 107   Resp: 25 26 (!) 35   Temp: 96.2 °F (35.7 °C)     SpO2: 100% 100% 99%   Weight: 270 lb (122.5 kg)     Height: 5' 2\" (1.575 m)       -------------------------  BP: (!) 151/77, Temp: 96.2 °F (35.7 °C), Pulse: 107, Resp: (!) 35      RE-EVALUATION:  The patient is noted to have osteomyelitis of the lumbar spine also has a psoas abscess given this I do feel she warrants evaluation by infectious disease and neurosurgery I did discuss the patient with my hospitalist who is also agreeable with this so I will contact the hospitalist at [facility] for transfer    CONSULTS:  The hospitalist at [facility] is kind enough to accept the patient to her service    PROCEDURES:  None    FINAL IMPRESSION      1. Psoas abscess ([facility] [address]    2. Osteomyelitis of lumbar spine ([facility] [address]          DISPOSITION/PLAN   DISPOSITION        CONDITION ON DISPOSITION:   Stable    PATIENT REFERRED TO:  No follow-up provider specified. DISCHARGE MEDICATIONS:  New Prescriptions    No medications on file       (Please note that portions of this note were completed with a voicerecognition program.  Efforts were made to edit the dictations but occasionally words are mis-transcribed.)    Priyanka Gaviria MD,, MD, F.A.C.E.P.   Attending Emergency Medicine Physician       Priyanka Gaviria MD  08/17/21 0472

## 2021-08-17 NOTE — PROGRESS NOTES
Pharmacy Note  Vancomycin Consult    Eliazar Feliciano is a 78 y.o. female ordered Vancomycin for SSTI; consult received from Dr. Nguyen Gates to manage therapy. Also receiving zosyn. Patient Active Problem List   Diagnosis    Hypertension    Hyperlipidemia    GERD (gastroesophageal reflux disease)    Morbid obesity (HCC)    Depression    Chronic back pain       Allergies:  Benazepril, Dynabac [dirithromycin], Levothyroxine, Lisinopril, and Other     Temp max: 99.2    Recent Labs     08/17/21  1145   BUN 10       Recent Labs     08/17/21  1145   CREATININE 0.53       Recent Labs     08/17/21  1145   WBC 8.9       No intake or output data in the 24 hours ending 08/17/21 1418    Culture Date      Source                       Results       Ht Readings from Last 1 Encounters:   08/17/21 5' 2\" (1.575 m)        Wt Readings from Last 1 Encounters:   08/17/21 270 lb (122.5 kg)         Estimated Creatinine Clearance: 129 mL/min (based on SCr of 0.53 mg/dL). GOAL TROUGH: 10-15    Assessment/Plan:  Will initiate vancomycin 2000 mg x 1 dose then 1250 mg IV every 12 hours. Timing of trough level will be determined based on culture results, renal function, and clinical response. Thank you for the consult. Will continue to follow.     Gisella Crenshaw, [facility]  8/17/2021  2:20 PM ok

## 2021-08-18 ENCOUNTER — HOSPITAL ENCOUNTER (INPATIENT)
Age: 66
LOS: 1 days | Discharge: HOME HEALTH | DRG: 372 | End: 2021-08-19
Attending: INTERNAL MEDICINE | Admitting: STUDENT IN AN ORGANIZED HEALTH CARE EDUCATION/TRAINING PROGRAM
Payer: MEDICARE

## 2021-08-18 ENCOUNTER — APPOINTMENT (OUTPATIENT)
Dept: CT IMAGING | Age: 66
DRG: 372 | End: 2021-08-18
Attending: INTERNAL MEDICINE
Payer: MEDICARE

## 2021-08-18 VITALS
RESPIRATION RATE: 14 BRPM | TEMPERATURE: 98.7 F | HEIGHT: 62 IN | WEIGHT: 270 LBS | HEART RATE: 106 BPM | SYSTOLIC BLOOD PRESSURE: 112 MMHG | DIASTOLIC BLOOD PRESSURE: 70 MMHG | BODY MASS INDEX: 49.69 KG/M2 | OXYGEN SATURATION: 96 %

## 2021-08-18 DIAGNOSIS — M46.26 OSTEOMYELITIS OF LUMBAR SPINE (HCC): ICD-10-CM

## 2021-08-18 DIAGNOSIS — K68.12 PSOAS ABSCESS (HCC): Primary | ICD-10-CM

## 2021-08-18 PROBLEM — F41.9 ANXIETY: Status: ACTIVE | Noted: 2021-08-18

## 2021-08-18 PROBLEM — E87.20 LACTIC ACID INCREASED: Status: ACTIVE | Noted: 2021-08-18

## 2021-08-18 PROBLEM — E87.1 HYPONATREMIA: Status: ACTIVE | Noted: 2021-08-18

## 2021-08-18 LAB
ALBUMIN SERPL-MCNC: 2.7 G/DL (ref 3.5–5.2)
ALBUMIN/GLOBULIN RATIO: 0.7 (ref 1–2.5)
ALP BLD-CCNC: 98 U/L (ref 35–104)
ALT SERPL-CCNC: 23 U/L (ref 5–33)
ANION GAP SERPL CALCULATED.3IONS-SCNC: 14 MMOL/L (ref 9–17)
AST SERPL-CCNC: 29 U/L
BILIRUB SERPL-MCNC: 0.48 MG/DL (ref 0.3–1.2)
BUN BLDV-MCNC: 7 MG/DL (ref 8–23)
BUN/CREAT BLD: ABNORMAL (ref 9–20)
C-REACTIVE PROTEIN: 79.8 MG/L (ref 0–5)
CALCIUM SERPL-MCNC: 8.9 MG/DL (ref 8.6–10.4)
CHLORIDE BLD-SCNC: 101 MMOL/L (ref 98–107)
CO2: 15 MMOL/L (ref 20–31)
CREAT SERPL-MCNC: 0.52 MG/DL (ref 0.5–0.9)
EKG ATRIAL RATE: 102 BPM
EKG P AXIS: 41 DEGREES
EKG P-R INTERVAL: 164 MS
EKG Q-T INTERVAL: 364 MS
EKG QRS DURATION: 74 MS
EKG QTC CALCULATION (BAZETT): 474 MS
EKG R AXIS: 54 DEGREES
EKG T AXIS: 50 DEGREES
EKG VENTRICULAR RATE: 102 BPM
GFR AFRICAN AMERICAN: >60 ML/MIN
GFR NON-AFRICAN AMERICAN: >60 ML/MIN
GFR SERPL CREATININE-BSD FRML MDRD: ABNORMAL ML/MIN/{1.73_M2}
GFR SERPL CREATININE-BSD FRML MDRD: ABNORMAL ML/MIN/{1.73_M2}
GLUCOSE BLD-MCNC: 111 MG/DL (ref 70–99)
HCT VFR BLD CALC: 42.2 % (ref 36.3–47.1)
HEMOGLOBIN: 12.4 G/DL (ref 11.9–15.1)
INR BLD: 1.4
MCH RBC QN AUTO: 28.4 PG (ref 25.2–33.5)
MCHC RBC AUTO-ENTMCNC: 29.4 G/DL (ref 28.4–34.8)
MCV RBC AUTO: 96.6 FL (ref 82.6–102.9)
NRBC AUTOMATED: 0 PER 100 WBC
PDW BLD-RTO: 13.4 % (ref 11.8–14.4)
PLATELET # BLD: 298 K/UL (ref 138–453)
PMV BLD AUTO: 10.7 FL (ref 8.1–13.5)
POTASSIUM SERPL-SCNC: 3.6 MMOL/L (ref 3.7–5.3)
PROTHROMBIN TIME: 15 SEC (ref 9.1–12.3)
RBC # BLD: 4.37 M/UL (ref 3.95–5.11)
SEDIMENTATION RATE, ERYTHROCYTE: 81 MM (ref 0–30)
SODIUM BLD-SCNC: 130 MMOL/L (ref 135–144)
TOTAL PROTEIN: 6.5 G/DL (ref 6.4–8.3)
WBC # BLD: 6.4 K/UL (ref 3.5–11.3)

## 2021-08-18 PROCEDURE — 1200000000 HC SEMI PRIVATE

## 2021-08-18 PROCEDURE — 85610 PROTHROMBIN TIME: CPT

## 2021-08-18 PROCEDURE — 6360000002 HC RX W HCPCS: Performed by: INTERNAL MEDICINE

## 2021-08-18 PROCEDURE — 2580000003 HC RX 258: Performed by: INTERNAL MEDICINE

## 2021-08-18 PROCEDURE — 2709999900 CT ABSCESS DRAINAGE

## 2021-08-18 PROCEDURE — 6360000002 HC RX W HCPCS: Performed by: EMERGENCY MEDICINE

## 2021-08-18 PROCEDURE — 80053 COMPREHEN METABOLIC PANEL: CPT

## 2021-08-18 PROCEDURE — 2580000003 HC RX 258: Performed by: EMERGENCY MEDICINE

## 2021-08-18 PROCEDURE — 6360000002 HC RX W HCPCS: Performed by: RADIOLOGY

## 2021-08-18 PROCEDURE — 6370000000 HC RX 637 (ALT 250 FOR IP): Performed by: CLINICAL NURSE SPECIALIST

## 2021-08-18 PROCEDURE — 6360000002 HC RX W HCPCS: Performed by: CLINICAL NURSE SPECIALIST

## 2021-08-18 PROCEDURE — 85027 COMPLETE CBC AUTOMATED: CPT

## 2021-08-18 PROCEDURE — 87070 CULTURE OTHR SPECIMN AEROBIC: CPT

## 2021-08-18 PROCEDURE — 2580000003 HC RX 258: Performed by: CLINICAL NURSE SPECIALIST

## 2021-08-18 PROCEDURE — 36415 COLL VENOUS BLD VENIPUNCTURE: CPT

## 2021-08-18 PROCEDURE — 6370000000 HC RX 637 (ALT 250 FOR IP): Performed by: INTERNAL MEDICINE

## 2021-08-18 PROCEDURE — 99222 1ST HOSP IP/OBS MODERATE 55: CPT | Performed by: INTERNAL MEDICINE

## 2021-08-18 PROCEDURE — 87205 SMEAR GRAM STAIN: CPT

## 2021-08-18 PROCEDURE — 6370000000 HC RX 637 (ALT 250 FOR IP): Performed by: EMERGENCY MEDICINE

## 2021-08-18 PROCEDURE — 99223 1ST HOSP IP/OBS HIGH 75: CPT | Performed by: INTERNAL MEDICINE

## 2021-08-18 PROCEDURE — 85652 RBC SED RATE AUTOMATED: CPT

## 2021-08-18 PROCEDURE — 86403 PARTICLE AGGLUT ANTBDY SCRN: CPT

## 2021-08-18 PROCEDURE — 86140 C-REACTIVE PROTEIN: CPT

## 2021-08-18 PROCEDURE — 87075 CULTR BACTERIA EXCEPT BLOOD: CPT

## 2021-08-18 PROCEDURE — 0K9G30Z DRAINAGE OF LEFT TRUNK MUSCLE WITH DRAINAGE DEVICE, PERCUTANEOUS APPROACH: ICD-10-PCS | Performed by: PHYSICIAN ASSISTANT

## 2021-08-18 PROCEDURE — 96366 THER/PROPH/DIAG IV INF ADDON: CPT

## 2021-08-18 RX ORDER — LORAZEPAM 1 MG/1
1 TABLET ORAL EVERY 6 HOURS PRN
Status: DISCONTINUED | OUTPATIENT
Start: 2021-08-18 | End: 2021-08-19 | Stop reason: HOSPADM

## 2021-08-18 RX ORDER — CYCLOBENZAPRINE HCL 10 MG
10 TABLET ORAL 3 TIMES DAILY PRN
Status: DISCONTINUED | OUTPATIENT
Start: 2021-08-18 | End: 2021-08-19 | Stop reason: HOSPADM

## 2021-08-18 RX ORDER — ATORVASTATIN CALCIUM 20 MG/1
20 TABLET, FILM COATED ORAL DAILY
Status: DISCONTINUED | OUTPATIENT
Start: 2021-08-18 | End: 2021-08-19 | Stop reason: HOSPADM

## 2021-08-18 RX ORDER — BUPROPION HYDROCHLORIDE 150 MG/1
300 TABLET ORAL DAILY
Status: DISCONTINUED | OUTPATIENT
Start: 2021-08-18 | End: 2021-08-19 | Stop reason: HOSPADM

## 2021-08-18 RX ORDER — ATENOLOL 50 MG/1
50 TABLET ORAL DAILY
Status: DISCONTINUED | OUTPATIENT
Start: 2021-08-18 | End: 2021-08-19 | Stop reason: HOSPADM

## 2021-08-18 RX ORDER — SODIUM CHLORIDE 9 MG/ML
INJECTION, SOLUTION INTRAVENOUS CONTINUOUS
Status: DISCONTINUED | OUTPATIENT
Start: 2021-08-18 | End: 2021-08-19

## 2021-08-18 RX ORDER — VANCOMYCIN HYDROCHLORIDE 1 G/20ML
INJECTION, POWDER, LYOPHILIZED, FOR SOLUTION INTRAVENOUS
Status: DISCONTINUED
Start: 2021-08-18 | End: 2021-08-18 | Stop reason: HOSPADM

## 2021-08-18 RX ORDER — FUROSEMIDE 20 MG/1
20 TABLET ORAL DAILY
Status: DISCONTINUED | OUTPATIENT
Start: 2021-08-18 | End: 2021-08-19 | Stop reason: HOSPADM

## 2021-08-18 RX ORDER — SODIUM CHLORIDE 0.9 % (FLUSH) 0.9 %
5-40 SYRINGE (ML) INJECTION EVERY 12 HOURS SCHEDULED
Status: DISCONTINUED | OUTPATIENT
Start: 2021-08-18 | End: 2021-08-19 | Stop reason: HOSPADM

## 2021-08-18 RX ORDER — MAGNESIUM SULFATE 1 G/100ML
1000 INJECTION INTRAVENOUS PRN
Status: DISCONTINUED | OUTPATIENT
Start: 2021-08-18 | End: 2021-08-19 | Stop reason: HOSPADM

## 2021-08-18 RX ORDER — DIPHENHYDRAMINE HCL 25 MG
50 TABLET ORAL ONCE
Status: COMPLETED | OUTPATIENT
Start: 2021-08-18 | End: 2021-08-18

## 2021-08-18 RX ORDER — SODIUM CHLORIDE 0.9 % (FLUSH) 0.9 %
10 SYRINGE (ML) INJECTION PRN
Status: DISCONTINUED | OUTPATIENT
Start: 2021-08-18 | End: 2021-08-19 | Stop reason: HOSPADM

## 2021-08-18 RX ORDER — CEFTRIAXONE 500 MG/1
2000 INJECTION, POWDER, FOR SOLUTION INTRAMUSCULAR; INTRAVENOUS EVERY 24 HOURS
Status: DISCONTINUED | OUTPATIENT
Start: 2021-08-18 | End: 2021-08-18

## 2021-08-18 RX ORDER — ACETAMINOPHEN 325 MG/1
650 TABLET ORAL EVERY 6 HOURS PRN
Status: DISCONTINUED | OUTPATIENT
Start: 2021-08-18 | End: 2021-08-19 | Stop reason: HOSPADM

## 2021-08-18 RX ORDER — LOSARTAN POTASSIUM 25 MG/1
25 TABLET ORAL DAILY
Status: DISCONTINUED | OUTPATIENT
Start: 2021-08-18 | End: 2021-08-19 | Stop reason: HOSPADM

## 2021-08-18 RX ORDER — VANCOMYCIN HYDROCHLORIDE 500 MG/10ML
INJECTION, POWDER, LYOPHILIZED, FOR SOLUTION INTRAVENOUS
Status: DISCONTINUED
Start: 2021-08-18 | End: 2021-08-18 | Stop reason: HOSPADM

## 2021-08-18 RX ORDER — ONDANSETRON 4 MG/1
4 TABLET, ORALLY DISINTEGRATING ORAL EVERY 8 HOURS PRN
Status: DISCONTINUED | OUTPATIENT
Start: 2021-08-18 | End: 2021-08-19 | Stop reason: HOSPADM

## 2021-08-18 RX ORDER — MIDAZOLAM HYDROCHLORIDE 2 MG/2ML
INJECTION, SOLUTION INTRAMUSCULAR; INTRAVENOUS
Status: COMPLETED | OUTPATIENT
Start: 2021-08-18 | End: 2021-08-18

## 2021-08-18 RX ORDER — LEVOTHYROXINE SODIUM 0.03 MG/1
25 TABLET ORAL DAILY
Status: DISCONTINUED | OUTPATIENT
Start: 2021-08-18 | End: 2021-08-19 | Stop reason: HOSPADM

## 2021-08-18 RX ORDER — POTASSIUM CHLORIDE 20 MEQ/1
40 TABLET, EXTENDED RELEASE ORAL PRN
Status: DISCONTINUED | OUTPATIENT
Start: 2021-08-18 | End: 2021-08-19 | Stop reason: HOSPADM

## 2021-08-18 RX ORDER — SODIUM CHLORIDE 9 MG/ML
25 INJECTION, SOLUTION INTRAVENOUS PRN
Status: DISCONTINUED | OUTPATIENT
Start: 2021-08-18 | End: 2021-08-19 | Stop reason: HOSPADM

## 2021-08-18 RX ORDER — POLYETHYLENE GLYCOL 3350 17 G/17G
17 POWDER, FOR SOLUTION ORAL DAILY PRN
Status: DISCONTINUED | OUTPATIENT
Start: 2021-08-18 | End: 2021-08-19 | Stop reason: HOSPADM

## 2021-08-18 RX ORDER — ONDANSETRON 2 MG/ML
4 INJECTION INTRAMUSCULAR; INTRAVENOUS EVERY 6 HOURS PRN
Status: DISCONTINUED | OUTPATIENT
Start: 2021-08-18 | End: 2021-08-19 | Stop reason: HOSPADM

## 2021-08-18 RX ORDER — OXYCODONE HYDROCHLORIDE AND ACETAMINOPHEN 5; 325 MG/1; MG/1
1 TABLET ORAL EVERY 4 HOURS PRN
Status: DISCONTINUED | OUTPATIENT
Start: 2021-08-18 | End: 2021-08-19 | Stop reason: HOSPADM

## 2021-08-18 RX ORDER — SENNA AND DOCUSATE SODIUM 50; 8.6 MG/1; MG/1
2 TABLET, FILM COATED ORAL NIGHTLY
Status: DISCONTINUED | OUTPATIENT
Start: 2021-08-18 | End: 2021-08-19 | Stop reason: HOSPADM

## 2021-08-18 RX ORDER — FAMOTIDINE 20 MG/1
20 TABLET, FILM COATED ORAL 2 TIMES DAILY
Status: DISCONTINUED | OUTPATIENT
Start: 2021-08-18 | End: 2021-08-19 | Stop reason: HOSPADM

## 2021-08-18 RX ORDER — ACETAMINOPHEN 650 MG/1
650 SUPPOSITORY RECTAL EVERY 6 HOURS PRN
Status: DISCONTINUED | OUTPATIENT
Start: 2021-08-18 | End: 2021-08-19 | Stop reason: HOSPADM

## 2021-08-18 RX ORDER — FENTANYL CITRATE 50 UG/ML
INJECTION, SOLUTION INTRAMUSCULAR; INTRAVENOUS
Status: COMPLETED | OUTPATIENT
Start: 2021-08-18 | End: 2021-08-18

## 2021-08-18 RX ORDER — POTASSIUM CHLORIDE 7.45 MG/ML
10 INJECTION INTRAVENOUS PRN
Status: DISCONTINUED | OUTPATIENT
Start: 2021-08-18 | End: 2021-08-19 | Stop reason: HOSPADM

## 2021-08-18 RX ADMIN — SODIUM CHLORIDE: 9 INJECTION, SOLUTION INTRAVENOUS at 11:35

## 2021-08-18 RX ADMIN — DIPHENHYDRAMINE HYDROCHLORIDE 50 MG: 25 TABLET ORAL at 00:41

## 2021-08-18 RX ADMIN — MIDAZOLAM HYDROCHLORIDE 1 MG: 1 INJECTION, SOLUTION INTRAMUSCULAR; INTRAVENOUS at 15:53

## 2021-08-18 RX ADMIN — ATENOLOL 50 MG: 50 TABLET ORAL at 12:48

## 2021-08-18 RX ADMIN — CEFEPIME HYDROCHLORIDE 1000 MG: 1 INJECTION, POWDER, FOR SOLUTION INTRAMUSCULAR; INTRAVENOUS at 22:23

## 2021-08-18 RX ADMIN — VANCOMYCIN HYDROCHLORIDE 1250 MG: 10 INJECTION, POWDER, LYOPHILIZED, FOR SOLUTION INTRAVENOUS at 18:27

## 2021-08-18 RX ADMIN — ATORVASTATIN CALCIUM 20 MG: 20 TABLET, FILM COATED ORAL at 12:48

## 2021-08-18 RX ADMIN — LORAZEPAM 1 MG: 1 TABLET ORAL at 12:48

## 2021-08-18 RX ADMIN — VANCOMYCIN HYDROCHLORIDE 1500 MG: 500 INJECTION, POWDER, LYOPHILIZED, FOR SOLUTION INTRAVENOUS at 02:18

## 2021-08-18 RX ADMIN — SODIUM CHLORIDE, PRESERVATIVE FREE 10 ML: 5 INJECTION INTRAVENOUS at 11:39

## 2021-08-18 RX ADMIN — OXYCODONE HYDROCHLORIDE AND ACETAMINOPHEN 1 TABLET: 5; 325 TABLET ORAL at 23:37

## 2021-08-18 RX ADMIN — CEFEPIME HYDROCHLORIDE 1000 MG: 1 INJECTION, POWDER, FOR SOLUTION INTRAMUSCULAR; INTRAVENOUS at 11:35

## 2021-08-18 RX ADMIN — LOSARTAN POTASSIUM 25 MG: 25 TABLET, FILM COATED ORAL at 12:48

## 2021-08-18 RX ADMIN — FAMOTIDINE 20 MG: 20 TABLET, FILM COATED ORAL at 21:07

## 2021-08-18 RX ADMIN — SODIUM CHLORIDE, PRESERVATIVE FREE 10 ML: 5 INJECTION INTRAVENOUS at 21:09

## 2021-08-18 RX ADMIN — BUPROPION HYDROCHLORIDE 300 MG: 150 TABLET, EXTENDED RELEASE ORAL at 12:47

## 2021-08-18 RX ADMIN — PIPERACILLIN AND TAZOBACTAM 3375 MG: 3; .375 INJECTION, POWDER, LYOPHILIZED, FOR SOLUTION INTRAVENOUS at 01:28

## 2021-08-18 RX ADMIN — FENTANYL CITRATE 50 MCG: 50 INJECTION, SOLUTION INTRAMUSCULAR; INTRAVENOUS at 15:53

## 2021-08-18 ASSESSMENT — PAIN SCALES - GENERAL
PAINLEVEL_OUTOF10: 0
PAINLEVEL_OUTOF10: 0
PAINLEVEL_OUTOF10: 6
PAINLEVEL_OUTOF10: 0

## 2021-08-18 NOTE — H&P
[facility]  Office: [address], DO, Paige Soliman, DO, Gerald Patee, DO, Prema Spire Blood, DO, Atilio Gaxiola MD, Toya Weaver MD, Garett Curry MD, Jeff Segovia MD, Akira Velasquez MD, Allison Tinsley MD, Alpesh aCballero MD, Rachana Nance, DO, Tiera Poole MD, Bernice Mcguire, DO, Fuentes Bynum MD,  Ronit Rota, DO, Aida Evans MD, Anibal Downey MD, Mat Long MD, Jose Luis Figueredo MD, Linh Seay MD, Maninder Proctor MD, Paris Dominguez MD, Jayesh Guan, [facility], [facility], CNP, Khadijah Guardado, CNP, Naren Hancock, CNS, Surinder Monahan, CNP, Luisa Barfield, CNP, Rey Mari, CNP, Heddy Sacks, CNP, Leta Porras, CNP, Lawanda Patel PA-C, Mine Avila, [facility], Josefina Lyons, CNP, Chayito Sharpe, CNP, Georgi Patterson, CNP, Madhuri Rollins, CNP, Linda Kumari, CNP, Blair Quinteros, CNP, Magdalena Parks, CNP, Esthela Zurita, CNP         [address]    HISTORY AND PHYSICAL EXAMINATION            Date:   8/18/2021  Patient name:  Eliazar Feliciano  Date of admission:  8/18/2021  9:10 AM  MRN:   [id number]  Account:  [de-identified]  YOB: 1955  PCP:    Rola Benjamin DO  Room:   0329/0329-02  Code Status:    Full Code    Chief Complaint:     Back pain, Osteomyelitis, psoas muscle abscess    History Obtained From:     patient, electronic medical record    History of Present Illness:     Eliazar Feliciano is a 78 y.o. Non- / non  female who presents with No chief complaint on file. and is admitted to the hospital for the management of Psoas abscess ([facility] [address] Pt is a 78 yr old  female with HTN, MO, anxiety/ depression who presented to [facility] with 1-2 days of intractable Nausea/ vomiting. She has felt warm, but no fever or chills. She drove to [place] with her  on 7/25/2021. She was experiencing some low back pain before the drive, and during the trip.   She laid in bed and the pain was unbearable. She walked to the bathroom and fell on the floor on 7/28/2021 and was taken to the hospital.  She was found to have osteomyelitis of L3-L4. She denies any numbness, saddle paresthesias. She was transferred from [facility] to [place] and saw ID. It was recommended that she get a Picc line and receive IV antibiotics. She wanted to return to [place]. So she was given oral abx and drove home. She then had a Picc line placed in [place] on 8/9/2021 ordered by Dr. Radha Monsivais, her PCP. She has been taking Tylenol and Percocet for pain. She started getting Nausea and vomiting for unclear reasons for the last 2 days. No fevers, cough, cp, SOB. She still has the same amount of back pain. [facility] ED found a 2.6 x 2.0 cm left psoas muscle abscess at L4-L5. She was transferred to [facility]. [facility] for IR to drain it. She is NPO, no more N/v. Past Medical History:     Past Medical History:   Diagnosis Date    Chronic back pain     Depression     GERD (gastroesophageal reflux disease)     Hyperlipidemia     Hypertension     Morbid obesity ([address]         Past Surgical History:     Past Surgical History:   Procedure Laterality Date    APPENDECTOMY  1982    COLONOSCOPY  2005    CYSTOSCOPY  2005    HEMORRHOID SURGERY      HYSTERECTOMY  1984    partial - both ovaries remain; done for uterine fibroid    OTHER SURGICAL HISTORY      fissure repair        Medications Prior to Admission:     Prior to Admission medications    Medication Sig Start Date End Date Taking? Authorizing Provider   levothyroxine (SYNTHROID) 25 MCG tablet Take 25 mcg by mouth Daily 8/13/21  Yes Historical Provider, MD   oxyCODONE-acetaminophen (PERCOCET) 5-325 MG per tablet Take 1 tablet by mouth every 4 hours as needed for Pain.  8/10/21  Yes Historical Provider, MD   cyclobenzaprine (FLEXERIL) 10 MG tablet Take 1 tablet by mouth 3 times daily as needed for Muscle spasms 8/10/21  Yes Martine Domingo Black, DO   cefTRIAXone (ROCEPHIN) 500 MG injection Infuse 2,000 mg intravenously every 24 hours 8/9/21 9/25/21 Yes Magimo Oneill, DO   atorvastatin (LIPITOR) 20 MG tablet TAKE 1 TABLET DAILY 1/28/21  Yes Pettit Core Black, DO   buPROPion (WELLBUTRIN XL) 300 MG extended release tablet TAKE 1 TABLET EVERY MORNING 10/14/20  Yes Pettit Core Black, DO   losartan (COZAAR) 25 MG tablet TAKE 1 TABLET DAILY 10/14/20  Yes Salima Core Black, DO   atenolol (TENORMIN) 50 MG tablet TAKE 1 TABLET DAILY 10/14/20  Yes Salima Core Black, DO   furosemide (LASIX) 20 MG tablet TAKE 1 TABLET DAILY 6/6/18  Yes Salima Core Black, DO   STIMULANT LAXATIVE 8.6-50 MG per tablet Take 2 tablets by mouth nightly 8/3/21   Historical Provider, MD   PSYLLIUM PO Take by mouth    Historical Provider, MD   ciprofloxacin (CIPRO) 750 MG tablet Take 1 tablet by mouth every 12 hours  Patient not taking: Reported on 8/11/2021 8/3/21   Historical Provider, MD [place] MISC Use daily as needed. 9/3/20   Topher Del Castillo DO   Handicap Placard MISC by Does not apply route Issue parking placard for person with disability; Applicant meets the qualifying disability criteria. Length of time expected to have disability__X___Lifetime. Prescription expires in 5 years from issuing date. 9/4/19   Topher Del Castillo DO   Handicap Placard MISC by Does not apply route Issue parking placard for person with disability; Applicant meets the qualifying disability criteria. Length of time expected to have disability__X___Lifetime  Other ____. Prescription expires in 5 years from issuing date. 9/4/19   Topher Del Castillo DO        Allergies:     Benazepril, Dynabac [dirithromycin], Levothyroxine, Lisinopril, and Other    Social History:     Tobacco:    reports that she has been smoking cigarettes. She started smoking about 7 years ago. She has a 10.00 pack-year smoking history. She has never used smokeless tobacco.  Alcohol:      reports current alcohol use.   Drug Use:  has no history on file for drug use.    Family History:     Family History   Problem Relation Age of Onset    Heart Disease Father     Heart Disease Brother         MI    Cancer Brother         stomach or prostate    Heart Disease Brother         CABG in his late 40's/early 52's    High Blood Pressure Mother     Cancer Maternal Grandmother         breast    High Blood Pressure Sister     Diabetes Paternal Grandmother     Lupus Other         Niece       Review of Systems:     Positive and Negative as described in HPI. CONSTITUTIONAL:  negative for fevers, + chills, sweats, fatigue,  HEENT:  negative for vision, hearing changes, runny nose, throat pain  RESPIRATORY:  negative for shortness of breath, cough, congestion, wheezing  CARDIOVASCULAR:  negative for chest pain, palpitations  GASTROINTESTINAL:  negative for, diarrhea, constipation, change in bowel habits, abdominal pain, +  nausea, vomiting  GENITOURINARY:  negative for difficulty of urination, burning with urination, frequency   INTEGUMENT:  negative for rash, skin lesions, easy bruising   HEMATOLOGIC/LYMPHATIC:  negative for swelling/edema   ALLERGIC/IMMUNOLOGIC:  negative for urticaria , itching  ENDOCRINE:  negative increase in drinking, increase in urination, hot or cold intolerance  MUSCULOSKELETAL:  negative joint pains, + muscle aches- back pain  NEUROLOGICAL:  negative for headaches, dizziness, lightheadedness, numbness, pain, tingling extremities  BEHAVIOR/PSYCH:  Positive for depression, anxiety    Physical Exam:   /76   Pulse 102   Temp 98.4 °F (36.9 °C) (Oral)   Resp 16   Ht 5' 2\" (1.575 m)   Wt 270 lb (122.5 kg)   SpO2 97%   BMI 49.38 kg/m²   Temp (24hrs), Av.6 °F (37 °C), Min:98.4 °F (36.9 °C), Max:98.7 °F (37.1 °C)    No results for input(s): POCGLU in the last 72 hours.   No intake or output data in the 24 hours ending 21 1155    General Appearance: alert, ill appearing, and in no acute distress  Mental status: oriented to person, place, and 0 TO 4 0 - 4 /HPF    Casts UA NOT REPORTED 0 - 2 /LPF    Crystals, UA NOT REPORTED None /HPF    Epithelial Cells UA 0 TO 4 0 - 5 /HPF    Renal Epithelial, UA NOT REPORTED 0 /HPF    Bacteria, UA TRACE (A) None    Mucus, UA NOT REPORTED None    Trichomonas, UA NOT REPORTED None    Amorphous, UA NOT REPORTED None    Other Observations UA NOT REPORTED NOT REQ. Yeast, UA NOT REPORTED None   COMPREHENSIVE METABOLIC PANEL    Collection Time: 08/18/21 10:17 AM   Result Value Ref Range    Glucose 111 (H) 70 - 99 mg/dL    BUN 7 (L) 8 - 23 mg/dL    CREATININE 0.52 0.50 - 0.90 mg/dL    Bun/Cre Ratio NOT REPORTED 9 - 20    Calcium 8.9 8.6 - 10.4 mg/dL    Sodium 130 (L) 135 - 144 mmol/L    Potassium 3.6 (L) 3.7 - 5.3 mmol/L    Chloride 101 98 - 107 mmol/L    CO2 15 (L) 20 - 31 mmol/L    Anion Gap 14 9 - 17 mmol/L    Alkaline Phosphatase 98 35 - 104 U/L    ALT 23 5 - 33 U/L    AST 29 <32 U/L    Total Bilirubin 0.48 0.3 - 1.2 mg/dL    Total Protein 6.5 6.4 - 8.3 g/dL    Albumin 2.7 (L) 3.5 - 5.2 g/dL    Albumin/Globulin Ratio 0.7 (L) 1.0 - 2.5    GFR Non-African American >60 >60 mL/min    GFR African American >60 >60 mL/min    GFR Comment          GFR Staging NOT REPORTED    CBC    Collection Time: 08/18/21 10:17 AM   Result Value Ref Range    WBC 6.4 3.5 - 11.3 k/uL    RBC 4.37 3.95 - 5.11 m/uL    Hemoglobin 12.4 11.9 - 15.1 g/dL    Hematocrit 42.2 36.3 - 47.1 %    MCV 96.6 82.6 - 102.9 fL    MCH 28.4 25.2 - 33.5 pg    MCHC 29.4 28.4 - 34.8 g/dL    RDW 13.4 11.8 - 14.4 %    Platelets 459 032 - 092 k/uL    MPV 10.7 8.1 - 13.5 fL    NRBC Automated 0.0 0.0 per 100 WBC       Imaging/Diagnostics:  CT LUMBAR SPINE W CONTRAST    Result Date: 8/17/2021  Findings compatible with mild acute osteomyelitis at L3-L4. Mild central canal stenosis is suspected. 2.6 x 2.0 cm left psoas abscess located at L4-L5. CT CHEST PULMONARY EMBOLISM W CONTRAST    Result Date: 8/17/2021  1.   No evidence of pulmonary embolism or acute pulmonary abnormality. 2.  Sequela of old granulomatous disease with stable 7 mm pleural based nodule in the posterior right upper lobe, for which no further evaluation is necessary. RECOMMENDATIONS:       Assessment :      Hospital Problems         Last Modified POA    * (Principal) Psoas abscess ([facility] [address] 8/18/2021 Yes    Hypertension 8/18/2021 Yes    GERD (gastroesophageal reflux disease) 8/18/2021 Yes    Morbid obesity ([address] 8/18/2021 Yes    Depression 8/18/2021 Yes    Chronic back pain 8/18/2021 Yes    Hyponatremia 8/18/2021 Yes    Lactic acid increased 8/18/2021 Yes    Anxiety 8/18/2021 Yes    Osteomyelitis of lumbar spine ([facility] [address] 8/18/2021 Yes          Plan:     Patient status inpatient in the Med/Surge    1. Left psoas muscle abscess- Changed IV Rocephin to IV Cefepime, con't Vanco.  Picc line initially placed on 8/9/2021. IR consulted to drain. F/u blood cultures, send drainage culture if possible. ID consulted  2. L3-L4 osteomyelitis- on IV Rocephin from 8/9/2021, Percocet prn pain  3. Hyponatremia- from dehydration, repeat lytes, IVF  4. N/v- IV Zofran prn, IVF  5. Anxiety/ Depression- start Ativan 1mg po q 6hr prn, pt crying, Wellbutrin  6. Lacid acidosis- repeat lactic acid, BP stable  7. HTN- monitor BP  8. Gerd- start Pepcid  9. DVT proph  10. NPO with sips water with meds  11. PT/OT    dw nurse    Consultations:   IP CONSULT TO PHARMACY  IP CONSULT TO INFECTIOUS DISEASES  IP CONSULT TO INTERVENTIONAL RADIOLOGY     Patient is admitted as inpatient status because of co-morbidities listed above, severity of signs and symptoms as outlined, requirement for current medical therapies and most importantly because of direct risk to patient if care not provided in a hospital setting. Expected length of stay > 48 hours.     Yobany Doty MD  8/18/2021  11:55 AM    Copy sent to Dr. Heaven Ann DO
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
## 2021-08-18 NOTE — ED NOTES
Patient transferred into a hospital bed from ED stretcher, pericare performed and primafit catheter replaced. Writer performed mayelin care and placed foam dressing over skin breakdown on patient's right sacral area. Patient has no further needs at this time. All patient belongings and call light are within reach. Patient's  is at bedside.      Ramon Lee RN  08/17/21 2411

## 2021-08-18 NOTE — BRIEF OP NOTE
Brief Postoperative Note    Lala Judd  YOB: 1955  [id number]    Pre-operative Diagnosis: Psoas Abscess      Post-operative Diagnosis: Same    Procedure: Abscess drain placement    Medication Given: fentanyl and versed    Anesthesia: 1%Lidocaine     Surgeons/Assistants:  Carlie Cole MD and Tonio Tinoco PA-C    Estimated Blood Loss: Minimal    Complications: none    Specimen: Was collected    Procedure:  Successful aspiration of left psoas fluid collection. Approximately 3 mL of red fluid was obtained. Pt tolerated procedure well and left the department in stable condition.       Electronically signed by ALEC Hung on 8/18/2021 at 4:06 PM

## 2021-08-19 VITALS
HEIGHT: 62 IN | HEART RATE: 90 BPM | SYSTOLIC BLOOD PRESSURE: 109 MMHG | RESPIRATION RATE: 18 BRPM | WEIGHT: 270 LBS | OXYGEN SATURATION: 93 % | BODY MASS INDEX: 49.69 KG/M2 | TEMPERATURE: 98.5 F | DIASTOLIC BLOOD PRESSURE: 54 MMHG

## 2021-08-19 PROBLEM — I50.32 CHRONIC DIASTOLIC HEART FAILURE (HCC): Status: ACTIVE | Noted: 2021-08-19

## 2021-08-19 LAB
ALBUMIN SERPL-MCNC: 2.7 G/DL (ref 3.5–5.2)
ALBUMIN/GLOBULIN RATIO: 0.9 (ref 1–2.5)
ALP BLD-CCNC: 82 U/L (ref 35–104)
ALT SERPL-CCNC: 23 U/L (ref 5–33)
ANION GAP SERPL CALCULATED.3IONS-SCNC: 12 MMOL/L (ref 9–17)
AST SERPL-CCNC: 30 U/L
BILIRUB SERPL-MCNC: 0.3 MG/DL (ref 0.3–1.2)
BUN BLDV-MCNC: 10 MG/DL (ref 8–23)
BUN/CREAT BLD: ABNORMAL (ref 9–20)
CALCIUM SERPL-MCNC: 8.3 MG/DL (ref 8.6–10.4)
CHLORIDE BLD-SCNC: 100 MMOL/L (ref 98–107)
CO2: 19 MMOL/L (ref 20–31)
CREAT SERPL-MCNC: 0.66 MG/DL (ref 0.5–0.9)
GFR AFRICAN AMERICAN: >60 ML/MIN
GFR NON-AFRICAN AMERICAN: >60 ML/MIN
GFR SERPL CREATININE-BSD FRML MDRD: ABNORMAL ML/MIN/{1.73_M2}
GFR SERPL CREATININE-BSD FRML MDRD: ABNORMAL ML/MIN/{1.73_M2}
GLUCOSE BLD-MCNC: 94 MG/DL (ref 70–99)
HCT VFR BLD CALC: 36.3 % (ref 36.3–47.1)
HEMOGLOBIN: 11 G/DL (ref 11.9–15.1)
INR BLD: 1.3
MCH RBC QN AUTO: 28.2 PG (ref 25.2–33.5)
MCHC RBC AUTO-ENTMCNC: 30.3 G/DL (ref 28.4–34.8)
MCV RBC AUTO: 93.1 FL (ref 82.6–102.9)
NRBC AUTOMATED: 0 PER 100 WBC
PDW BLD-RTO: 13.6 % (ref 11.8–14.4)
PLATELET # BLD: 267 K/UL (ref 138–453)
PMV BLD AUTO: 11 FL (ref 8.1–13.5)
POTASSIUM SERPL-SCNC: 3.8 MMOL/L (ref 3.7–5.3)
PROTHROMBIN TIME: 13.9 SEC (ref 9.1–12.3)
RBC # BLD: 3.9 M/UL (ref 3.95–5.11)
SODIUM BLD-SCNC: 131 MMOL/L (ref 135–144)
TOTAL PROTEIN: 5.7 G/DL (ref 6.4–8.3)
WBC # BLD: 5.4 K/UL (ref 3.5–11.3)

## 2021-08-19 PROCEDURE — 36415 COLL VENOUS BLD VENIPUNCTURE: CPT

## 2021-08-19 PROCEDURE — 85027 COMPLETE CBC AUTOMATED: CPT

## 2021-08-19 PROCEDURE — 99239 HOSP IP/OBS DSCHRG MGMT >30: CPT | Performed by: STUDENT IN AN ORGANIZED HEALTH CARE EDUCATION/TRAINING PROGRAM

## 2021-08-19 PROCEDURE — 6370000000 HC RX 637 (ALT 250 FOR IP): Performed by: INTERNAL MEDICINE

## 2021-08-19 PROCEDURE — 97530 THERAPEUTIC ACTIVITIES: CPT

## 2021-08-19 PROCEDURE — 2580000003 HC RX 258: Performed by: CLINICAL NURSE SPECIALIST

## 2021-08-19 PROCEDURE — 97166 OT EVAL MOD COMPLEX 45 MIN: CPT

## 2021-08-19 PROCEDURE — 6360000002 HC RX W HCPCS: Performed by: CLINICAL NURSE SPECIALIST

## 2021-08-19 PROCEDURE — 6370000000 HC RX 637 (ALT 250 FOR IP): Performed by: CLINICAL NURSE SPECIALIST

## 2021-08-19 PROCEDURE — 97162 PT EVAL MOD COMPLEX 30 MIN: CPT

## 2021-08-19 PROCEDURE — 94760 N-INVAS EAR/PLS OXIMETRY 1: CPT

## 2021-08-19 PROCEDURE — 97535 SELF CARE MNGMENT TRAINING: CPT

## 2021-08-19 PROCEDURE — 99232 SBSQ HOSP IP/OBS MODERATE 35: CPT | Performed by: INTERNAL MEDICINE

## 2021-08-19 PROCEDURE — 80053 COMPREHEN METABOLIC PANEL: CPT

## 2021-08-19 PROCEDURE — 2580000003 HC RX 258: Performed by: INTERNAL MEDICINE

## 2021-08-19 PROCEDURE — 85610 PROTHROMBIN TIME: CPT

## 2021-08-19 RX ORDER — OXYCODONE HYDROCHLORIDE AND ACETAMINOPHEN 5; 325 MG/1; MG/1
1 TABLET ORAL EVERY 6 HOURS PRN
Qty: 20 TABLET | Refills: 0 | Status: SHIPPED | OUTPATIENT
Start: 2021-08-19 | End: 2021-08-26

## 2021-08-19 RX ADMIN — LEVOTHYROXINE SODIUM 25 MCG: 25 TABLET ORAL at 06:55

## 2021-08-19 RX ADMIN — ACETAMINOPHEN 650 MG: 325 TABLET ORAL at 00:44

## 2021-08-19 RX ADMIN — VANCOMYCIN HYDROCHLORIDE 1250 MG: 10 INJECTION, POWDER, LYOPHILIZED, FOR SOLUTION INTRAVENOUS at 02:11

## 2021-08-19 RX ADMIN — ENOXAPARIN SODIUM 40 MG: 40 INJECTION SUBCUTANEOUS at 08:14

## 2021-08-19 RX ADMIN — OXYCODONE HYDROCHLORIDE AND ACETAMINOPHEN 1 TABLET: 5; 325 TABLET ORAL at 17:55

## 2021-08-19 RX ADMIN — OXYCODONE HYDROCHLORIDE AND ACETAMINOPHEN 1 TABLET: 5; 325 TABLET ORAL at 08:15

## 2021-08-19 RX ADMIN — FAMOTIDINE 20 MG: 20 TABLET, FILM COATED ORAL at 08:15

## 2021-08-19 RX ADMIN — OXYCODONE HYDROCHLORIDE AND ACETAMINOPHEN 1 TABLET: 5; 325 TABLET ORAL at 13:53

## 2021-08-19 RX ADMIN — ATORVASTATIN CALCIUM 20 MG: 20 TABLET, FILM COATED ORAL at 08:15

## 2021-08-19 RX ADMIN — SODIUM CHLORIDE: 9 INJECTION, SOLUTION INTRAVENOUS at 04:51

## 2021-08-19 RX ADMIN — ATENOLOL 50 MG: 50 TABLET ORAL at 08:15

## 2021-08-19 RX ADMIN — LOSARTAN POTASSIUM 25 MG: 25 TABLET, FILM COATED ORAL at 08:15

## 2021-08-19 RX ADMIN — SODIUM CHLORIDE, PRESERVATIVE FREE 10 ML: 5 INJECTION INTRAVENOUS at 09:35

## 2021-08-19 RX ADMIN — BUPROPION HYDROCHLORIDE 300 MG: 150 TABLET, EXTENDED RELEASE ORAL at 08:15

## 2021-08-19 ASSESSMENT — ENCOUNTER SYMPTOMS
COUGH: 0
NAUSEA: 0
VOMITING: 0
DIARRHEA: 0
EYES NEGATIVE: 1
ABDOMINAL PAIN: 0
SORE THROAT: 0
RHINORRHEA: 0
SHORTNESS OF BREATH: 0
BACK PAIN: 1

## 2021-08-19 ASSESSMENT — PAIN DESCRIPTION - DESCRIPTORS
DESCRIPTORS: ACHING;SPASM
DESCRIPTORS: DISCOMFORT

## 2021-08-19 ASSESSMENT — PAIN DESCRIPTION - ORIENTATION: ORIENTATION: LOWER

## 2021-08-19 ASSESSMENT — PAIN DESCRIPTION - PAIN TYPE
TYPE: CHRONIC PAIN
TYPE: CHRONIC PAIN

## 2021-08-19 ASSESSMENT — PAIN SCALES - GENERAL
PAINLEVEL_OUTOF10: 8
PAINLEVEL_OUTOF10: 9
PAINLEVEL_OUTOF10: 0
PAINLEVEL_OUTOF10: 7
PAINLEVEL_OUTOF10: 9
PAINLEVEL_OUTOF10: 8
PAINLEVEL_OUTOF10: 5

## 2021-08-19 ASSESSMENT — PAIN DESCRIPTION - LOCATION
LOCATION: BACK
LOCATION: BACK

## 2021-08-19 ASSESSMENT — PAIN - FUNCTIONAL ASSESSMENT: PAIN_FUNCTIONAL_ASSESSMENT: PREVENTS OR INTERFERES SOME ACTIVE ACTIVITIES AND ADLS

## 2021-08-19 NOTE — PROGRESS NOTES
Patient discharged home with home health.  picc line dressing changed and is c/d/i. Medication delivered to home last night and spouse is educated regarding infusing antibiotics. [facility] nurse to check in once weekly to assess and evaluate. PIV d/c'd and dressed with dry dressing and tape with no complications. Discharge instructions, follow up appointments and medications all reviewed with patient. All questions asked and answered. 1830: pt called from road to inquire about percocet. She did not receive script. Physician was messaged and she agreed to call script in to [facility] in [facility] in the morning.

## 2021-08-19 NOTE — DISCHARGE SUMMARY
[facility]  Office: [address], DO, Dorawade Mercedes, DO, Larry Gutierrez, DO, Zahida Moses, DO, Valiant Sicard, MD, Kain Silva MD, Lincoln Wasserman MD, Mikal Infante MD, Rico Valdivia MD, Helio Esquivel MD, Ashish Martínez MD, Petey Serrano, DO, Britton Smith MD, Alondra Nicholson DO, Bere Eli MD,  William Irizarry DO, Bess Vallecillo MD, Chelsi Alarcon MD, Stanislaw Peñaloza MD, Sid Hernandez MD, Winnie Lauren MD, Austyn Elliott MD, Ralph Mireles MD, Roman Hernández, [facility], [facility], [facility], Cristina Angulo, [facility], Ashlyn Ponce, CNS, Brooklyn Luke, CNP, Maryjane Babcock, CNP, Ana Pope, CNP, Percy Andino, CNP, Melecio Diaz, CNP, Raúl Rhodes PA-C, William Zhao, [facility], Chele Montaño, CNP, Justus Cote, CNP, Nayeli Miller, CNP, Josep Wasserman, CNP, Ana Olvera, CNP, Claudia Rodriguez, CNP, Caio Li, [facility], [facility], [address] [place] [address]    Discharge Summary     Patient ID: Lidia Waite  :  1955   MRN: [id number]     ACCOUNT:  [de-identified]   Patient's PCP: Cesia Vasquez DO  Admit Date: 2021   Discharge Date: 2021   Length of Stay: 1  Code Status:  Prior  Admitting Physician: Stanislaw Peñaloza MD  Discharge Physician: Stanislaw Peñaloza MD     Active Discharge Diagnoses:     Hospital Problem Lists:  Principal Problem:    Psoas abscess [facility])  Active Problems:    Hypertension    GERD (gastroesophageal reflux disease)    Morbid obesity ([facility]    Depression    Chronic back pain    Hyponatremia    Lactic acid increased    Anxiety    Osteomyelitis of lumbar spine (HCC)    Chronic diastolic heart failure ([facility]  Resolved Problems:    * No resolved hospital problems. *      Admission Condition:  fair     Discharged Condition: good    Hospital Stay:     Hospital Course:  Lidia Waite is a 78 y.o. female who was admitted for the management of   Psoas abscess ([facility] , presented to ER with back pain.   Patient had known history of osteomyelitis, E. coli sepsis, on Rocephin at home, patient presented to the hospital with not feeling well, nausea, vomiting. Patient initially went to [facility] ER, seen her psoas muscle abscess at L4-L5 on CT scan, transferred to [place], underwent IR guided drainage of abscess with 3 mm removal on 8/18. ID was consulted, ID recommended continuing Rocephin as likely psoas abscess was due to E. coli. Repeat blood cultures were negative. Plan to discharge on IV Rocephin for 6 weeks till 9/25. Follow-up with PCP and infectious disease. Patient was stable for discharge.     Significant therapeutic interventions: as above    Significant Diagnostic Studies:   Labs / Micro:  CBC:   Lab Results   Component Value Date    WBC 5.4 08/19/2021    RBC 3.90 08/19/2021    HGB 11.0 08/19/2021    HCT 36.3 08/19/2021    MCV 93.1 08/19/2021    MCH 28.2 08/19/2021    MCHC 30.3 08/19/2021    RDW 13.6 08/19/2021     08/19/2021     BMP:    Lab Results   Component Value Date    GLUCOSE 94 08/19/2021     08/19/2021    K 3.8 08/19/2021     08/19/2021    CO2 19 08/19/2021    ANIONGAP 12 08/19/2021    BUN 10 08/19/2021    CREATININE 0.66 08/19/2021    BUNCRER NOT REPORTED 08/19/2021    CALCIUM 8.3 08/19/2021    LABGLOM >60 08/19/2021    GFRAA >60 08/19/2021    GFR      08/19/2021    GFR NOT REPORTED 08/19/2021     HFP:    Lab Results   Component Value Date    PROT 5.7 08/19/2021     CMP:    Lab Results   Component Value Date    GLUCOSE 94 08/19/2021     08/19/2021    K 3.8 08/19/2021     08/19/2021    CO2 19 08/19/2021    BUN 10 08/19/2021    CREATININE 0.66 08/19/2021    ANIONGAP 12 08/19/2021    ALKPHOS 82 08/19/2021    ALT 23 08/19/2021    AST 30 08/19/2021    BILITOT 0.30 08/19/2021    LABALBU 2.7 08/19/2021    ALBUMIN 0.9 08/19/2021    LABGLOM >60 08/19/2021    GFRAA >60 08/19/2021    GFR      08/19/2021    GFR NOT REPORTED 08/19/2021    PROT 5.7 08/19/2021    CALCIUM 8.3 08/19/2021        Radiology:  CT LUMBAR SPINE W CONTRAST    Result Date: 8/17/2021  Findings compatible with mild acute osteomyelitis at L3-L4. Mild central canal stenosis is suspected. 2.6 x 2.0 cm left psoas abscess located at L4-L5. CT CHEST PULMONARY EMBOLISM W CONTRAST    Result Date: 8/17/2021  1. No evidence of pulmonary embolism or acute pulmonary abnormality. 2.  Sequela of old granulomatous disease with stable 7 mm pleural based nodule in the posterior right upper lobe, for which no further evaluation is necessary. RECOMMENDATIONS:     CT ABSCESS DRAINAGE    Result Date: 8/19/2021  Successful CT guided fluid collection aspiration. No drain left behind. Consultations:    Consults:     Final Specialist Recommendations/Findings:   IP CONSULT TO INFECTIOUS DISEASES  IP CONSULT TO HOME CARE NEEDS      The patient was seen and examined on day of discharge and this discharge summary is in conjunction with any daily progress note from day of discharge. Discharge plan:     Disposition: Home    Physician Follow Up:     Marj Warren DO  [facility]  [phone]    In 3 days      Sha Coyle MD  [address] [place] [address]  [phone]    In 4 weeks  post hospital visit, psoas abscess       Requiring Further Evaluation/Follow Up POST HOSPITALIZATION/Incidental Findings: follow up     Diet: regular diet    Activity: As tolerated    Instructions to Patient: continue rocephin till 9/25    Discharge Medications:      Medication List        CHANGE how you take these medications      * oxyCODONE-acetaminophen 5-325 MG per tablet  Commonly known as: PERCOCET  What changed: Another medication with the same name was added. Make sure you understand how and when to take each. * oxyCODONE-acetaminophen 5-325 MG per tablet  Commonly known as: Percocet  Take 1 tablet by mouth every 6 hours as needed for Pain for up to 7 days. Intended supply: 7 days. carefully, and ask your doctor or other care provider to review them with you. STOP taking these medications      ciprofloxacin 750 MG tablet  Commonly known as: CIPRO               Where to Get Your Medications        These medications were sent to [address] [place] [address] Rafael Wong, [address] [address]      Phone: [phone]   oxyCODONE-acetaminophen 5-325 MG per tablet         No discharge procedures on file. Time Spent on discharge is  41 mins in patient examination, evaluation, counseling as well as medication reconciliation, prescriptions for required medications, discharge plan and follow up. Electronically signed by   Ann Jarvis MD  8/20/2021  5:07 PM      Thank you Dr. Maxime Doyle DO for the opportunity to be involved in this patient's care.

## 2021-08-19 NOTE — PROGRESS NOTES
Physical Therapy    [place] [facility] [place]  Initial Assessment    NAME: Shantanu Foreman  : 1955  MRN: [id number]    Date of Service: 2021  \"Pt is a 78 yr old  female with HTN, MO, anxiety/ depression who presented to [facility] with 1-2 days of intractable Nausea/ vomiting. She has felt warm, but no fever or chills. She drove to [place] with her  on 2021. She was experiencing some low back pain before the drive, and during the trip. \"    Discharge Recommendations:    Further therapy recommended at discharge. PT Equipment Recommendations  Equipment Needed: No (pt reports owning canes, walkers, and power chair)    Assessment   Body structures, Functions, Activity limitations: Decreased functional mobility ; Decreased posture;Decreased endurance;Decreased balance;Decreased strength; Increased pain  Assessment: Pt performed CGA to perform stand pivot transfer to bedside chair, pt's tolerance to functional mobility limited due to pain. Pt would benefit from continued skilled physical therapy to address pain and tolerance to functional mobility to return pt to baseline functional mobility. Prognosis: Fair  Decision Making: Medium Complexity  PT Education: Goals;PT Role;Plan of Care  REQUIRES PT FOLLOW UP: Yes  Activity Tolerance  Activity Tolerance: Patient limited by pain       Patient Diagnosis(es): There were no encounter diagnoses. has a past medical history of Chronic back pain, Depression, GERD (gastroesophageal reflux disease), Hyperlipidemia, Hypertension, and Morbid obesity ([facility] [address] has a past surgical history that includes Colonoscopy (); Cystoscopy (); Hysterectomy (); Hemorrhoid surgery; other surgical history; Appendectomy (); and CT ABSCESS DRAINAGE (2021).     Restrictions  Restrictions/Precautions  Restrictions/Precautions: Fall Risk  Required Braces or Orthoses?: No  Position Activity Restriction  Other position/activity restrictions:  Up with assistance  Vision/Hearing  Vision: Impaired  Vision Exceptions: Wears glasses for reading  Hearing: Within functional limits     Subjective  General  Patient assessed for rehabilitation services?: Yes  Response To Previous Treatment: Not applicable  Family / Caregiver Present: No  Follows Commands: Within Functional Limits  Subjective  Subjective: RN and pt in agreement for PT eval; pt supine in bed upon PT arrival, pt pleasant and cooperative throughout session  Pain Screening  Patient Currently in Pain: Yes  Pain Assessment  Pain Assessment: 0-10  Pain Level: 9  Pain Type: Chronic pain  Pain Location: Back  Pain Descriptors: Discomfort  Non-Pharmaceutical Pain Intervention(s): Ambulation/Increased Activity;Repositioned  Response to Pain Intervention: Patient Satisfied  Multiple Pain Sites: No  Vital Signs  Patient Currently in Pain: Yes       Orientation  Orientation  Overall Orientation Status: Within Functional Limits  Social/Functional History  Social/Functional History  Lives With: Spouse  Type of Home: House  Home Layout: One level  Home Access: Stairs to enter with rails  Entrance Stairs - Number of Steps: 5 steps  Entrance Stairs - Rails: Left  Bathroom Shower/Tub: Walk-in shower  Bathroom Toilet: Standard  Bathroom Equipment: Grab bars in shower, Built-in shower seat  Bathroom Accessibility: Accessible  Home Equipment: Cane, Rolling walker, Electric scooter  Receives Help From: Family (Pt reports supportive family; pt states  is retired and available to assist pt at all times)  ADL Assistance: Independent (per pt report)  Homemaking Assistance: Needs assistance  Homemaking Responsibilities: Yes (pt reports IADL tasks able to be shared with or completed by family)  Meal Prep Responsibility: Primary  Laundry Responsibility: Secondary  Cleaning Responsibility: Secondary  Shopping Responsibility: Secondary  Health Care Management: Primary  Ambulation Assistance: Independent (pt reports mostly furniture/wall walking for household distances and use of electric scooter for community distances)  Transfer Assistance: Independent  Active : No  Mode of Transportation: Car, Family, SUV  Occupation: Retired  Cognition   Cognition  Overall Cognitive Status: WFL    Objective    Joint Mobility  ROM RLE: WFL  ROM LLE: WFL  ROM RUE: WFL  ROM LUE: WFL  Strength RLE  Strength RLE: WFL  Strength LLE  Strength LLE: WFL  Strength RUE  Strength RUE: WFL  Strength LUE  Strength LUE: WFL     Sensation  Overall Sensation Status: WFL (Pt denies any numbness/tingling)  Bed mobility  Supine to Sit: Contact guard assistance (HOB raised 60* and use of bed rail)  Scooting: Stand by assistance  Comment: Increased time required to complete  Transfers  Stand Pivot Transfers: Contact guard assistance  Comment: Significantly forward flexed posture with high UE reliance on bilateral arm rests of chair noted with functional transfer. Pt reports this is baseline.   Ambulation  Ambulation?: No (unable to ambulate due to pain)  Stairs/Curb  Stairs?: No     Balance  Posture: Poor  Sitting - Static: Good  Sitting - Dynamic: Good;-  Standing - Static: Good;-  Standing - Dynamic: Fair;+  Comments: Standing balance assessed w/ RW; pt able to sit EOB SBA        Plan   Plan  Times per week: 5-6x/week  Current Treatment Recommendations: Strengthening, Transfer Training, Endurance Training, Patient/Caregiver Education & Training, ROM, Equipment Evaluation, Education, & procurement, Balance Training, Gait Training, Home Exercise Program, Functional Mobility Training, Stair training, Safety Education & Training  Safety Devices  Type of devices: Left in chair, Call light within reach, Nurse notified  Restraints  Initially in place: No    AM-PAC Score     AM-PAC Inpatient Mobility without Stair Climbing Raw Score : 17 (08/19/21 1504)  AM-PAC Inpatient without Stair Climbing T-Scale Score : 48.47 (08/19/21 1504)  Mobility Inpatient CMS 0-100% Score: 32.72 (08/19/21 1504)  Mobility Inpatient without Stair CMS G-Code Modifier : CJ (08/19/21 1504)       Goals  Short term goals  Time Frame for Short term goals: 14  Short term goal 1: Pt to perform bed mobility from flat surface independently  Short term goal 2: Pt to demonstrate functional transfers independnetly  Short term goal 3: Pt to ambulate 50ft w/ least restictive AD independently  Short term goal 4: Ascend/descend 5 stairs L rail with supervision to simulate home environment  Patient Goals   Patient goals :  To go home       Therapy Time   Individual Concurrent Group Co-treatment   Time In 1057         Time Out 1124         Minutes 27         Timed Code Treatment Minutes: 9 Minutes       Johanny Chung, PT

## 2021-08-19 NOTE — PROGRESS NOTES
Occupational Therapy   Occupational Therapy Initial Assessment  Date: 2021   Patient Name: Karlee Javier  MRN: [id number]     : 1955    Date of Service: 2021    Chief Complaint: Back pain; L psoas abscess and Osteomyelitis of L3-L4    Discharge Recommendations:  Patient would benefit from continued therapy after discharge       Assessment   Performance deficits / Impairments: Decreased functional mobility ; Decreased ADL status; Decreased endurance;Decreased safe awareness;Decreased balance;Decreased high-level IADLs  Prognosis: Good  Decision Making: Medium Complexity  OT Education: OT Role;Plan of Care;Transfer Training;Energy Conservation (Activity Promotion, Repositioning for Pain Relief, Safety with Transfers; good return)  REQUIRES OT FOLLOW UP: Yes  Activity Tolerance  Activity Tolerance: Patient limited by pain  Safety Devices  Safety Devices in place: Yes  Type of devices: Call light within reach; Left in chair;Nurse notified  Restraints  Initially in place: No           Patient Diagnosis(es): There were no encounter diagnoses. has a past medical history of Chronic back pain, Depression, GERD (gastroesophageal reflux disease), Hyperlipidemia, Hypertension, and Morbid obesity ([facility] [address] has a past surgical history that includes Colonoscopy (); Cystoscopy (); Hysterectomy (); Hemorrhoid surgery; other surgical history; Appendectomy (); and CT ABSCESS DRAINAGE (2021). Restrictions  Restrictions/Precautions  Restrictions/Precautions: Fall Risk  Required Braces or Orthoses?: No  Position Activity Restriction  Other position/activity restrictions: Up with assistance    Subjective   General  Patient assessed for rehabilitation services?: Yes  Family / Caregiver Present: Yes ()  General Comment  Comments: RN ok'd for therapy this AM. Pt agreeable to participate in session and pleasant/cooperative throughout.  Pt hyper-verbal throughout session requiring cueing for redirection. Patient Currently in Pain: Yes  Pain Assessment  Pain Assessment: 0-10  Pain Level: 9  Pain Type: Chronic pain  Pain Location: Back  Pain Orientation: Lower  Pain Descriptors: Aching;Spasm  Functional Pain Assessment: Prevents or interferes some active activities and ADLs  Non-Pharmaceutical Pain Intervention(s): Ambulation/Increased Activity; Distraction;Repositioned  Response to Pain Intervention: Patient Satisfied    Social/Functional History  Social/Functional History  Lives With: Spouse  Type of Home: House  Home Layout: One level  Home Access: Stairs to enter with rails  Entrance Stairs - Number of Steps: 5 steps  Entrance Stairs - Rails: Left  Bathroom Shower/Tub: Walk-in shower  Bathroom Toilet: Standard  Bathroom Equipment: Grab bars in shower, Built-in shower seat  Bathroom Accessibility: Accessible  Home Equipment: Cane, Rolling walker, Electric scooter  Receives Help From: Family (Pt reports supportive family; pt states  is retired and available to assist pt at all times)  ADL Assistance: Independent (per pt report)  Homemaking Assistance: Needs assistance  Homemaking Responsibilities: Yes (pt reports IADL tasks able to be shared with or completed by family)  Meal Prep Responsibility: Primary  Laundry Responsibility: Secondary  Cleaning Responsibility: Secondary  Shopping Responsibility: Secondary  Health Care Management: Primary  Ambulation Assistance: Independent (pt reports mostly furniture/wall walking for household distances and use of electric scooter for community distances)  Transfer Assistance: Independent  Active : No  Mode of Transportation: Car, Family, SUV  Occupation: Retired       Objective   Vision: Impaired  Vision Exceptions: Wears glasses for reading  Hearing: Within functional limits    Orientation  Overall Orientation Status: Within Functional Limits        Balance  Sitting Balance: Stand by assistance (seated EOB ~12 minutes)  Standing Balance: Contact guard assistance (~45 seconds during static standing and stand pivot transfer)   Comments: Pt with complaints of intermittent back spasms throughout sitting/standing balance; pt mildly unsteady and impulsive requiring min VCs for safety awareness; no further mobility attempted this date due to pt's high pain levels and poor standing tolerance    ADL  Feeding: Independent  Grooming: Increased time to complete;Contact guard assistance  UE Bathing: Increased time to complete;Minimal assistance  LE Bathing: Increased time to complete; Moderate assistance  UE Dressing: Increased time to complete;Minimal assistance  LE Dressing: Increased time to complete; Moderate assistance (seated EOB to don socks)  Toileting: Increased time to complete; Moderate assistance     Tone RUE  RUE Tone: Normotonic  Tone LUE  LUE Tone: Normotonic  Coordination  Movements Are Fluid And Coordinated: Yes        Bed mobility  Supine to Sit: Contact guard assistance (HOB raised 60* and use of bed rail)  Sit to Supine:  (pt retired up to chair)  Scooting: Stand by assistance  Comment: Pt required significant increased time/effort to perform     Transfers  Stand Pivot Transfers: Contact guard assistance (significant forward trunk flexion with high BUE reliance on arms of recliner chair for support)  Sit to stand: Contact guard assistance  Stand to sit: Contact guard assistance  Transfer Comments: Min VCs for safety awareness as pt mildly impulsive; increased time/effort to perform        Cognition  Overall Cognitive Status: WFL           Sensation  Overall Sensation Status: WFL (Pt denies any numbness/tingling)        LUE AROM (degrees)  LUE AROM : WFL  RUE AROM (degrees)  RUE AROM : WFL  LUE Strength  Gross LUE Strength: WFL  RUE Strength  Gross RUE Strength: WFL         Plan   Plan  Times per week: 3-4x/wk  Current Treatment Recommendations: Strengthening, Balance Training, Functional Mobility Training, Endurance Training, Patient/Caregiver Education & Training, Equipment Evaluation, Education, & procurement, Self-Care / ADL, Home Management Training, Safety Education & Training    AM-PAC Score  AM-PAC Inpatient Daily Activity Raw Score: 16 (08/19/21 1149)  AM-PAC Inpatient ADL T-Scale Score : 35.96 (08/19/21 1149)  ADL Inpatient CMS 0-100% Score: 53.32 (08/19/21 1149)  ADL Inpatient CMS G-Code Modifier : CK (08/19/21 1149)    Goals  Short term goals  Time Frame for Short term goals: Pt will, by discharge:  Short term goal 1: Perform ADL tasks with mod IND using AE/DME PRN  Short term goal 2: Perform functional transfers/functional mobility with mod IND using least restrictive AD  Short term goal 3:  Independently demo good safety awareness during engagement in all ADLs and functional transfers/functional mobility  Short term goal 4: Demo 5+ minutes standing tolerance with use of least restrictive AD for increased participation in ADLs       Therapy Time   Individual Concurrent Group Co-treatment   Time In 1056         Time Out 1124         Minutes 28         Timed Code Treatment Minutes: [address], OTR/L

## 2021-08-19 NOTE — PROGRESS NOTES
[facility]  Office: [address], DO, Chica Douglas, DO, Pamela Burnette, DO, Ehsan Moses, DO, Renee Almanza MD, Jamila Gill MD, Dipti Walsh MD, Krista Cornelius MD, Iris Carmona MD, Patsy Agustin MD, Tangela Garcia MD, Johan Washington, DO, Kip Fallon MD, Ryan Willoughby, DO, Linda Brar MD,  Ladarius Bynum, DO, Marcio Perea MD, Martha Andrew MD, Sophie Monreal MD, Belkys Mahajan MD, Wendy Amato MD, Cyndy Castleman, MD, Anna Miranda MD, Rufus Elliott, [facility], [facility], [facility], Angela Barber, CNP, Delgado Alvarado, CNS, Galen Butts, CNP, Braulio Avalos, CNP, Nidia Nash, CNP, Dionicia Frankel, [facility], Martha Valladares, CNP, Norma Paniagua PADinoraC, Az Watters, [facility], Ramona Baez, CNP, Adrián Matamoros, CNP, Rafael Wilson, CNP, Thomas Breaux, CNP, Genesis Grant, CNP, Jonathan Amaya, CNP, Zoran Donato, CNP, Anum Lou, [address] [place] [address]    Progress Note    8/19/2021    7:31 AM    Name:   Delfina Moya  MRN:     [id number]     Acct:      [de-identified]   Room:   [address] [place] [address] Day:  1  Admit Date:  8/18/2021  9:10 AM    PCP:   Brittanie Hicks DO  Code Status:  Full Code    Subjective:     C/C: Nausea and vomiting  Interval History Status: improved. Patient has been feeling much better after the drainage of abscess yesterday  Continues to have pain in the back more on movement. Is able to tolerate diet, no nausea or vomiting. Encouraged to work with physical therapy today. Brief History:     58-year-old female with known medical history of hypertension, anxiety, depression, obesity presented to the hospital after intractable nausea and vomiting, she drove to [place] with her  on 7/25, had low back pain before the trip, diagnosed with E. coli sepsis in [place], seen to have osteomyelitis L3-L4.   Patient was started on IV antibiotics per PICC line but patient wanted to return home, was discharged on oral antibiotics. In [place] as per PCP Dr. Saida Haddad patient had a PICC line placed in [place] with reinitiation of IV antibiotics. Patient was not feeling well and had nausea and vomiting for unclear reasons for 2 days, went to [facility] ER and was seen to have left psoas muscle abscess at L4-L5, transferred to [place].  Patient underwent IR guided drainage of abscess with 3 mL removal on 8/18. Review of Systems:     Constitutional:  negative for chills, fevers, sweats  Respiratory:  negative for cough, dyspnea on exertion, shortness of breath, wheezing  Cardiovascular:  negative for chest pain, chest pressure/discomfort, lower extremity edema, palpitations  Gastrointestinal:  negative for abdominal pain, constipation, diarrhea, nausea, vomiting  Neurological: Positive for back pain, more on left side, negative for dizziness, headache    Medications: Allergies: Allergies   Allergen Reactions    Benazepril Other (See Comments)     cough    Dynabac [Dirithromycin]     Levothyroxine Other (See Comments)     Caused leg swelling, headache, insomnia, nausea.     Lisinopril Other (See Comments)     Cough      Other      Generic Prozac       Current Meds:   Scheduled Meds:    atenolol  50 mg Oral Daily    atorvastatin  20 mg Oral Daily    buPROPion  300 mg Oral Daily    [Held by provider] furosemide  20 mg Oral Daily    levothyroxine  25 mcg Oral Daily    losartan  25 mg Oral Daily    sennosides-docusate sodium  2 tablet Oral Nightly    sodium chloride flush  5-40 mL Intravenous 2 times per day    enoxaparin  40 mg Subcutaneous Daily    vancomycin (VANCOCIN) intermittent dosing (placeholder)   Other RX Placeholder    cefepime  1,000 mg Intravenous Q12H    famotidine  20 mg Oral BID    vancomycin  1,250 mg Intravenous Q12H     Continuous Infusions:    sodium chloride      sodium chloride 75 mL/hr at 08/19/21 0451     PRN Meds: cyclobenzaprine, sodium chloride flush, sodium chloride, potassium chloride **OR** potassium alternative oral replacement **OR** potassium chloride, magnesium sulfate, ondansetron **OR** ondansetron, polyethylene glycol, acetaminophen **OR** acetaminophen, LORazepam, oxyCODONE-acetaminophen    Data:     Past Medical History:   has a past medical history of Chronic back pain, Depression, GERD (gastroesophageal reflux disease), Hyperlipidemia, Hypertension, and Morbid obesity ([address] Social History:   reports that she has been smoking cigarettes. She started smoking about 7 years ago. She has a 10.00 pack-year smoking history. She has never used smokeless tobacco. She reports current alcohol use. Family History:   Family History   Problem Relation Age of Onset    Heart Disease Father     Heart Disease Brother         MI    Cancer Brother         stomach or prostate    Heart Disease Brother         CABG in his late 40's/early 52's    High Blood Pressure Mother     Cancer Maternal Grandmother         breast    High Blood Pressure Sister     Diabetes Paternal Grandmother     Lupus Other         Niece       Vitals:  /68   Pulse 85   Temp 97.5 °F (36.4 °C) (Oral)   Resp 16   Ht 5' 2\" (1.575 m)   Wt 270 lb (122.5 kg)   SpO2 95%   BMI 49.38 kg/m²   Temp (24hrs), Av.2 °F (36.8 °C), Min:97.5 °F (36.4 °C), Max:98.6 °F (37 °C)    No results for input(s): POCGLU in the last 72 hours. I/O (24Hr):     Intake/Output Summary (Last 24 hours) at 2021 0731  Last data filed at 2021 0706  Gross per 24 hour   Intake --   Output 250 ml   Net -250 ml       Labs:  Hematology:  Recent Labs     21  1145 21  1017 21  1122 21  1203 21  1630 21  0501   WBC 8.9 6.4  --   --   --  5.4   RBC 4.68 4.37  --   --   --  3.90*   HGB 13.3 12.4  --   --   --  11.0*   HCT 40.0 42.2  --   --   --  36.3   MCV 85.5 96.6  --   --   --  93.1   MCH 28.4 28.4  --   --   --  28.2   MCHC 33.3 29.4  --   --   --  30.3   RDW 13.1 13.4  --   -- --  13.6   PLT See Reflexed IPF Result 298  --   --   --  267   MPV NOT REPORTED 10.7  --   --   --  11.0   SEDRATE  --   --   --  81*  --   --    CRP  --   --   --   --  79.8*  --    INR  --   --  1.4  --   --  1.3   DDIMER 1.80*  --   --   --   --   --      Chemistry:  Recent Labs     08/17/21  1145 08/18/21  1017 08/19/21  0501   * 130* 131*   K 3.9 3.6* 3.8   CL 96* 101 100   CO2 18* 15* 19*   GLUCOSE 127* 111* 94   BUN 10 7* 10   CREATININE 0.53 0.52 0.66   ANIONGAP 15 14 12   LABGLOM >60 >60 >60   GFRAA >60 >60 >60   CALCIUM 9.6 8.9 8.3*   TROPHS 15*  --   --      Recent Labs     08/17/21  1145 08/18/21  1017 08/19/21  0501   PROT 7.2 6.5 5.7*   LABALBU 3.1* 2.7* 2.7*   AST 32* 29 30   ALT 26 23 23   ALKPHOS 112* 98 82   BILITOT 0.55 0.48 0.30   BILIDIR 0.12  --   --    LIPASE 24  --   --      ABG:No results found for: POCPH, PHART, PH, POCPCO2, KAT2ZME, PCO2, POCPO2, PO2ART, PO2, POCHCO3, CHL7XPM, HCO3, NBEA, PBEA, BEART, BE, THGBART, THB, DAF3MUU, QODB5RZB, W8RWIBRC, O2SAT, FIO2  Lab Results   Component Value Date/Time    SPECIAL NOT REPORTED 08/18/2021 04:31 PM     Lab Results   Component Value Date/Time    CULTURE PENDING 08/18/2021 04:31 PM       Radiology:  CT LUMBAR SPINE W CONTRAST    Result Date: 8/17/2021  Findings compatible with mild acute osteomyelitis at L3-L4. Mild central canal stenosis is suspected. 2.6 x 2.0 cm left psoas abscess located at L4-L5. CT CHEST PULMONARY EMBOLISM W CONTRAST    Result Date: 8/17/2021  1. No evidence of pulmonary embolism or acute pulmonary abnormality. 2.  Sequela of old granulomatous disease with stable 7 mm pleural based nodule in the posterior right upper lobe, for which no further evaluation is necessary.  RECOMMENDATIONS:       Physical Examination:        General appearance:  alert, cooperative and no distress  Mental Status:  oriented to person, place and time and normal affect  Lungs: Decreased breath sounds lower bases, normal effort, equal and regular breath sounds upper fields  Heart:  regular rate and rhythm, no murmur  Abdomen:  soft, nontender, nondistended, normal bowel sounds, no masses, hepatomegaly, splenomegaly  Extremities:  no edema, redness, tenderness in the calves, back pain+  Skin:  no gross lesions, rashes, induration  PICC line in place    Assessment:        Hospital Problems         Last Modified POA    * (Principal) Psoas abscess ([address] 8/18/2021 Yes    Hypertension 8/18/2021 Yes    GERD (gastroesophageal reflux disease) 8/18/2021 Yes    Morbid obesity ([address] 8/18/2021 Yes    Depression 8/18/2021 Yes    Chronic back pain 8/18/2021 Yes    Hyponatremia 8/18/2021 Yes    Lactic acid increased 8/18/2021 Yes    Anxiety 8/18/2021 Yes    Osteomyelitis of lumbar spine ([address] 8/18/2021 Yes          Plan:        Psoas muscle abscess left side likely related to osteomyelitis and E. coli sepsis -continue Rocephin as per infectious disease, awaiting culture report, 3 ml fluid drained by IR 8/18. Repeat blood cultures have been negative till now. L3-4 osteomyelitis-on IV Rocephin, infectious disease following, Percocet for pain, encouraged to work with therapy today. Grade 1 diastolic heart failure-not in fluid overload, chronic and stable, resume Lasix when able to.     Hyponatremia- improving    Nausea and vomiting-resolved, Zofran as needed    Anxiety-on Wellbutrin    Hypothyroidism-on Synthyroid    GERD-on Pepcid    Hypertension-on Cozaar, atenolol    Morbid obesity-encourage lifestyle modifications    Carb controlled diet  dvt prop- lovenox  PT OT eval  Discharge likely home with home care    Serene Roe MD  8/19/2021  7:31 AM

## 2021-08-19 NOTE — PROGRESS NOTES
LOVENOX: Patient weighs between 100-149 kg (current weight = 122.5 kg) and has adequate renal function. The recommended DVT prophylaxis dose is Lovenox (enoxaparin) 30 mg twice a day.     Thank you  Maria Luisa Santoyo, PharmD, [facility]  Inpatient Clinical Pharmacist  [phone]
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
## 2021-08-19 NOTE — DISCHARGE INSTR - COC
Continuity of Care Form    Patient Name: Jose Alejandro Bowens   :  1955  MRN:  [id number]    [address] [place] [address] date:  2021  Discharge date:  2021    Code Status Order: Full Code   Advance Directives:      Admitting Physician:  Gelacio Motta MD  PCP: Eric Cunningham DO    Discharging Nurse: Alton Salcido, THE [facility] Unit/Room#: 6615/4515-47  Discharging Unit Phone Number: [phone]    Emergency Contact:   Extended Emergency Contact Information  Primary Emergency Contact: [address] [facility] of [address] [place] [address] Phone: [phone]  Relation: Spouse    Past Surgical History:  Past Surgical History:   Procedure Laterality Date    APPENDECTOMY      COLONOSCOPY      CT ABSCESS DRAIN SUBCUTANEOUS  2021    CT ABSCESS DRAIN SUBCUTANEOUS 2021 [facility] CT SCAN    CYSTOSCOPY      HEMORRHOID SURGERY      HYSTERECTOMY      partial - both ovaries remain; done for uterine fibroid    OTHER SURGICAL HISTORY      fissure repair       Immunization History:   Immunization History   Administered Date(s) Administered    COVID-19, Pfizer, PF, 30mcg/0.3mL 2021, 2021    DTaP 1995, 2008    Pneumococcal Conjugate 13-valent (Lonell Huma) 10/08/2020    Tdap (Boostrix, Adacel) 2019    Zoster Live (Zostavax) 10/09/2014    Zoster Recombinant (Shingrix) 2020, 10/08/2020       Active Problems:  Patient Active Problem List   Diagnosis Code    Hypertension I10    Hyperlipidemia E78.5    GERD (gastroesophageal reflux disease) K21.9    Morbid obesity ([address] E66.01    Depression F32.9    Chronic back pain M54.9, G89.29    Psoas abscess ([address] K68.12    Hyponatremia E87.1    Lactic acid increased E87.2    Anxiety F41.9    Osteomyelitis of lumbar spine (HCC) M46.26    Chronic diastolic heart failure (HCC) I50.32       Isolation/Infection:   Isolation            No Isolation          Patient Infection Status       None to display            Nurse Assessment:  Last Vital Signs: BP (!) 109/54   Pulse 90   Temp 98.5 °F (36.9 °C) (Temporal)   Resp 18   Ht 5' 2\" (1.575 m)   Wt 270 lb (122.5 kg)   SpO2 93%   BMI 49.38 kg/m²     Last documented pain score (0-10 scale): Pain Level: 9  Last Weight:   Wt Readings from Last 1 Encounters:   08/18/21 270 lb (122.5 kg)     Mental Status:  oriented    IV Access:8/8/2021    Nursing Mobility/ADLs:  Walking   Independent  Transfer  Assisted  Bathing  Independent  Dressing  Assisted  Toileting  Independent  Feeding  [address]  Independent  Med Delivery   whole    Wound Care Documentation and Therapy:        Elimination:  Continence:   · Bowel: Yes  · Bladder: Yes  Urinary Catheter: None   Colostomy/Ileostomy/Ileal Conduit: No       Date of Last BM: 8/18/2021      Intake/Output Summary (Last 24 hours) at 8/19/2021 1335  Last data filed at 8/19/2021 0706  Gross per 24 hour   Intake --   Output 250 ml   Net -250 ml     I/O last 3 completed shifts:  In: -   Out: 100 [Urine:100]    Safety Concerns:     None    Impairments/Disabilities:      None    Nutrition Therapy:  Current Nutrition Therapy:   - Oral Diet:  General    Routes of Feeding: Oral  Liquids: Thin Liquids  Daily Fluid Restriction: no  Last Modified Barium Swallow with Video (Video Swallowing Test): not done    Treatments at the Time of Hospital Discharge:   Respiratory Treatments: n/a    Oxygen Therapy:  is not on home oxygen therapy.   Ventilator:    - No ventilator support    Rehab Therapies: PT/OT  Weight Bearing Status/Restrictions: No weight bearing restirctions  Other Medical Equipment (for information only, NOT a DME order):  cane and walker  Other Treatments: PICC line care      Patient's personal belongings (please select all that are sent with patient):  Glasses    RN SIGNATURE:  Electronically signed by Homer Whitney RN on 8/19/2021 at 2:01 PM      CASE MANAGEMENT/SOCIAL WORK SECTION    Inpatient Status Date: ***    Readmission Risk Assessment Score:  Readmission Risk              Risk of Unplanned Readmission:  16           Discharging to Facility/ Agency   · [facility] [address] [facility], [address] [facility] [address]         Phone: [phone]       Fax: [phone]          Dialysis Facility (if applicable)   · Name: [address] [address] [place] [address]         Phone: [phone]       Fax: [phone]        ·     / signature: Electronically signed by Margarita Ba RN on 8/19/21 at 1:36 PM EDT    PHYSICIAN SECTION    Prognosis: Fair    Condition at Discharge: Stable    Rehab Potential (if transferring to Rehab): Good    Recommended Labs or Other Treatments After Discharge:   continue  antibiotics as ordered: 2 g of Rocephin IV daily through 9/25/2021  Check CBC, BMP, CRP on Mondays  Schedule for follow-up visit in 4 weeks  Fax results to Brodie Collazo MD FAX: [phone]      Physician Certification: I certify the above information and transfer of Anthony Tim  is necessary for the continuing treatment of the diagnosis listed and that she requires Home Care for greater 30 days.      Update Admission H&P: Changes in H&P as follows - discharge summary to follow    PHYSICIAN SIGNATURE:  Electronically signed by Luz Bowens MD on 8/19/21 at 2:12 PM EDT

## 2021-08-19 NOTE — PLAN OF CARE
No acute events have occurred overnight      Problem: Falls - Risk of:  Goal: Will remain free from falls  Description: Will remain free from falls  Outcome: Met This Shift  Goal: Absence of physical injury  Description: Absence of physical injury  Outcome: Met This Shift     Problem: Pain:  Goal: Pain level will decrease  Description: Pain level will decrease  Outcome: Ongoing  Goal: Control of acute pain  Description: Control of acute pain  Outcome: Ongoing  Goal: Control of chronic pain  Description: Control of chronic pain  Outcome: Ongoing     Problem: Skin Integrity:  Goal: Will show no infection signs and symptoms  Description: Will show no infection signs and symptoms  Outcome: Ongoing  Goal: Absence of new skin breakdown  Description: Absence of new skin breakdown  Outcome: Ongoing

## 2021-08-19 NOTE — CARE COORDINATION
[facility] Flow/Interdisciplinary Rounds Progress Note    Quality Flow Rounds held on August 19, 2021 at [address] [facility] Attending:  Bedside Nurse,  and Nursing Unit Leadership    Barriers to Discharge:     Anticipated Discharge Date:       Anticipated Discharge Disposition:    Readmission Risk              Risk of Unplanned Readmission:  16           Discussed patient goal for the day, patient clinical progression, and barriers to discharge. The following Goal(s) of the Day/Commitment(s) have been identified:  Home w/ HC and IV antibiotics   Pt states she is current w/ [facility] and [facility].  Dr. Mistry [place] rounded, he asked that CM verify pt is receiving 2g rocephin daily IV at home. He informed nursing that they can use pt's PICC line. Tiff 5 to Tala with [facility], she confirmed pt is receiving 2g IV rocephin daily and that pt has [facility] as HC.     1328 Confirmed with Faviola with [facility] that pt is current with [facility]    1330 PS Dr. Mistry [place] to inform that pt is receiving 2 abbey  Rocephin IV daily with [facility]. 1649 Confirmed with Tala with [facility] that Rocephin 2g IV daily is ordered through 9/24 by Daryle Perone DO    6694 informed Fareed Calvin with [facility] that pt is discharging today. 1654 informed Tuyet with [facility] that pt is discharged today. The will gather what they need in Epic and call pt for start of care.      Rufino Lester RN  August 19, 2021
No    Readmission Risk              Risk of Unplanned Readmission:  12             Does patient have a readmission risk score greater than 14?: No  If yes, follow-up appointment must be made within 7 days of discharge.      Goals of Care:       Educated pt on transitional options,will provide as needed the freedom of choice pt is  agreeable with plan      Discharge Plan: Home with [facility] and [facility] vs SNF - await evaluations and follow for needs           Electronically signed by Kasia Minaya RN on 8/18/21 at 12:38 PM EDT

## 2021-08-20 ENCOUNTER — CARE COORDINATION (OUTPATIENT)
Dept: CASE MANAGEMENT | Age: 66
End: 2021-08-20

## 2021-08-20 DIAGNOSIS — K68.12 PSOAS ABSCESS (HCC): Primary | ICD-10-CM

## 2021-08-20 PROCEDURE — 1111F DSCHRG MED/CURRENT MED MERGE: CPT | Performed by: FAMILY MEDICINE

## 2021-08-20 NOTE — PLAN OF CARE
Perfect served by the RN,  patient was discharged and she called from the way saying that she doesn't have any more percocet at home. Order for percocet placed for 20 tablets, advised patient to follow up with the PCP for the next refill. Patient has pain due to osteomyelitis and psoas abscess, will need refill. Order placed in the chart for [facility].

## 2021-08-20 NOTE — CARE COORDINATION
Request from 1 [facility], LPN    Please schedule patient for 7 day hospital f/u. Patient scheduled for 8/25 @ 10: 30 with Dr. Consuelo Mary. Called patient, states she is extremely nauseated today, this was documented by CTN. Linette Jj Ask if this SS would speak with her grandson Jose D Gregorio. Patient did state she is fine with the appt with Dr. Consuelo Mary. Jose D Gregorio took down date and time.     Iris Galvez, [address] [facility]  Care Coordination Transition

## 2021-08-20 NOTE — CARE COORDINATION
[address] Transitions Initial Follow Up Call    Call within 2 business days of discharge: Yes    Patient: Karoline Montoya Patient : 1955   MRN: <[id number]>  Reason for Admission: Psoas abscess [facility])  Discharge Date: 21 RARS: Readmission Risk Score: 16      Last Discharge [address]       Complaint Diagnosis Description Type Department Provider    21  Psoas abscess ([facility] [address] . .. Admission (Discharged) Patsy Pablo MD           Spoke with: Ta Gillespie she states she is doing as best as she can still has back pain, had nausea this am. Denies chest pain, SOB, fever , chills. Lacie 78 nurse from [address] [place] [address] was in this am for PICC line medication. Medication reviewed with  and taking as directed. 1111 f complete. Patient has walker , bedside commode, and walk in shower with shower bench . Eating and drinking well and normal bowel and bladder elimination no concerns noted. Facility: [facility]    Non-face-to-face services provided:  Obtained and reviewed discharge summary and/or continuity of care documents  Transitions of Care Initial Call    Was this an external facility discharge? No     Challenges to be reviewed by the provider   Additional needs identified to be addressed with provider: No  none             Method of communication with provider : none      Advance Care Planning:   Does patient have an Advance Directive: reviewed and current, reviewed and needs to be updated, not on file; education provided, patient declined education, decision maker updated and referral to internal ACP facilitator. Was this a readmission? No  Patient stated reason for admission: back pain nausea  Patients top risk factors for readmission: functional physical ability, lack of knowledge about disease and medication management    Care Transition Nurse (CTN) contacted the patient by telephone to perform post hospital discharge assessment. Verified name and  with patient as identifiers.  Provided Yes  Patient-reported symptoms: Pain, Nausea  Interventions for patient-reported symptoms: Notified Home Care  Do you have a copy of your discharge instructions?: Yes  Do you have all of your prescriptions and are they filled?: Yes  Have you been contacted by a [address]?: No  Have you scheduled your follow up appointment?: Yes  How are you going to get to your appointment?: Car - family or friend to transport  Were you discharged with any Home Care or Post Acute Services: Yes  Post Acute Services:  (Comment: ohiomalika)  Do you feel like you have everything you need to keep you well at home?: Yes  Care Transitions Interventions         Follow Up  Future Appointments   Date Time Provider Cal Roblero   9/8/2021  9:40 AM DO ESTELA Kaminski Junior BORIS Pederson LPN

## 2021-08-23 ENCOUNTER — HOSPITAL ENCOUNTER (OUTPATIENT)
Age: 66
Setting detail: SPECIMEN
Discharge: HOME | End: 2021-08-23
Payer: MEDICARE

## 2021-08-23 ENCOUNTER — TELEPHONE (OUTPATIENT)
Dept: FAMILY MEDICINE CLINIC | Age: 66
End: 2021-08-23

## 2021-08-23 DIAGNOSIS — R11.0 NAUSEA: Primary | ICD-10-CM

## 2021-08-23 LAB
ABSOLUTE EOS #: 0.09 K/UL (ref 0–0.44)
ABSOLUTE IMMATURE GRANULOCYTE: <0.03 K/UL (ref 0–0.3)
ABSOLUTE LYMPH #: 1.32 K/UL (ref 1.1–3.7)
ABSOLUTE MONO #: 0.49 K/UL (ref 0.1–1.2)
ANION GAP SERPL CALCULATED.3IONS-SCNC: 10 MMOL/L (ref 9–17)
BASOPHILS # BLD: 1 % (ref 0–2)
BASOPHILS ABSOLUTE: <0.03 K/UL (ref 0–0.2)
BUN BLDV-MCNC: 6 MG/DL (ref 8–23)
BUN/CREAT BLD: 13 (ref 9–20)
C-REACTIVE PROTEIN: 15.2 MG/L (ref 0–5)
CALCIUM SERPL-MCNC: 9.1 MG/DL (ref 8.6–10.4)
CHLORIDE BLD-SCNC: 101 MMOL/L (ref 98–107)
CO2: 25 MMOL/L (ref 20–31)
CREAT SERPL-MCNC: 0.47 MG/DL (ref 0.5–0.9)
CULTURE: ABNORMAL
CULTURE: ABNORMAL
CULTURE: NORMAL
CULTURE: NORMAL
DIFFERENTIAL TYPE: ABNORMAL
DIRECT EXAM: ABNORMAL
DIRECT EXAM: ABNORMAL
EOSINOPHILS RELATIVE PERCENT: 2 % (ref 1–4)
GFR AFRICAN AMERICAN: >60 ML/MIN
GFR NON-AFRICAN AMERICAN: >60 ML/MIN
GFR SERPL CREATININE-BSD FRML MDRD: ABNORMAL ML/MIN/{1.73_M2}
GFR SERPL CREATININE-BSD FRML MDRD: ABNORMAL ML/MIN/{1.73_M2}
GLUCOSE BLD-MCNC: 97 MG/DL (ref 70–99)
HCT VFR BLD CALC: 33.6 % (ref 36.3–47.1)
HEMOGLOBIN: 11.2 G/DL (ref 11.9–15.1)
IMMATURE GRANULOCYTES: 0 %
LYMPHOCYTES # BLD: 33 % (ref 24–43)
Lab: ABNORMAL
Lab: NORMAL
Lab: NORMAL
MCH RBC QN AUTO: 28.9 PG (ref 25.2–33.5)
MCHC RBC AUTO-ENTMCNC: 33.3 G/DL (ref 25.2–33.5)
MCV RBC AUTO: 86.8 FL (ref 82.6–102.9)
MONOCYTES # BLD: 12 % (ref 3–12)
NRBC AUTOMATED: 0 PER 100 WBC
PDW BLD-RTO: 13.1 % (ref 11.8–14.4)
PLATELET # BLD: 293 K/UL (ref 138–453)
PLATELET ESTIMATE: ABNORMAL
PMV BLD AUTO: 11 FL (ref 8.1–13.5)
POTASSIUM SERPL-SCNC: 3.4 MMOL/L (ref 3.7–5.3)
RBC # BLD: 3.87 M/UL (ref 3.95–5.11)
RBC # BLD: ABNORMAL 10*6/UL
SEG NEUTROPHILS: 53 % (ref 36–65)
SEGMENTED NEUTROPHILS ABSOLUTE COUNT: 2.13 K/UL (ref 1.5–8.1)
SODIUM BLD-SCNC: 136 MMOL/L (ref 135–144)
SPECIMEN DESCRIPTION: ABNORMAL
SPECIMEN DESCRIPTION: NORMAL
SPECIMEN DESCRIPTION: NORMAL
WBC # BLD: 4.1 K/UL (ref 3.5–11.3)
WBC # BLD: ABNORMAL 10*3/UL

## 2021-08-23 PROCEDURE — 86140 C-REACTIVE PROTEIN: CPT

## 2021-08-23 PROCEDURE — 85025 COMPLETE CBC W/AUTO DIFF WBC: CPT

## 2021-08-23 PROCEDURE — 80048 BASIC METABOLIC PNL TOTAL CA: CPT

## 2021-08-23 NOTE — TELEPHONE ENCOUNTER
----- Message from Lucy Saunders sent at 8/20/2021 10:51 AM EDT -----  Subject: Appointment Request    Reason for Call: Routine Existing Condition Follow Up    QUESTIONS  Type of Appointment? Established Patient  Reason for appointment request? No appointments available during search  Additional Information for Provider? Patient was calling in to schedule a   follow up she said it had to be within 3 days the first available was   November please call patient back with sooner appointment.   ---------------------------------------------------------------------------  --------------  [address]  What is the best way for the office to contact you? OK to leave message on   voicemail  Preferred Call Back Phone Number? [phone]  ---------------------------------------------------------------------------  --------------  SCRIPT ANSWERS  Relationship to Patient? Other  Representative Name? will  Additional information verified (besides Name and Date of Birth)? Phone   Number  (Is the patient requesting to be seen urgently for their symptoms?)? No  Is this follow up request related to routine Diabetes Management? No  Are you having any new concerns about your existing condition? No  Have you been diagnosed with, awaiting test results for, or told that you   are suspected of having COVID-19 (Coronavirus)? (If patient has tested   negative or was tested as a requirement for work, school, or travel and   not based on symptoms, answer no)? No  Do you currently have flu-like symptoms including fever or chills, cough,   shortness of breath, difficulty breathing, or new loss of taste or smell? No  Have you had close contact with someone with COVID-19 in the last 14 days? No  (Service Expert  click yes below to proceed with [email or web address].rs As Usual   Scheduling)?  Yes

## 2021-08-23 NOTE — TELEPHONE ENCOUNTER
----- Message from April Kathy sent at 8/23/2021  2:32 PM EDT -----  Subject: Message to Provider    QUESTIONS  Information for Provider? patient would like a call as soon as orders for   nausea medications are sent to her pharmacy. patient was crying on phone   and very sick/nauseous. ---------------------------------------------------------------------------  --------------  Binta Carrier INFO  What is the best way for the office to contact you? OK to leave message on   voicemail  Preferred Call Back Phone Number? [phone]  ---------------------------------------------------------------------------  --------------  SCRIPT ANSWERS  Relationship to Patient?  Self

## 2021-08-23 NOTE — TELEPHONE ENCOUNTER
Please advise, d/c from  [facility] on 8/20, was not sent home with any nausea meds. F/u with James on 8/25.

## 2021-08-23 NOTE — TELEPHONE ENCOUNTER
Alirio Nichols with [facility] calling stating pt has had nausea and vomiting since starting the IV antibiotics and they would like something called in for pt, pt uses [facility], please advise at above number, may contact Alirio Nichols at [phone]

## 2021-08-24 ENCOUNTER — CARE COORDINATION (OUTPATIENT)
Dept: CASE MANAGEMENT | Age: 66
End: 2021-08-24

## 2021-08-24 RX ORDER — ONDANSETRON 4 MG/1
4 TABLET, FILM COATED ORAL EVERY 8 HOURS PRN
Qty: 30 TABLET | Refills: 1 | Status: SHIPPED | OUTPATIENT
Start: 2021-08-24 | End: 2021-10-20 | Stop reason: ALTCHOICE

## 2021-08-24 NOTE — TELEPHONE ENCOUNTER
Rx for Zofran sent to [facility] - please inform pt. If this is not working, she should inform the office. She should stay well-hydrated due to increased vomiting, and side effect of possible constipation from Zofran.

## 2021-08-25 ENCOUNTER — OFFICE VISIT (OUTPATIENT)
Dept: FAMILY MEDICINE CLINIC | Age: 66
End: 2021-08-25
Payer: MEDICARE

## 2021-08-25 VITALS
HEART RATE: 68 BPM | BODY MASS INDEX: 48.03 KG/M2 | HEIGHT: 62 IN | TEMPERATURE: 98 F | WEIGHT: 261 LBS | SYSTOLIC BLOOD PRESSURE: 118 MMHG | DIASTOLIC BLOOD PRESSURE: 78 MMHG | OXYGEN SATURATION: 98 %

## 2021-08-25 DIAGNOSIS — M54.50 CHRONIC LOW BACK PAIN, UNSPECIFIED BACK PAIN LATERALITY, UNSPECIFIED WHETHER SCIATICA PRESENT: ICD-10-CM

## 2021-08-25 DIAGNOSIS — G89.29 CHRONIC LOW BACK PAIN, UNSPECIFIED BACK PAIN LATERALITY, UNSPECIFIED WHETHER SCIATICA PRESENT: ICD-10-CM

## 2021-08-25 DIAGNOSIS — I10 ESSENTIAL HYPERTENSION: ICD-10-CM

## 2021-08-25 DIAGNOSIS — E03.9 HYPOTHYROIDISM, UNSPECIFIED TYPE: ICD-10-CM

## 2021-08-25 DIAGNOSIS — K68.12 PSOAS ABSCESS, LEFT (HCC): Primary | ICD-10-CM

## 2021-08-25 PROCEDURE — 99212 OFFICE O/P EST SF 10 MIN: CPT

## 2021-08-25 PROCEDURE — G8427 DOCREV CUR MEDS BY ELIG CLIN: HCPCS | Performed by: FAMILY MEDICINE

## 2021-08-25 PROCEDURE — 3017F COLORECTAL CA SCREEN DOC REV: CPT | Performed by: FAMILY MEDICINE

## 2021-08-25 PROCEDURE — 4004F PT TOBACCO SCREEN RCVD TLK: CPT | Performed by: FAMILY MEDICINE

## 2021-08-25 PROCEDURE — 99214 OFFICE O/P EST MOD 30 MIN: CPT | Performed by: FAMILY MEDICINE

## 2021-08-25 PROCEDURE — 4040F PNEUMOC VAC/ADMIN/RCVD: CPT | Performed by: FAMILY MEDICINE

## 2021-08-25 PROCEDURE — 1111F DSCHRG MED/CURRENT MED MERGE: CPT | Performed by: FAMILY MEDICINE

## 2021-08-25 PROCEDURE — 1123F ACP DISCUSS/DSCN MKR DOCD: CPT | Performed by: FAMILY MEDICINE

## 2021-08-25 PROCEDURE — G8417 CALC BMI ABV UP PARAM F/U: HCPCS | Performed by: FAMILY MEDICINE

## 2021-08-25 PROCEDURE — G8400 PT W/DXA NO RESULTS DOC: HCPCS | Performed by: FAMILY MEDICINE

## 2021-08-25 PROCEDURE — 1090F PRES/ABSN URINE INCON ASSESS: CPT | Performed by: FAMILY MEDICINE
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
## 2021-08-31 ENCOUNTER — HOSPITAL ENCOUNTER (OUTPATIENT)
Age: 66
Setting detail: SPECIMEN
Discharge: HOME | End: 2021-08-31
Payer: MEDICARE

## 2021-08-31 ENCOUNTER — CARE COORDINATION (OUTPATIENT)
Dept: CASE MANAGEMENT | Age: 66
End: 2021-08-31

## 2021-08-31 LAB
ABSOLUTE EOS #: 0.11 K/UL (ref 0–0.44)
ABSOLUTE IMMATURE GRANULOCYTE: <0.03 K/UL (ref 0–0.3)
ABSOLUTE LYMPH #: 1.34 K/UL (ref 1.1–3.7)
ABSOLUTE MONO #: 0.46 K/UL (ref 0.1–1.2)
ANION GAP SERPL CALCULATED.3IONS-SCNC: 11 MMOL/L (ref 9–17)
BASOPHILS # BLD: 1 % (ref 0–2)
BASOPHILS ABSOLUTE: 0.03 K/UL (ref 0–0.2)
BUN BLDV-MCNC: 6 MG/DL (ref 8–23)
BUN/CREAT BLD: 13 (ref 9–20)
C-REACTIVE PROTEIN: 9.8 MG/L (ref 0–5)
CALCIUM SERPL-MCNC: 9 MG/DL (ref 8.6–10.4)
CHLORIDE BLD-SCNC: 100 MMOL/L (ref 98–107)
CO2: 26 MMOL/L (ref 20–31)
CREAT SERPL-MCNC: 0.47 MG/DL (ref 0.5–0.9)
DIFFERENTIAL TYPE: ABNORMAL
EOSINOPHILS RELATIVE PERCENT: 3 % (ref 1–4)
GFR AFRICAN AMERICAN: >60 ML/MIN
GFR NON-AFRICAN AMERICAN: >60 ML/MIN
GFR SERPL CREATININE-BSD FRML MDRD: ABNORMAL ML/MIN/{1.73_M2}
GFR SERPL CREATININE-BSD FRML MDRD: ABNORMAL ML/MIN/{1.73_M2}
GLUCOSE BLD-MCNC: 108 MG/DL (ref 70–99)
HCT VFR BLD CALC: 34.4 % (ref 36.3–47.1)
HEMOGLOBIN: 11.3 G/DL (ref 11.9–15.1)
IMMATURE GRANULOCYTES: 0 %
LYMPHOCYTES # BLD: 31 % (ref 24–43)
MCH RBC QN AUTO: 29 PG (ref 25.2–33.5)
MCHC RBC AUTO-ENTMCNC: 32.8 G/DL (ref 25.2–33.5)
MCV RBC AUTO: 88.2 FL (ref 82.6–102.9)
MONOCYTES # BLD: 11 % (ref 3–12)
NRBC AUTOMATED: 0 PER 100 WBC
PDW BLD-RTO: 13.3 % (ref 11.8–14.4)
PLATELET # BLD: 233 K/UL (ref 138–453)
PLATELET ESTIMATE: ABNORMAL
PMV BLD AUTO: 11.3 FL (ref 8.1–13.5)
POTASSIUM SERPL-SCNC: 3 MMOL/L (ref 3.7–5.3)
RBC # BLD: 3.9 M/UL (ref 3.95–5.11)
RBC # BLD: ABNORMAL 10*6/UL
SEG NEUTROPHILS: 54 % (ref 36–65)
SEGMENTED NEUTROPHILS ABSOLUTE COUNT: 2.4 K/UL (ref 1.5–8.1)
SODIUM BLD-SCNC: 137 MMOL/L (ref 135–144)
WBC # BLD: 4.4 K/UL (ref 3.5–11.3)
WBC # BLD: ABNORMAL 10*3/UL

## 2021-08-31 PROCEDURE — 80048 BASIC METABOLIC PNL TOTAL CA: CPT

## 2021-08-31 PROCEDURE — 86140 C-REACTIVE PROTEIN: CPT

## 2021-08-31 PROCEDURE — 85025 COMPLETE CBC W/AUTO DIFF WBC: CPT

## 2021-08-31 NOTE — CARE COORDINATION
[facility] Transitions Follow Up Call    2021    Patient: Lala Judd  Patient : 1955   MRN: <[id number]>  Reason for Admission: Psoas abscess   Discharge Date: 21 RARS: Readmission Risk Score: 16         Spoke with: Neva Brunner states she is doing better her pain is managed and she is going longer times without having it. She does have Left sacrum tightness some mornings making it hard to get up but overall things are improving, she is doing PT/OT now eating and drinking well 2 days with no nausea and vomiting after meals. No concerns noted  Care Transitions Follow Up Call    Needs to be reviewed by the provider   Additional needs identified to be addressed with provider: No  none             Method of communication with provider : none      Care Transition Nurse (CTN) contacted the patient by telephone to follow up after admission on . Verified name and  with patient as identifiers. Addressed changes since last contact: improving slowly but seeing results from pt/ot  Discussed follow-up appointments. If no appointment was previously scheduled, appointment scheduling offered: Yes. Is follow up appointment scheduled within 7 days of discharge? No.    Advance Care Planning:   Does patient have an Advance Directive: reviewed and current, reviewed and needs to be updated, not on file; education provided, patient declined education, decision maker updated and referral to internal ACP facilitator. CTN reviewed discharge instructions, medical action plan and red flags with patient and discussed any barriers to care and/or understanding of plan of care after discharge. Discussed appropriate site of care based on symptoms and resources available to patient including: PCP, Specialist and Home health. The patient agrees to contact the PCP office for questions related to their healthcare.      Patients top risk factors for readmission: functional physical ability  Interventions to address risk factors: Obtained and reviewed discharge summary and/or continuity of care documents          CTN provided contact information for future needs. Plan for follow-up call in 7-10 days based on severity of symptoms and risk factors. Plan for next call: symptom management-routine          Care Transitions Subsequent and Final Call    Subsequent and Final Calls  Do you have any ongoing symptoms?: Yes  Onset of Patient-reported symptoms: In the past 7 days  Patient-reported symptoms: Weakness, Pain, Nausea  Interventions for patient-reported symptoms: Other  Have your medications changed?: No  Do you have any questions related to your medications?: No  Do you currently have any active services?: Yes  Do you have any needs or concerns that I can assist you with?: No  Identified Barriers: Lack of Education, Other  Care Transitions Interventions  Other Interventions:            Follow Up  Future Appointments   Date Time Provider Cal Roblero   9/8/2021  9:40 AM [facility]       Ehsan Olivera LPN

## 2021-09-06 ENCOUNTER — HOSPITAL ENCOUNTER (OUTPATIENT)
Age: 66
Setting detail: SPECIMEN
Discharge: HOME | End: 2021-09-06
Payer: MEDICARE

## 2021-09-06 LAB
ABSOLUTE EOS #: 0.12 K/UL (ref 0–0.44)
ABSOLUTE IMMATURE GRANULOCYTE: <0.03 K/UL (ref 0–0.3)
ABSOLUTE LYMPH #: 1.2 K/UL (ref 1.1–3.7)
ABSOLUTE MONO #: 0.35 K/UL (ref 0.1–1.2)
ANION GAP SERPL CALCULATED.3IONS-SCNC: 10 MMOL/L (ref 9–17)
BASOPHILS # BLD: 1 % (ref 0–2)
BASOPHILS ABSOLUTE: 0.03 K/UL (ref 0–0.2)
BUN BLDV-MCNC: 6 MG/DL (ref 8–23)
BUN/CREAT BLD: 13 (ref 9–20)
CALCIUM SERPL-MCNC: 9.3 MG/DL (ref 8.6–10.4)
CHLORIDE BLD-SCNC: 99 MMOL/L (ref 98–107)
CO2: 26 MMOL/L (ref 20–31)
CREAT SERPL-MCNC: 0.47 MG/DL (ref 0.5–0.9)
DIFFERENTIAL TYPE: ABNORMAL
EOSINOPHILS RELATIVE PERCENT: 3 % (ref 1–4)
GFR AFRICAN AMERICAN: >60 ML/MIN
GFR NON-AFRICAN AMERICAN: >60 ML/MIN
GFR SERPL CREATININE-BSD FRML MDRD: ABNORMAL ML/MIN/{1.73_M2}
GFR SERPL CREATININE-BSD FRML MDRD: ABNORMAL ML/MIN/{1.73_M2}
GLUCOSE BLD-MCNC: 119 MG/DL (ref 70–99)
HCT VFR BLD CALC: 34.4 % (ref 36.3–47.1)
HEMOGLOBIN: 11.3 G/DL (ref 11.9–15.1)
IMMATURE GRANULOCYTES: 0 %
LYMPHOCYTES # BLD: 30 % (ref 24–43)
MCH RBC QN AUTO: 29.2 PG (ref 25.2–33.5)
MCHC RBC AUTO-ENTMCNC: 32.8 G/DL (ref 25.2–33.5)
MCV RBC AUTO: 88.9 FL (ref 82.6–102.9)
MONOCYTES # BLD: 9 % (ref 3–12)
NRBC AUTOMATED: 0 PER 100 WBC
PDW BLD-RTO: 13.8 % (ref 11.8–14.4)
PLATELET # BLD: 237 K/UL (ref 138–453)
PLATELET ESTIMATE: ABNORMAL
PMV BLD AUTO: 11.6 FL (ref 8.1–13.5)
POTASSIUM SERPL-SCNC: 3.6 MMOL/L (ref 3.7–5.3)
RBC # BLD: 3.87 M/UL (ref 3.95–5.11)
RBC # BLD: ABNORMAL 10*6/UL
SEG NEUTROPHILS: 57 % (ref 36–65)
SEGMENTED NEUTROPHILS ABSOLUTE COUNT: 2.24 K/UL (ref 1.5–8.1)
SODIUM BLD-SCNC: 135 MMOL/L (ref 135–144)
WBC # BLD: 4 K/UL (ref 3.5–11.3)
WBC # BLD: ABNORMAL 10*3/UL

## 2021-09-06 PROCEDURE — 80048 BASIC METABOLIC PNL TOTAL CA: CPT

## 2021-09-06 PROCEDURE — 85025 COMPLETE CBC W/AUTO DIFF WBC: CPT

## 2021-09-06 PROCEDURE — 86140 C-REACTIVE PROTEIN: CPT

## 2021-09-07 LAB — C-REACTIVE PROTEIN: 7.4 MG/L (ref 0–5)

## 2021-09-08 ENCOUNTER — OFFICE VISIT (OUTPATIENT)
Dept: FAMILY MEDICINE CLINIC | Age: 66
End: 2021-09-08
Payer: MEDICARE

## 2021-09-08 VITALS
OXYGEN SATURATION: 98 % | DIASTOLIC BLOOD PRESSURE: 88 MMHG | HEIGHT: 62 IN | SYSTOLIC BLOOD PRESSURE: 132 MMHG | WEIGHT: 259.2 LBS | TEMPERATURE: 96.2 F | BODY MASS INDEX: 47.7 KG/M2 | HEART RATE: 87 BPM

## 2021-09-08 DIAGNOSIS — R11.0 NAUSEA: ICD-10-CM

## 2021-09-08 DIAGNOSIS — L89.329 PRESSURE INJURY OF SKIN OF LEFT BUTTOCK, UNSPECIFIED INJURY STAGE: ICD-10-CM

## 2021-09-08 DIAGNOSIS — M46.26 OSTEOMYELITIS OF LUMBAR SPINE (HCC): Primary | ICD-10-CM

## 2021-09-08 DIAGNOSIS — B96.20 E COLI BACTEREMIA: ICD-10-CM

## 2021-09-08 DIAGNOSIS — F41.9 ANXIETY: ICD-10-CM

## 2021-09-08 DIAGNOSIS — R78.81 E COLI BACTEREMIA: ICD-10-CM

## 2021-09-08 DIAGNOSIS — E03.9 HYPOTHYROIDISM, UNSPECIFIED TYPE: ICD-10-CM

## 2021-09-08 DIAGNOSIS — K68.12 PSOAS ABSCESS, LEFT (HCC): ICD-10-CM

## 2021-09-08 PROCEDURE — G8417 CALC BMI ABV UP PARAM F/U: HCPCS | Performed by: FAMILY MEDICINE

## 2021-09-08 PROCEDURE — G8427 DOCREV CUR MEDS BY ELIG CLIN: HCPCS | Performed by: FAMILY MEDICINE

## 2021-09-08 PROCEDURE — G8400 PT W/DXA NO RESULTS DOC: HCPCS | Performed by: FAMILY MEDICINE

## 2021-09-08 PROCEDURE — 4004F PT TOBACCO SCREEN RCVD TLK: CPT | Performed by: FAMILY MEDICINE

## 2021-09-08 PROCEDURE — 99214 OFFICE O/P EST MOD 30 MIN: CPT | Performed by: FAMILY MEDICINE

## 2021-09-08 PROCEDURE — 1090F PRES/ABSN URINE INCON ASSESS: CPT | Performed by: FAMILY MEDICINE

## 2021-09-08 PROCEDURE — 4040F PNEUMOC VAC/ADMIN/RCVD: CPT | Performed by: FAMILY MEDICINE

## 2021-09-08 PROCEDURE — 1123F ACP DISCUSS/DSCN MKR DOCD: CPT | Performed by: FAMILY MEDICINE

## 2021-09-08 PROCEDURE — 1111F DSCHRG MED/CURRENT MED MERGE: CPT | Performed by: FAMILY MEDICINE

## 2021-09-08 PROCEDURE — 3017F COLORECTAL CA SCREEN DOC REV: CPT | Performed by: FAMILY MEDICINE

## 2021-09-08 PROCEDURE — 99213 OFFICE O/P EST LOW 20 MIN: CPT

## 2021-09-08 ASSESSMENT — ENCOUNTER SYMPTOMS
BACK PAIN: 1
NAUSEA: 1
VOMITING: 0

## 2021-09-08 NOTE — PROGRESS NOTES
Family History   Problem Relation Age of Onset    Heart Disease Father     Heart Disease Brother         MI    Cancer Brother         stomach or prostate    Heart Disease Brother         CABG in his late 40's/early 52's    High Blood Pressure Mother     Cancer Maternal Grandmother         breast    High Blood Pressure Sister     Diabetes Paternal Grandmother     Lupus Other         Niece     Social History     Tobacco Use    Smoking status: Current Some Day Smoker     Packs/day: 0.25     Years: 40.00     Pack years: 10.00     Types: Cigarettes     Start date: 2014     Last attempt to quit: 2016     Years since quittin.4    Smokeless tobacco: Never Used   Substance Use Topics    Alcohol use: Yes     Alcohol/week: 0.0 standard drinks     Comment: social      Current Outpatient Medications   Medication Sig Dispense Refill    ondansetron (ZOFRAN) 4 MG tablet Take 1 tablet by mouth every 8 hours as needed for Nausea 30 tablet 1    levothyroxine (SYNTHROID) 25 MCG tablet Take 25 mcg by mouth Daily       oxyCODONE-acetaminophen (PERCOCET) 5-325 MG per tablet Take 1 tablet by mouth every 4 hours as needed for Pain.  STIMULANT LAXATIVE 8.6-50 MG per tablet Take 2 tablets by mouth nightly      PSYLLIUM PO Take by mouth      cyclobenzaprine (FLEXERIL) 10 MG tablet Take 1 tablet by mouth 3 times daily as needed for Muscle spasms 40 tablet 1    atorvastatin (LIPITOR) 20 MG tablet TAKE 1 TABLET DAILY 90 tablet 3    buPROPion (WELLBUTRIN XL) 300 MG extended release tablet TAKE 1 TABLET EVERY MORNING 90 tablet 3    losartan (COZAAR) 25 MG tablet TAKE 1 TABLET DAILY 90 tablet 3    atenolol (TENORMIN) 50 MG tablet TAKE 1 TABLET DAILY 90 tablet 3    Scooter MISC Use daily as needed. 1 each 0    furosemide (LASIX) 20 MG tablet TAKE 1 TABLET DAILY 90 tablet 3    cefTRIAXone (ROCEPHIN) 2 g injection        No current facility-administered medications for this visit.      Allergies   Allergen Reactions    Benazepril Other (See Comments)     cough    Dynabac [Dirithromycin]     Levothyroxine Other (See Comments)     Caused leg swelling, headache, insomnia, nausea.  Lisinopril Other (See Comments)     Cough      Other      Generic Prozac       Health Maintenance   Topic Date Due    DEXA (modify frequency per FRAX score)  Never done    Annual Wellness Visit (AWV)  Never done    Breast cancer screen  07/10/2020    COVID-19 Vaccine (2 - Pfizer 2-dose series) 06/22/2021    Flu vaccine (1) Never done    Pneumococcal 65+ years Vaccine (2 of 2 - PPSV23) 10/08/2021    Lipid screen  12/23/2021    Potassium monitoring  09/20/2022    Creatinine monitoring  09/20/2022    Diabetes screen  10/31/2022    Colon cancer screen fecal DNA test (Cologuard)  05/22/2024    DTaP/Tdap/Td vaccine (4 - Td or Tdap) 11/06/2029    Shingles Vaccine  Completed    Hepatitis C screen  Completed    Hepatitis A vaccine  Aged Out    Hepatitis B vaccine  Aged Out    Hib vaccine  Aged Out    Meningococcal (ACWY) vaccine  Aged Out       Subjective:      Review of Systems   Constitutional: Unexpected weight change: down 33 lbs since her OV on 5/11. Gastrointestinal: Positive for nausea. Negative for vomiting. Musculoskeletal: Positive for back pain (lumbosacral, worse on the L; hurts most when she stands up). Objective:     Vitals:    09/08/21 0952   BP: 132/88   Site: Left Upper Arm   Position: Sitting   Cuff Size: Large Adult   Pulse: 87   Temp: 96.2 °F (35.7 °C)   TempSrc: Tympanic   SpO2: 98%   Weight: 259 lb 3.2 oz (117.6 kg)   Height: 5' 2\" (1.575 m)     Physical Exam  Vitals and nursing note reviewed. Constitutional:       General: She is not in acute distress. Appearance: She is well-developed. HENT:      Head: Normocephalic and atraumatic.       Right Ear: Tympanic membrane, ear canal and external ear normal.      Left Ear: Tympanic membrane, ear canal and external ear normal.      Nose: Nose normal.      Mouth/Throat:      Mouth: Mucous membranes are moist.      Pharynx: Oropharynx is clear. No posterior oropharyngeal erythema. Eyes:      Conjunctiva/sclera: Conjunctivae normal.   Cardiovascular:      Rate and Rhythm: Normal rate and regular rhythm. Heart sounds: Normal heart sounds. Pulmonary:      Effort: Pulmonary effort is normal. No respiratory distress. Breath sounds: Normal breath sounds. Abdominal:      General: Bowel sounds are normal. There is no distension. Palpations: Abdomen is soft. Tenderness: There is no abdominal tenderness. Musculoskeletal:      Cervical back: Neck supple. Skin:     General: Skin is warm and dry. Neurological:      Mental Status: She is alert and oriented to person, place, and time. Assessment:      1. Osteomyelitis of lumbar spine (HCC)  2. E coli bacteremia  3. Psoas abscess, left ([address]  4. Pressure injury of skin of left buttock, unspecified injury stage  5. Nausea  6. Anxiety  7. Hypothyroidism, unspecified type  -     TSH; Future  -     T4, Free; Future         Plan:      Return in about 2 months (around 11/15/2021) for f/u anxiety, nausea. Orders Placed This Encounter   Procedures    TSH     Standing Status:   Future     Standing Expiration Date:   12/30/2022    T4, Free     Standing Status:   Future     Standing Expiration Date:   12/30/2022     No orders of the defined types were placed in this encounter. Patient given educational materials - see patient instructions. Discussed use, benefit, and side effects of prescribed medications. All patient questions answered. Pt voiced understanding. Reviewed health maintenance.             Electronically signed by Yves Gardiner DO, DO on 10/4/2021 at 12:16 AM

## 2021-09-09 ENCOUNTER — CARE COORDINATION (OUTPATIENT)
Dept: CASE MANAGEMENT | Age: 66
End: 2021-09-09

## 2021-09-09 NOTE — CARE COORDINATION
[facility] Transitions Follow Up Call    2021    Patient: Garry Hagan  Patient : 1955   MRN: <[id number]>  Reason for Admission: Psoas abscess   Discharge Date: 21 RARS: Readmission Risk Score: 12         Spoke with: Called to speak with patient for update with transition of care. Left HIPPA compliant voice message with contact information [phone] for a call  Back with an update. patient called back and she is doing well she was dc'd from OT, still has PT at the moment feeling better. Walking some pain manageable. Eating and drinking well no nausea. feeing better. Has no concerns  Care Transitions Follow Up Call    Needs to be reviewed by the provider   Additional needs identified to be addressed with provider: No  none             Method of communication with provider : none      Care Transition Nurse (CTN) contacted the patient by telephone to follow up after admission on . Verified name and  with patient as identifiers. Addressed changes since last contact: none  Discussed follow-up appointments. If no appointment was previously scheduled, appointment scheduling offered: Yes. Is follow up appointment scheduled within 7 days of discharge? Yes. Advance Care Planning:   Does patient have an Advance Directive: reviewed and current, reviewed and needs to be updated, not on file; education provided, patient declined education, decision maker updated and referral to internal ACP facilitator. CTN reviewed discharge instructions, medical action plan and red flags with patient and discussed any barriers to care and/or understanding of plan of care after discharge. Discussed appropriate site of care based on symptoms and resources available to patient including: PCP, Specialist and Home health. The patient agrees to contact the PCP office for questions related to their healthcare.      Patients top risk factors for readmission: functional physical ability and lack of knowledge about disease  Interventions to address risk factors: Obtained and reviewed discharge summary and/or continuity of care documents          CTN provided contact information for future needs. Plan for follow-up call in 7-10 days based on severity of symptoms and risk factors. Plan for next call: routine          Care Transitions Subsequent and Final Call    Subsequent and Final Calls  Care Transitions Interventions  Other Interventions:            Follow Up  Future Appointments   Date Time Provider Cal Roblero   9/22/2021  9:30 AM Xochitl Oshea MD [facility]   11/10/2021  9:40 AM DO ESTELA Crandall [facility]       Clint Diop LPN

## 2021-09-10 ENCOUNTER — TELEPHONE (OUTPATIENT)
Dept: FAMILY MEDICINE CLINIC | Age: 66
End: 2021-09-10
Payer: MEDICARE

## 2021-09-10 DIAGNOSIS — Z45.2 ENCOUNTER FOR ADJUSTMENT OR MANAGEMENT OF VASCULAR ACCESS DEVICE: Primary | ICD-10-CM

## 2021-09-10 DIAGNOSIS — F41.1 GENERALIZED ANXIETY DISORDER: ICD-10-CM

## 2021-09-10 DIAGNOSIS — I11.0 HYPERTENSIVE HEART DISEASE WITH HEART FAILURE (HCC): ICD-10-CM

## 2021-09-10 DIAGNOSIS — I50.32 CHRONIC DIASTOLIC CONGESTIVE HEART FAILURE (HCC): ICD-10-CM

## 2021-09-10 DIAGNOSIS — A41.51 SEPSIS DUE TO ESCHERICHIA COLI, UNSPECIFIED WHETHER ACUTE ORGAN DYSFUNCTION PRESENT (HCC): ICD-10-CM

## 2021-09-10 DIAGNOSIS — K21.9 GASTROESOPHAGEAL REFLUX DISEASE WITHOUT ESOPHAGITIS: ICD-10-CM

## 2021-09-10 DIAGNOSIS — K59.00 CONSTIPATION, UNSPECIFIED CONSTIPATION TYPE: ICD-10-CM

## 2021-09-10 DIAGNOSIS — E78.5 HYPERLIPIDEMIA, UNSPECIFIED HYPERLIPIDEMIA TYPE: ICD-10-CM

## 2021-09-10 DIAGNOSIS — E03.9 HYPOTHYROIDISM, UNSPECIFIED TYPE: ICD-10-CM

## 2021-09-10 DIAGNOSIS — K68.12 PSOAS MUSCLE ABSCESS (HCC): ICD-10-CM

## 2021-09-10 DIAGNOSIS — M46.46 DISCITIS OF LUMBAR REGION: ICD-10-CM

## 2021-09-10 DIAGNOSIS — F32.9 MAJOR DEPRESSIVE DISORDER WITH SINGLE EPISODE, REMISSION STATUS UNSPECIFIED: ICD-10-CM

## 2021-09-10 DIAGNOSIS — E87.6 HYPOKALEMIA: ICD-10-CM

## 2021-09-10 DIAGNOSIS — E87.1 HYPO-OSMOLALITY AND HYPONATREMIA: ICD-10-CM

## 2021-09-10 DIAGNOSIS — M46.26 OSTEOMYELITIS OF VERTEBRA OF LUMBAR REGION (HCC): ICD-10-CM

## 2021-09-10 DIAGNOSIS — E66.01 MORBID (SEVERE) OBESITY DUE TO EXCESS CALORIES (HCC): ICD-10-CM

## 2021-09-10 DIAGNOSIS — Z79.2 LONG TERM CURRENT USE OF ANTIBIOTICS: ICD-10-CM

## 2021-09-10 DIAGNOSIS — M19.90 OSTEOARTHRITIS, UNSPECIFIED OSTEOARTHRITIS TYPE, UNSPECIFIED SITE: ICD-10-CM

## 2021-09-10 PROCEDURE — G0180 MD CERTIFICATION HHA PATIENT: HCPCS | Performed by: FAMILY MEDICINE

## 2021-09-10 NOTE — TELEPHONE ENCOUNTER
Home health plan of care reviewed and certification completed on patient for service dates 08/20/21 to 10/18/21. Verified current medications, allergies, diagnoses. Physician time spent on activities to coordinate service, documenting, medical decision making, review of reports/treatment plans/ test results is 15 min.

## 2021-09-13 ENCOUNTER — HOSPITAL ENCOUNTER (OUTPATIENT)
Age: 66
Setting detail: SPECIMEN
Discharge: HOME | End: 2021-09-13
Payer: MEDICARE

## 2021-09-13 LAB
ABSOLUTE EOS #: 0.1 K/UL (ref 0–0.44)
ABSOLUTE IMMATURE GRANULOCYTE: <0.03 K/UL (ref 0–0.3)
ABSOLUTE LYMPH #: 1.47 K/UL (ref 1.1–3.7)
ABSOLUTE MONO #: 0.48 K/UL (ref 0.1–1.2)
ANION GAP SERPL CALCULATED.3IONS-SCNC: 10 MMOL/L (ref 9–17)
BASOPHILS # BLD: 1 % (ref 0–2)
BASOPHILS ABSOLUTE: 0.03 K/UL (ref 0–0.2)
BUN BLDV-MCNC: 15 MG/DL (ref 8–23)
BUN/CREAT BLD: 29 (ref 9–20)
C-REACTIVE PROTEIN: 7.3 MG/L (ref 0–5)
CALCIUM SERPL-MCNC: 9.5 MG/DL (ref 8.6–10.4)
CHLORIDE BLD-SCNC: 101 MMOL/L (ref 98–107)
CO2: 26 MMOL/L (ref 20–31)
CREAT SERPL-MCNC: 0.52 MG/DL (ref 0.5–0.9)
DIFFERENTIAL TYPE: NORMAL
EOSINOPHILS RELATIVE PERCENT: 2 % (ref 1–4)
GFR AFRICAN AMERICAN: >60 ML/MIN
GFR NON-AFRICAN AMERICAN: >60 ML/MIN
GFR SERPL CREATININE-BSD FRML MDRD: ABNORMAL ML/MIN/{1.73_M2}
GFR SERPL CREATININE-BSD FRML MDRD: ABNORMAL ML/MIN/{1.73_M2}
GLUCOSE BLD-MCNC: 97 MG/DL (ref 70–99)
HCT VFR BLD CALC: 37.3 % (ref 36.3–47.1)
HEMOGLOBIN: 12.2 G/DL (ref 11.9–15.1)
IMMATURE GRANULOCYTES: 0 %
LYMPHOCYTES # BLD: 32 % (ref 24–43)
MCH RBC QN AUTO: 29.2 PG (ref 25.2–33.5)
MCHC RBC AUTO-ENTMCNC: 32.7 G/DL (ref 25.2–33.5)
MCV RBC AUTO: 89.2 FL (ref 82.6–102.9)
MONOCYTES # BLD: 11 % (ref 3–12)
NRBC AUTOMATED: 0 PER 100 WBC
PDW BLD-RTO: 14.1 % (ref 11.8–14.4)
PLATELET # BLD: 237 K/UL (ref 138–453)
PLATELET ESTIMATE: NORMAL
PMV BLD AUTO: 11.5 FL (ref 8.1–13.5)
POTASSIUM SERPL-SCNC: 4.1 MMOL/L (ref 3.7–5.3)
RBC # BLD: 4.18 M/UL (ref 3.95–5.11)
RBC # BLD: NORMAL 10*6/UL
SEG NEUTROPHILS: 54 % (ref 36–65)
SEGMENTED NEUTROPHILS ABSOLUTE COUNT: 2.48 K/UL (ref 1.5–8.1)
SODIUM BLD-SCNC: 137 MMOL/L (ref 135–144)
WBC # BLD: 4.6 K/UL (ref 3.5–11.3)
WBC # BLD: NORMAL 10*3/UL

## 2021-09-13 PROCEDURE — 85025 COMPLETE CBC W/AUTO DIFF WBC: CPT

## 2021-09-13 PROCEDURE — 86140 C-REACTIVE PROTEIN: CPT

## 2021-09-13 PROCEDURE — 80048 BASIC METABOLIC PNL TOTAL CA: CPT

## 2021-09-16 ENCOUNTER — CARE COORDINATION (OUTPATIENT)
Dept: CASE MANAGEMENT | Age: 66
End: 2021-09-16

## 2021-09-20 ENCOUNTER — HOSPITAL ENCOUNTER (OUTPATIENT)
Age: 66
Setting detail: SPECIMEN
Discharge: HOME | End: 2021-09-20
Payer: MEDICARE

## 2021-09-20 LAB
ABSOLUTE EOS #: 0.12 K/UL (ref 0–0.44)
ABSOLUTE IMMATURE GRANULOCYTE: <0.03 K/UL (ref 0–0.3)
ABSOLUTE LYMPH #: 1.38 K/UL (ref 1.1–3.7)
ABSOLUTE MONO #: 0.5 K/UL (ref 0.1–1.2)
ANION GAP SERPL CALCULATED.3IONS-SCNC: 13 MMOL/L (ref 9–17)
BASOPHILS # BLD: 1 % (ref 0–2)
BASOPHILS ABSOLUTE: 0.03 K/UL (ref 0–0.2)
BUN BLDV-MCNC: 13 MG/DL (ref 8–23)
BUN/CREAT BLD: 21 (ref 9–20)
C-REACTIVE PROTEIN: 10.8 MG/L (ref 0–5)
CALCIUM SERPL-MCNC: 9.7 MG/DL (ref 8.6–10.4)
CHLORIDE BLD-SCNC: 100 MMOL/L (ref 98–107)
CO2: 23 MMOL/L (ref 20–31)
CREAT SERPL-MCNC: 0.63 MG/DL (ref 0.5–0.9)
DIFFERENTIAL TYPE: ABNORMAL
EOSINOPHILS RELATIVE PERCENT: 2 % (ref 1–4)
GFR AFRICAN AMERICAN: >60 ML/MIN
GFR NON-AFRICAN AMERICAN: >60 ML/MIN
GFR SERPL CREATININE-BSD FRML MDRD: ABNORMAL ML/MIN/{1.73_M2}
GFR SERPL CREATININE-BSD FRML MDRD: ABNORMAL ML/MIN/{1.73_M2}
GLUCOSE BLD-MCNC: 102 MG/DL (ref 70–99)
HCT VFR BLD CALC: 38.1 % (ref 36.3–47.1)
HEMOGLOBIN: 12.3 G/DL (ref 11.9–15.1)
IMMATURE GRANULOCYTES: 0 %
LYMPHOCYTES # BLD: 26 % (ref 24–43)
MCH RBC QN AUTO: 29 PG (ref 25.2–33.5)
MCHC RBC AUTO-ENTMCNC: 32.3 G/DL (ref 25.2–33.5)
MCV RBC AUTO: 89.9 FL (ref 82.6–102.9)
MONOCYTES # BLD: 9 % (ref 3–12)
NRBC AUTOMATED: 0 PER 100 WBC
PDW BLD-RTO: 14.6 % (ref 11.8–14.4)
PLATELET # BLD: 231 K/UL (ref 138–453)
PLATELET ESTIMATE: ABNORMAL
PMV BLD AUTO: 11.7 FL (ref 8.1–13.5)
POTASSIUM SERPL-SCNC: 4.1 MMOL/L (ref 3.7–5.3)
RBC # BLD: 4.24 M/UL (ref 3.95–5.11)
RBC # BLD: ABNORMAL 10*6/UL
SEG NEUTROPHILS: 62 % (ref 36–65)
SEGMENTED NEUTROPHILS ABSOLUTE COUNT: 3.36 K/UL (ref 1.5–8.1)
SODIUM BLD-SCNC: 136 MMOL/L (ref 135–144)
WBC # BLD: 5.4 K/UL (ref 3.5–11.3)
WBC # BLD: ABNORMAL 10*3/UL

## 2021-09-20 PROCEDURE — 85025 COMPLETE CBC W/AUTO DIFF WBC: CPT

## 2021-09-20 PROCEDURE — 86140 C-REACTIVE PROTEIN: CPT

## 2021-09-20 PROCEDURE — 80048 BASIC METABOLIC PNL TOTAL CA: CPT

## 2021-09-21 ENCOUNTER — TELEPHONE (OUTPATIENT)
Dept: INFECTIOUS DISEASES | Age: 66
End: 2021-09-21

## 2021-09-21 NOTE — TELEPHONE ENCOUNTER
Ashlee Michel is a nurse from [address] [place] [address]. She was wondering if the patients IV ABX end date was for 9/22 or 9/24. Also, when her IV ABX are completed, is it ok to pull the PICC line?

## 2021-09-22 ENCOUNTER — VIRTUAL VISIT (OUTPATIENT)
Dept: INFECTIOUS DISEASES | Age: 66
End: 2021-09-22
Payer: MEDICARE

## 2021-09-22 DIAGNOSIS — M46.26 OSTEOMYELITIS OF LUMBAR SPINE (HCC): Primary | ICD-10-CM

## 2021-09-22 DIAGNOSIS — A41.51 SEPSIS DUE TO ESCHERICHIA COLI WITHOUT ACUTE ORGAN DYSFUNCTION (HCC): ICD-10-CM

## 2021-09-22 PROCEDURE — 99442 PR PHYS/QHP TELEPHONE EVALUATION 11-20 MIN: CPT | Performed by: INTERNAL MEDICINE

## 2021-09-22 RX ORDER — CEFTRIAXONE 2 G/1
INJECTION, POWDER, FOR SOLUTION INTRAMUSCULAR; INTRAVENOUS
COMMUNITY
Start: 2021-09-13 | End: 2021-10-20 | Stop reason: ALTCHOICE
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
## 2021-09-22 NOTE — PROGRESS NOTES
Armando Neal is a 78 y.o. female evaluated via telephone on 9/22/2021. Consent:  She and/or health care decision maker is aware that that she may receive a bill for this telephone service, depending on her insurance coverage, and has provided verbal consent to proceed: Yes      Documentation:  I communicated with the patient and/or health care decision maker about the E. coli sepsis, psoas abscess and osteomyelitis. Details of this discussion including any medical advice provided:     Shu Ontiveros was recently hospitalized with E. coli sepsis while in [place] and was diagnosed with L3-L4 osteomyelitis at that time status post bone biopsy and the culture had E. coli as well. The patient was admitted to [facility] with a left-sided psoas abscess at L4-L5 underwent IR drainage 8/18/2021. She does also have morbid obesity, hypertension and chronic back pain. The patient was discharged home on intravenous antimicrobial therapy with Rocephin to complete a 6-week course which was scheduled to be done on 9/25/2021    I called her today to assess how she was doing and she reports she has improved back pain and is ambulating very well with no fevers or chills. She does still have episodes where she does have some pain but she is now ambulating and is markedly improved from when she was hospitalized. We discussed that she will need to have repeat blood cultures done once her IV therapy is complete. The blood cultures will be done in 1 week. The patient did ask about having follow-up imaging but given that her clinical symptoms are improved I discussed with her that follow-up imaging typically looks worse especially in terms of the osteomyelitis as the changes do involve during treatment.     Given that I expect worsening MRI changes of the lumbar spine and that she is doing much better clinically the imaging would not change anything at this point in time unless she has worsening symptoms. The patient will otherwise follow-up with me in about 4 weeks and we will set her up for a virtual visit which was how the splint was supposed to be set up. I affirm this is a Patient Initiated Episode with a Patient who has not had a related appointment within my department in the past 7 days or scheduled within the next 24 hours. Patient identification was verified at the start of the visit: Yes    Total Time: minutes: 11-20 minutes    The visit was conducted pursuant to the emergency declaration under the [address] [place] [address], [address] [place] [address] and the [employer] Act. Patient identification was verified, and a caregiver was present when appropriate. The patient was located in a state where the provider was credentialed to provide care.     Note: not billable if this call serves to triage the patient into an appointment for the relevant concern      Jv Pinto MD

## 2021-09-22 NOTE — TELEPHONE ENCOUNTER
The patient told me this morning that she has antibiotics until 9/22 so it is okay to discontinue the IVs and pulled the PICC line

## 2021-09-23 ENCOUNTER — TELEPHONE (OUTPATIENT)
Dept: FAMILY MEDICINE CLINIC | Age: 66
End: 2021-09-23

## 2021-09-23 NOTE — TELEPHONE ENCOUNTER
Patient calling stating she has a yeast infection and this has been a recurring problem for her. Patient stated she has been using OTC yeast infection medication with minimal relief. Patient asking if she can get something else or if there is anything she can do with out being seen. Patient denies any odor or drainage, but states it itches in her vaginal area throughout the day. Please advise. Patient is also doing an iv antibiotic for her e.coli sepsis.

## 2021-09-29 ENCOUNTER — HOSPITAL ENCOUNTER (OUTPATIENT)
Dept: LAB | Age: 66
Discharge: HOME | End: 2021-09-29
Payer: MEDICARE

## 2021-09-29 DIAGNOSIS — A41.51 SEPSIS DUE TO ESCHERICHIA COLI WITHOUT ACUTE ORGAN DYSFUNCTION (HCC): ICD-10-CM

## 2021-09-29 DIAGNOSIS — M46.26 OSTEOMYELITIS OF LUMBAR SPINE (HCC): ICD-10-CM

## 2021-09-29 PROCEDURE — 36415 COLL VENOUS BLD VENIPUNCTURE: CPT

## 2021-09-29 PROCEDURE — 87040 BLOOD CULTURE FOR BACTERIA: CPT

## 2021-09-30 NOTE — TELEPHONE ENCOUNTER
Spoke with patient. Patient states she is off the IV antibiotics and has been using OTC medication for the yeast infection. Patient states she has been eating yogurt as well to help with the yeast infection. Patient sates at this time she does not need a prescription for the yeast infection and that it has mostly cleared up. Writer educated patient on avoiding baths, perfume smelling soaps, and tight fitting clothing. Patient verbalized understanding. Informed patient to call back if symptoms return. Patient verbalized understanding.

## 2021-09-30 NOTE — TELEPHONE ENCOUNTER
Please call pt and apologize that I did not see this message previously. Is she still having sx's of vaginal itching? If so, I will send in Diflucan 150 mg x 2 doses to see if this helps.

## 2021-10-05 LAB
CULTURE: NORMAL
CULTURE: NORMAL
Lab: NORMAL
Lab: NORMAL
SPECIMEN DESCRIPTION: NORMAL
SPECIMEN DESCRIPTION: NORMAL

## 2021-10-07 ENCOUNTER — TELEPHONE (OUTPATIENT)
Dept: FAMILY MEDICINE CLINIC | Age: 66
End: 2021-10-07

## 2021-10-07 NOTE — TELEPHONE ENCOUNTER
Patient called requesting letter from PCP that when she was transported by squad while in [place] in July, that patient spoke with PCP via phone and it was the PCP's decision to have the squad transport patient to hospital. Patient stated per her insurance, she needs a letter from pcp about this for insurance coverage. Please advise. See telephone encounter 8/3/2021.

## 2021-10-20 ENCOUNTER — VIRTUAL VISIT (OUTPATIENT)
Dept: INFECTIOUS DISEASES | Age: 66
End: 2021-10-20
Payer: MEDICARE

## 2021-10-20 DIAGNOSIS — K68.12 PSOAS ABSCESS (HCC): ICD-10-CM

## 2021-10-20 DIAGNOSIS — G89.29 CHRONIC BILATERAL LOW BACK PAIN, UNSPECIFIED WHETHER SCIATICA PRESENT: ICD-10-CM

## 2021-10-20 DIAGNOSIS — M54.50 CHRONIC BILATERAL LOW BACK PAIN, UNSPECIFIED WHETHER SCIATICA PRESENT: ICD-10-CM

## 2021-10-20 DIAGNOSIS — M46.26 OSTEOMYELITIS OF LUMBAR SPINE (HCC): Primary | ICD-10-CM

## 2021-10-20 DIAGNOSIS — A41.51 SEPSIS DUE TO ESCHERICHIA COLI WITHOUT ACUTE ORGAN DYSFUNCTION (HCC): ICD-10-CM

## 2021-10-20 PROCEDURE — 1123F ACP DISCUSS/DSCN MKR DOCD: CPT | Performed by: INTERNAL MEDICINE

## 2021-10-20 PROCEDURE — 4040F PNEUMOC VAC/ADMIN/RCVD: CPT | Performed by: INTERNAL MEDICINE

## 2021-10-20 PROCEDURE — 3017F COLORECTAL CA SCREEN DOC REV: CPT | Performed by: INTERNAL MEDICINE

## 2021-10-20 PROCEDURE — 1090F PRES/ABSN URINE INCON ASSESS: CPT | Performed by: INTERNAL MEDICINE

## 2021-10-20 PROCEDURE — 99214 OFFICE O/P EST MOD 30 MIN: CPT | Performed by: INTERNAL MEDICINE

## 2021-10-20 PROCEDURE — G8427 DOCREV CUR MEDS BY ELIG CLIN: HCPCS | Performed by: INTERNAL MEDICINE

## 2021-10-20 PROCEDURE — G8400 PT W/DXA NO RESULTS DOC: HCPCS | Performed by: INTERNAL MEDICINE

## 2021-10-20 ASSESSMENT — ENCOUNTER SYMPTOMS
BACK PAIN: 1
GASTROINTESTINAL NEGATIVE: 1
RESPIRATORY NEGATIVE: 1

## 2021-10-20 NOTE — PATIENT INSTRUCTIONS
Pt was left a message on voicemail to give the office a call back In order to find out where MRI will be be completed.  The office phone number was given DJ

## 2021-10-20 NOTE — PROGRESS NOTES
10/20/2021    TELEHEALTH EVALUATION -- Audio/Visual (During AYCCN-36 public health emergency)    [facility]  Today's Date and Time: 10/20/2021, 3:08 PM    Chief complaint/reason for consultation:   Willie Roberts (:  1955) has requested an audio/video evaluation for the following concern(s):    Follow-up for E. coli sepsis, L3-L4 lumbar spine osteomyelitis, and associated psoas abscess. History of Present Illness:   Wilile Roberts is a 78y.o.-year-old female who has a history of morbid obesity, hypertension and chronic back pain. Nancy Spicer was recently hospitalized with E. coli sepsis while in [place] and was diagnosed with L3-L4 osteomyelitis at that time status post bone biopsy and the culture had E. coli as well. The patient was admitted to [facility] with a left-sided psoas abscess at L4-L5 underwent IR drainage 2021. She has now since completed a 6-week course of IV antimicrobial therapy with Rocephin 2021. She is otherwise doing well does not report any abdominal pain, nausea vomiting or diarrhea. She does report that she has some back pain especially when she gets up to walk and she also reports some aching pain at times in the arm where the PICC line was removed. I have personally reviewedthe past medical history, medications, social history, and I have updated the database accordingly.   Past Medical History:     Past Medical History:   Diagnosis Date    Chronic back pain     Depression     GERD (gastroesophageal reflux disease)     Hyperlipidemia     Hypertension     Morbid obesity (HCC)      Medications:     Current Outpatient Medications   Medication Sig Dispense Refill    levothyroxine (SYNTHROID) 25 MCG tablet Take 25 mcg by mouth Daily       atorvastatin (LIPITOR) 20 MG tablet TAKE 1 TABLET DAILY 90 tablet 3    buPROPion (WELLBUTRIN XL) 300 MG extended release tablet TAKE 1 TABLET EVERY MORNING 90 tablet 3    losartan (COZAAR) 25 MG tablet TAKE 1 TABLET DAILY 90 tablet 3    atenolol (TENORMIN) 50 MG tablet TAKE 1 TABLET DAILY 90 tablet 3    Scooter MISC Use daily as needed. 1 each 0    furosemide (LASIX) 20 MG tablet TAKE 1 TABLET DAILY 90 tablet 3     No current facility-administered medications for this visit. Allergies:   Benazepril, Dynabac [dirithromycin], Levothyroxine, Lisinopril, and Other     Review of Systems:   Review of Systems   Constitutional: Negative. Respiratory: Negative. Cardiovascular: Negative. Gastrointestinal: Negative. Genitourinary: Negative. Musculoskeletal: Positive for back pain. Skin: Negative. Neurological: Negative. Psychiatric/Behavioral: Negative. Physical Examination :   There were no vitals taken for this visit. PHYSICAL EXAMINATION:  [ INSTRUCTIONS:  \"[x]\" Indicates a positive item  \"[]\" Indicates a negative item]  Vital Signs: (As obtained by patient/caregiver or practitioner observation)    Blood pressure-  Heart rate-    Respiratory rate-    Temperature-  Pulse oximetry-     Constitutional: [x] Appears well-developed and well-nourished [x] No apparent distress      [] Abnormal-   Mental status  [x] Alert and awake  [x] Oriented to person/place/time [x]Able to follow commands      Eyes:  EOM    [x]  Normal  [] Abnormal-  Sclera  [x]  Normal  [] Abnormal -         Discharge []  None visible  [] Abnormal -    HENT:   [x] Normocephalic, atraumatic.   [] Abnormal   [x] Mouth/Throat: Mucous membranes are moist.     External Ears [x] Normal  [] Abnormal-     Neck: [x] No visualized mass     Pulmonary/Chest: [x] Respiratory effort normal.  [x] No visualized signs of difficulty breathing or respiratory distress        [] Abnormal-      Musculoskeletal:   [] Normal gait with no signs of ataxia         [x] Normal range of motion of neck        [] Abnormal-       Neurological:        [x] No Facial Asymmetry (Cranial nerve 7 motor function) (limited exam to video visit)          [x] No gaze palsy        [] Abnormal-         Skin:        [x] No significant exanthematous lesions or discoloration noted on facial skin         [] Abnormal-            Psychiatric:       [x] Normal Affect [x] No Hallucinations        [] Abnormal-     Other pertinent observable physical exam findings-     Laboratory studies :  Medical Decision Making:   I have independently reviewed the following labs:  CBC with Differential:  Lab Results   Component Value Date    WBC 5.4 09/20/2021    WBC 4.6 09/13/2021    HGB 12.3 09/20/2021    HGB 12.2 09/13/2021    HCT 38.1 09/20/2021    HCT 37.3 09/13/2021     09/20/2021     09/13/2021    LYMPHOPCT 26 09/20/2021    LYMPHOPCT 32 09/13/2021    MONOPCT 9 09/20/2021    MONOPCT 11 09/13/2021       BMP:  Lab Results   Component Value Date     09/20/2021     09/13/2021    K 4.1 09/20/2021    K 4.1 09/13/2021     09/20/2021     09/13/2021    CO2 23 09/20/2021    CO2 26 09/13/2021    BUN 13 09/20/2021    BUN 15 09/13/2021    CREATININE 0.63 09/20/2021    CREATININE 0.52 09/13/2021       Hepatic Function Panel:   Lab Results   Component Value Date    PROT 5.7 08/19/2021    PROT 6.5 08/18/2021    LABALBU 2.7 08/19/2021    LABALBU 2.7 08/18/2021    BILIDIR 0.12 08/17/2021    IBILI 0.43 08/17/2021    BILITOT 0.30 08/19/2021    BILITOT 0.48 08/18/2021    ALKPHOS 82 08/19/2021    ALKPHOS 98 08/18/2021    ALT 23 08/19/2021    ALT 23 08/18/2021    AST 30 08/19/2021    AST 29 08/18/2021       Lab Results   Component Value Date    CRP 10.8 (H) 09/20/2021     Lab Results   Component Value Date    SEDRATE 81 (H) 08/18/2021       Cultures:     BLOOD lt hand total 5ml    Special Requests 09/29/2021  2:30 PM [facility]   NOT REPORTED    Culture 09/29/2021  2:30  [address]   NO GROWTH 6 DAYS      . BLOOD lt forarm total 5ml    Special Requests 09/29/2021  2:24 PM [facility]- [facility]   NOT REPORTED    Culture 09/29/2021  2:24  [address]   NO GROWTH 6 DAYS          Impression:     1. Osteomyelitis of lumbar spine ([address]    2. Sepsis due to Escherichia coli without acute organ dysfunction (HCC)    3. Psoas abscess ([address]    4. Chronic bilateral low back pain, unspecified whether sciatica present         Recommendations   · The patient did have follow-up blood cultures that showed resolution of the bacteremia/sepsis. · The patient does still have residual back pain especially with movement. · At this point in time I will order an MRI of the lumbar spine with and without contrast to follow-up for the L3-L4 osteomyelitis as well as to evaluate for the psoas abscess. · The fact that she still has pain could be related to the underlying chronic pain but the patient could also have residual vertebrae damage related to the prior infection. · I will call and make further recommendations once the MRI has been resulted    I have ordered the following medications/ labs:  Orders Placed This Encounter   Procedures    MRI LUMBAR SPINE W WO CONTRAST     Standing Status:   Future     Standing Expiration Date:   4/20/2022     Order Specific Question:   Reason for exam:     Answer:   Recent L3-L4 osteomyelitis status post antibiotic course with persistent back pain    Creatinine, Serum     Standing Status:   Future     Standing Expiration Date:   10/20/2022      No orders of the defined types were placed in this encounter. No follow-ups on file. Charleen Santiago is a 78 y.o. female being evaluated by a Virtual Visit (video visit) encounter to address concerns as mentioned above. A caregiver was present when appropriate. Due to this being a TeleHealth encounter (During [facility] public health emergency), evaluation of the following organ systems was limited: Vitals/Constitutional/EENT/Resp/CV/GI//MS/Neuro/Skin/Heme-Lymph-Imm.     Pursuant to the emergency declaration under the [address] Emergencies Act, 1135 waiver authority and the Coronavirus Preparedness and Response Supplemental Appropriations Act, this Virtual Visit was conducted with patient's (and/or legal guardian's) consent, to reduce the patient's risk of exposure to COVID-19 and provide necessary medical care. The patient (and/or legal guardian) has also been advised to contact this office for worsening conditions or problems, and seek emergency medical treatment and/or call 911 if deemed necessary. Services were provided through a video synchronous discussion virtually to substitute for in-person clinic visit. Patient and provider were located at their individual homes. --Jan Romero MD on 10/20/2021 at 3:08 PM    An electronic signature was used to authenticate this note.

## 2021-10-26 ENCOUNTER — HOSPITAL ENCOUNTER (OUTPATIENT)
Dept: LAB | Age: 66
Discharge: HOME | End: 2021-10-26
Payer: MEDICARE

## 2021-10-26 DIAGNOSIS — M46.26 OSTEOMYELITIS OF LUMBAR SPINE (HCC): ICD-10-CM

## 2021-10-26 LAB
CREAT SERPL-MCNC: 0.71 MG/DL (ref 0.5–0.9)
GFR AFRICAN AMERICAN: >60 ML/MIN
GFR NON-AFRICAN AMERICAN: >60 ML/MIN
GFR SERPL CREATININE-BSD FRML MDRD: NORMAL ML/MIN/{1.73_M2}
GFR SERPL CREATININE-BSD FRML MDRD: NORMAL ML/MIN/{1.73_M2}

## 2021-10-26 PROCEDURE — 82565 ASSAY OF CREATININE: CPT

## 2021-10-26 PROCEDURE — 36415 COLL VENOUS BLD VENIPUNCTURE: CPT

## 2021-10-27 ENCOUNTER — HOSPITAL ENCOUNTER (OUTPATIENT)
Dept: MRI IMAGING | Age: 66
Discharge: HOME | End: 2021-10-29
Payer: MEDICARE

## 2021-10-27 ENCOUNTER — TELEPHONE (OUTPATIENT)
Dept: FAMILY MEDICINE CLINIC | Age: 66
End: 2021-10-27

## 2021-10-27 DIAGNOSIS — M19.90 OSTEOARTHRITIS, UNSPECIFIED OSTEOARTHRITIS TYPE, UNSPECIFIED SITE: ICD-10-CM

## 2021-10-27 DIAGNOSIS — G89.29 CHRONIC BILATERAL LOW BACK PAIN, UNSPECIFIED WHETHER SCIATICA PRESENT: ICD-10-CM

## 2021-10-27 DIAGNOSIS — K59.00 CONSTIPATION, UNSPECIFIED CONSTIPATION TYPE: ICD-10-CM

## 2021-10-27 DIAGNOSIS — E66.01 MORBID (SEVERE) OBESITY DUE TO EXCESS CALORIES (HCC): ICD-10-CM

## 2021-10-27 DIAGNOSIS — F41.1 GENERALIZED ANXIETY DISORDER: ICD-10-CM

## 2021-10-27 DIAGNOSIS — E87.6 HYPOKALEMIA: ICD-10-CM

## 2021-10-27 DIAGNOSIS — Z45.2 ENCOUNTER FOR ADJUSTMENT OR MANAGEMENT OF VASCULAR ACCESS DEVICE: Primary | ICD-10-CM

## 2021-10-27 DIAGNOSIS — M54.50 CHRONIC BILATERAL LOW BACK PAIN, UNSPECIFIED WHETHER SCIATICA PRESENT: ICD-10-CM

## 2021-10-27 DIAGNOSIS — M46.46 DISCITIS, UNSPECIFIED, LUMBAR REGION: ICD-10-CM

## 2021-10-27 DIAGNOSIS — E87.1 HYPO-OSMOLALITY AND HYPONATREMIA: ICD-10-CM

## 2021-10-27 DIAGNOSIS — M46.26 OSTEOMYELITIS OF LUMBAR SPINE (HCC): ICD-10-CM

## 2021-10-27 DIAGNOSIS — K68.12 PSOAS ABSCESS (HCC): ICD-10-CM

## 2021-10-27 DIAGNOSIS — E03.9 HYPOTHYROIDISM, UNSPECIFIED TYPE: ICD-10-CM

## 2021-10-27 DIAGNOSIS — Z79.2 LONG TERM CURRENT USE OF ANTIBIOTICS: ICD-10-CM

## 2021-10-27 DIAGNOSIS — I10 ESSENTIAL (PRIMARY) HYPERTENSION: ICD-10-CM

## 2021-10-27 DIAGNOSIS — A41.51 SEPSIS DUE TO ESCHERICHIA COLI, UNSPECIFIED WHETHER ACUTE ORGAN DYSFUNCTION PRESENT (HCC): ICD-10-CM

## 2021-10-27 DIAGNOSIS — M46.26 OSTEOMYELITIS OF VERTEBRA OF LUMBAR REGION (HCC): ICD-10-CM

## 2021-10-27 DIAGNOSIS — F32.9 MAJOR DEPRESSIVE DISORDER WITH SINGLE EPISODE, REMISSION STATUS UNSPECIFIED: ICD-10-CM

## 2021-10-27 DIAGNOSIS — E78.5 HYPERLIPIDEMIA, UNSPECIFIED HYPERLIPIDEMIA TYPE: ICD-10-CM

## 2021-10-27 PROCEDURE — 2709999900 MRI LUMBAR SPINE W WO CONTRAST

## 2021-10-27 PROCEDURE — 6360000004 HC RX CONTRAST MEDICATION: Performed by: INTERNAL MEDICINE

## 2021-10-27 PROCEDURE — A9579 GAD-BASE MR CONTRAST NOS,1ML: HCPCS | Performed by: INTERNAL MEDICINE

## 2021-10-27 RX ADMIN — GADOTERIDOL 15 ML: 279.3 INJECTION, SOLUTION INTRAVENOUS at 10:13

## 2021-10-27 NOTE — TELEPHONE ENCOUNTER
Home health plan of care reviewed and certification completed on patient for service dates 08/10/21 to 10/08/21. Verified current medications, allergies, diagnoses. Physician time spent on activities to coordinate service, documenting, medical decision making, review of reports/treatment plans/ test results is 15 min.

## 2021-10-29 ENCOUNTER — TELEPHONE (OUTPATIENT)
Dept: PULMONOLOGY | Age: 66
End: 2021-10-29

## 2021-10-29 NOTE — TELEPHONE ENCOUNTER
This is care plan with similar/overlapping dates with one that was already billed for; pt had been admitted to the hospital and was then re-admitted to home care. Home health certification reviewed and signed.

## 2021-11-01 DIAGNOSIS — F32.A DEPRESSION, UNSPECIFIED DEPRESSION TYPE: ICD-10-CM

## 2021-11-01 DIAGNOSIS — I10 ESSENTIAL HYPERTENSION: ICD-10-CM

## 2021-11-01 DIAGNOSIS — E03.9 HYPOTHYROIDISM, UNSPECIFIED TYPE: Primary | ICD-10-CM

## 2021-11-01 NOTE — TELEPHONE ENCOUNTER
Pati Dan called requesting a refill of the below medication which has been pended for you:     Requested Prescriptions     Pending Prescriptions Disp Refills    buPROPion (WELLBUTRIN XL) 300 MG extended release tablet [Pharmacy Med Name: BUPROPION HCL XL TABS 300MG] 90 tablet 3     Sig: TAKE 1 TABLET EVERY MORNING    atenolol (TENORMIN) 50 MG tablet [Pharmacy Med Name: ATENOLOL TABS 50MG] 90 tablet 3     Sig: TAKE 1 TABLET DAILY    losartan (COZAAR) 25 MG tablet [Pharmacy Med Name: LOSARTAN TABS 25MG] 90 tablet 3     Sig: TAKE 1 TABLET DAILY    levothyroxine (SYNTHROID) 25 MCG tablet [Pharmacy Med Name: L-THYROXINE (SYNTHROID) TABS 25MCG] 90 tablet 3     Sig: TAKE 1 TABLET DAILY FIRST THING IN THE MORNING, ON AN EMPTY STOMACH, 30 MINUTES PRIOR TO OTHER MEDICATIONS OR FOOD       Last Appointment Date: 9/8/2021  Next Appointment Date: 11/10/2021    Allergies   Allergen Reactions    Benazepril Other (See Comments)     cough    Dynabac [Dirithromycin]     Levothyroxine Other (See Comments)     Caused leg swelling, headache, insomnia, nausea.     Lisinopril Other (See Comments)     Cough      Other      Generic Prozac

## 2021-11-03 RX ORDER — LOSARTAN POTASSIUM 25 MG/1
TABLET ORAL
Qty: 90 TABLET | Refills: 3 | Status: SHIPPED | OUTPATIENT
Start: 2021-11-03

## 2021-11-03 RX ORDER — BUPROPION HYDROCHLORIDE 300 MG/1
TABLET ORAL
Qty: 90 TABLET | Refills: 3 | Status: SHIPPED | OUTPATIENT
Start: 2021-11-03

## 2021-11-03 RX ORDER — LEVOTHYROXINE SODIUM 0.03 MG/1
TABLET ORAL
Qty: 90 TABLET | Refills: 0 | Status: SHIPPED | OUTPATIENT
Start: 2021-11-03 | End: 2022-01-31

## 2021-11-03 RX ORDER — ATENOLOL 50 MG/1
TABLET ORAL
Qty: 90 TABLET | Refills: 3 | Status: SHIPPED | OUTPATIENT
Start: 2021-11-03

## 2021-11-03 NOTE — TELEPHONE ENCOUNTER
Patient called, would like a call back about her MRI results? If you call patient today #[phone]. If tomorrow after 4pm #[phone].

## 2021-11-10 ENCOUNTER — TELEPHONE (OUTPATIENT)
Dept: FAMILY MEDICINE CLINIC | Age: 66
End: 2021-11-10

## 2021-11-10 NOTE — TELEPHONE ENCOUNTER
Patient arrived for appt at 10:45am. When informed that appt was scheduled for 9:40am, patient stated appt was supposed to be for 10:30am. Informed patient that they were late for appt and would not be seen today. Did not wish to reschedule at this time.

## 2021-11-11 ENCOUNTER — TELEPHONE (OUTPATIENT)
Dept: INFECTIOUS DISEASES | Age: 66
End: 2021-11-11

## 2021-11-11 DIAGNOSIS — M46.26 OSTEOMYELITIS OF LUMBAR SPINE (HCC): Primary | ICD-10-CM

## 2021-11-11 NOTE — TELEPHONE ENCOUNTER
I called Sharlene and we discussed her MRI results. The patient is still having some back pain but overall she is doing okay. The pain is not worsening and she does not report any numbness weakness urinary symptoms. I did discuss that the MRI still suggests an infectious process with paraspinal changes and therefore I recommended that she be seen by a neurosurgeon.   She wanted to be referred to a neurosurgeon here in town and I will put in the referral.  I have asked her to continue to monitor her progress as she remains off antimicrobial therapy and clinically has remained stable  Yennifer Stacy MD

## 2021-11-23 ENCOUNTER — VIRTUAL VISIT (OUTPATIENT)
Dept: FAMILY MEDICINE CLINIC | Age: 66
End: 2021-11-23
Payer: MEDICARE

## 2021-11-23 DIAGNOSIS — F41.9 ANXIETY: ICD-10-CM

## 2021-11-23 DIAGNOSIS — Z00.00 ROUTINE GENERAL MEDICAL EXAMINATION AT A HEALTH CARE FACILITY: Primary | ICD-10-CM

## 2021-11-23 PROCEDURE — 99211 OFF/OP EST MAY X REQ PHY/QHP: CPT

## 2021-11-23 PROCEDURE — G8484 FLU IMMUNIZE NO ADMIN: HCPCS | Performed by: FAMILY MEDICINE

## 2021-11-23 PROCEDURE — G0439 PPPS, SUBSEQ VISIT: HCPCS | Performed by: FAMILY MEDICINE

## 2021-11-23 PROCEDURE — 4040F PNEUMOC VAC/ADMIN/RCVD: CPT | Performed by: FAMILY MEDICINE

## 2021-11-23 PROCEDURE — 1123F ACP DISCUSS/DSCN MKR DOCD: CPT | Performed by: FAMILY MEDICINE

## 2021-11-23 PROCEDURE — 3017F COLORECTAL CA SCREEN DOC REV: CPT | Performed by: FAMILY MEDICINE

## 2021-11-23 RX ORDER — LORAZEPAM 1 MG/1
1 TABLET ORAL DAILY PRN
Qty: 30 TABLET | Refills: 0 | Status: SHIPPED | OUTPATIENT
Start: 2021-11-23 | End: 2021-12-23

## 2021-11-23 ASSESSMENT — PATIENT HEALTH QUESTIONNAIRE - PHQ9
SUM OF ALL RESPONSES TO PHQ QUESTIONS 1-9: 0
SUM OF ALL RESPONSES TO PHQ9 QUESTIONS 1 & 2: 0
2. FEELING DOWN, DEPRESSED OR HOPELESS: 0
1. LITTLE INTEREST OR PLEASURE IN DOING THINGS: 0
SUM OF ALL RESPONSES TO PHQ QUESTIONS 1-9: 0
SUM OF ALL RESPONSES TO PHQ QUESTIONS 1-9: 0

## 2021-11-23 ASSESSMENT — LIFESTYLE VARIABLES
HOW OFTEN DO YOU HAVE A DRINK CONTAINING ALCOHOL: NEVER
AUDIT TOTAL SCORE: INCOMPLETE
AUDIT-C TOTAL SCORE: INCOMPLETE
HOW OFTEN DO YOU HAVE A DRINK CONTAINING ALCOHOL: 0

## 2021-11-23 NOTE — PATIENT INSTRUCTIONS
Personalized Preventive Plan for Gavin Aragon - 11/23/2021  Medicare offers a range of preventive health benefits. Some of the tests and screenings are paid in full while other may be subject to a deductible, co-insurance, and/or copay. Some of these benefits include a comprehensive review of your medical history including lifestyle, illnesses that may run in your family, and various assessments and screenings as appropriate. After reviewing your medical record and screening and assessments performed today your provider may have ordered immunizations, labs, imaging, and/or referrals for you. A list of these orders (if applicable) as well as your Preventive Care list are included within your After Visit Summary for your review. Other Preventive Recommendations:    · A preventive eye exam performed by an eye specialist is recommended every 1-2 years to screen for glaucoma; cataracts, macular degeneration, and other eye disorders. · A preventive dental visit is recommended every 6 months. · Try to get at least 150 minutes of exercise per week or 10,000 steps per day on a pedometer . · Order or download the FREE \"Exercise & Physical Activity: Your Everyday Guide\" from [employer]. Call 6-366.570.1848 or search [employer] online. · You need 5597-9686 mg of calcium and 1436-2540 IU of vitamin D per day. It is possible to meet your calcium requirement with diet alone, but a vitamin D supplement is usually necessary to meet this goal.  · When exposed to the sun, use a sunscreen that protects against both UVA and UVB radiation with an SPF of 30 or greater. Reapply every 2 to 3 hours or after sweating, drying off with a towel, or swimming. · Always wear a seat belt when traveling in a car. Always wear a helmet when riding a bicycle or motorcycle.

## 2021-11-23 NOTE — PROGRESS NOTES
COLONOSCOPY  2005    CT ABSCESS DRAIN SUBCUTANEOUS  8/18/2021    CT ABSCESS DRAIN SUBCUTANEOUS 8/18/2021 [facility] CT SCAN    CYSTOSCOPY  2005    HEMORRHOID SURGERY      HYSTERECTOMY  1984    partial - both ovaries remain; done for uterine fibroid    OTHER SURGICAL HISTORY      fissure repair         Family History   Problem Relation Age of Onset    Heart Disease Father     Heart Disease Brother         MI    Cancer Brother         stomach or prostate    Heart Disease Brother         CABG in his late 40's/early 52's    High Blood Pressure Mother     Cancer Maternal Grandmother         breast    High Blood Pressure Sister     Diabetes Paternal Grandmother     Lupus Other         Niece       CareTeam (Including outside providers/suppliers regularly involved in providing care):   Patient Care Team:  Judy Marte DO as PCP - General (Family Medicine)  Judy Marte DO as PCP - [facility] Andrade Sanchezled Provider    Wt Readings from Last 3 Encounters:   09/08/21 259 lb 3.2 oz (117.6 kg)   08/25/21 261 lb (118.4 kg)   08/18/21 270 lb (122.5 kg)     Patient-Reported Vitals 11/23/2021   Patient-Reported Weight 263#   Patient-Reported Height 5f2i   Patient-Reported Systolic -   Patient-Reported Diastolic -   Patient-Reported Temperature -          Based upon direct observation of the patient, evaluation of cognition reveals recent and remote memory intact. Patient's complete Health Risk Assessment and screening values have been reviewed and are found in Flowsheets. The following problems were reviewed today and where indicated follow up appointments were made and/or referrals ordered.     Positive Risk Factor Screenings with Interventions:     Fall Risk:  2 or more falls in past year?: no  Fall with injury in past year?: (!) yes  Fall Risk Interventions:    · Pt had fall in July/Aug of this year in [place], dx of osteomyelitis at that time, still recovering       Substance History:  Social History     Tobacco History Smoking Status  Current Some Day Smoker Smoking Start Date  7/31/2014 Last attempt to quit  4/25/2016 Smoking Frequency  0.25 packs/day for 40 years (10 pk yrs)    Smoking Tobacco Type  Cigarettes    Smokeless Tobacco Use  Never Used          Alcohol History     Alcohol Use Status  Yes Drinks/Week  0 Standard drinks or equivalent per week Amount  0.0 standard drinks of alcohol/wk Comment  social          Drug Use     Drug Use Status  Not Asked          Sexual Activity     Sexually Active  Not Currently               Alcohol Screening:       A score of 8 or more is associated with harmful or hazardous drinking. A score of 13 or more in women, and 15 or more in men, is likely to indicate alcohol dependence. Substance Abuse Interventions:  · Pt reports not drinking alcohol or using recreational drugs or smoking    General Health and ACP:  General  In general, how would you say your health is?: (!) Poor  In the past 7 days, have you experienced any of the following? New or Increased Pain, New or Increased Fatigue, Loneliness, Social Isolation, Stress or Anger?: (!) Stress  Do you get the social and emotional support that you need?: Yes  Do you have a Living Will?: Yes  Advance Directives     Power of  Living Will ACP-Advance Directive ACP-Power of     Not on File Not on File Not on File Not on File      General Health Risk Interventions:  · Poor self-assessment of health status: patient declines any further evaluation/treatment for this issue, related to being unable to walk due to on-going back issues. Has appointment with neurosurgery in December, will need surgery again for infection, having some pain with this but nothing new or increased. Has been having some new stress due to granddaughter health status. Requesting refill of stress medication from Dr. Leilani Reich. See refill request from 11/23/21.      Health Habits/Nutrition:  Health Habits/Nutrition  Do you exercise for at least 20 minutes 2-3 times screen  07/10/2020    Annual Wellness Visit (AWV)  09/04/2020    COVID-19 Vaccine (2 - Pfizer 3-dose booster series) 06/22/2021    Flu vaccine (1) Never done    Pneumococcal 65+ years Vaccine (2 of 2 - PPSV23) 10/08/2021    Lipid screen  12/23/2021    Potassium monitoring  09/20/2022    Creatinine monitoring  10/26/2022    Diabetes screen  10/31/2022    Colon cancer screen fecal DNA test (Cologuard)  05/22/2024    DTaP/Tdap/Td vaccine (4 - Td or Tdap) 11/06/2029    Shingles Vaccine  Completed    Hepatitis C screen  Completed    Hepatitis A vaccine  Aged Out    Hepatitis B vaccine  Aged Out    Hib vaccine  Aged Out    Meningococcal (ACWY) vaccine  Aged Out     Recommendations for [employer] Due: see orders and patient instructions/AVS.  . Recommended screening schedule for the next 5-10 years is provided to the patient in written form: see Patient Instructions/AVS.    Fatmata Araujo, was evaluated through a synchronous (real-time) telephone encounter. The patient (or guardian if applicable) is aware that this is a billable service. Verbal consent to proceed has been obtained within the past 12 months. The visit was conducted pursuant to the emergency declaration under the [address] [place] [address], [address] [place] [address] authority and the [employer] General Act. Patient identification was verified, and a caregiver was present when appropriate. The patient was located in a state where the provider was credentialed to provide care. Total time spent for this encounter: Not billed by time    --Meera Hussein DO on 11/30/2021 at 11:39 PM    An electronic signature was used to authenticate this note. Art Gilman RN, 11/23/2021, performed the documented evaluation under the direct supervision of the attending physician.       This encounter was performed under myMeera DOs, direct supervision, 11/23/2021.

## 2021-11-23 NOTE — TELEPHONE ENCOUNTER
Pt would like refill of Ativan due to increased stress at home, granddaughter recently dx with cancer. Pt also states her infection has returned and will need back surgery again, has appt with Neurosurg in December. Per BERTHA, last fill 9/29/21, quantity 3 for 1 days from [facility]. Last fill from Dr. Kecia Preston 4/23/21, quant 30 for 30 days. Needs sent through [employer] to [id number] [place] [address] [address]. Maninder Woodard called requesting a refill of the below medication which has been pended for you:     Requested Prescriptions     Pending Prescriptions Disp Refills    LORazepam (ATIVAN) 1 MG tablet 30 tablet 0       Last Appointment Date: 11/23/2021  Next Appointment Date: Visit date not found    Allergies   Allergen Reactions    Benazepril Other (See Comments)     cough    Dynabac [Dirithromycin]     Levothyroxine Other (See Comments)     Caused leg swelling, headache, insomnia, nausea.     Lisinopril Other (See Comments)     Cough      Other      Generic Prozac

## 2021-11-24 NOTE — TELEPHONE ENCOUNTER
Controlled Substance Monitoring:    Acute and Chronic Pain Monitoring:   RX Monitoring 11/23/2021   Attestation -   Periodic Controlled Substance Monitoring No signs of potential drug abuse or diversion identified. Obtained or confirmed \"Medication Contract\" on file.

## 2021-12-03 ENCOUNTER — OFFICE VISIT (OUTPATIENT)
Dept: NEUROSURGERY | Age: 66
End: 2021-12-03
Payer: MEDICARE

## 2021-12-03 VITALS
DIASTOLIC BLOOD PRESSURE: 84 MMHG | HEART RATE: 74 BPM | HEIGHT: 62 IN | SYSTOLIC BLOOD PRESSURE: 126 MMHG | RESPIRATION RATE: 20 BRPM | OXYGEN SATURATION: 98 % | WEIGHT: 262 LBS | BODY MASS INDEX: 48.21 KG/M2

## 2021-12-03 DIAGNOSIS — Q76.49 SPINAL DEFORMITY: Primary | ICD-10-CM

## 2021-12-03 DIAGNOSIS — M46.46 LUMBAR DISCITIS: ICD-10-CM

## 2021-12-03 DIAGNOSIS — M46.20 VERTEBRAL OSTEOMYELITIS (HCC): ICD-10-CM

## 2021-12-03 PROCEDURE — G8484 FLU IMMUNIZE NO ADMIN: HCPCS | Performed by: NEUROLOGICAL SURGERY

## 2021-12-03 PROCEDURE — 4004F PT TOBACCO SCREEN RCVD TLK: CPT | Performed by: NEUROLOGICAL SURGERY

## 2021-12-03 PROCEDURE — 99213 OFFICE O/P EST LOW 20 MIN: CPT

## 2021-12-03 PROCEDURE — G8417 CALC BMI ABV UP PARAM F/U: HCPCS | Performed by: NEUROLOGICAL SURGERY

## 2021-12-03 PROCEDURE — 1090F PRES/ABSN URINE INCON ASSESS: CPT | Performed by: NEUROLOGICAL SURGERY

## 2021-12-03 PROCEDURE — 3017F COLORECTAL CA SCREEN DOC REV: CPT | Performed by: NEUROLOGICAL SURGERY

## 2021-12-03 PROCEDURE — G8427 DOCREV CUR MEDS BY ELIG CLIN: HCPCS | Performed by: NEUROLOGICAL SURGERY

## 2021-12-03 PROCEDURE — 99204 OFFICE O/P NEW MOD 45 MIN: CPT | Performed by: NEUROLOGICAL SURGERY

## 2021-12-03 PROCEDURE — 4040F PNEUMOC VAC/ADMIN/RCVD: CPT | Performed by: NEUROLOGICAL SURGERY

## 2021-12-03 PROCEDURE — G8400 PT W/DXA NO RESULTS DOC: HCPCS | Performed by: NEUROLOGICAL SURGERY

## 2021-12-03 PROCEDURE — 1123F ACP DISCUSS/DSCN MKR DOCD: CPT | Performed by: NEUROLOGICAL SURGERY

## 2021-12-03 NOTE — PROGRESS NOTES
[address]  [address] [facility], [address]  [facility] [address]  Dept: [phone]  Loc: [phone]    Patient:  Lynette Nguyen  YOB: 1955  Date: 12/3/21    The patient is a 78 y.o. female who presents today for consult of the following problems:     Chief Complaint   Patient presents with    New Patient    Back Pain     osteomyelitis lumbar spine- review MRI from 10/27/21             HPI:     Lynette Nguyen is a 78 y.o. female on whom neurosurgical consultation was requested by Levy Potts DO for management of lumbar osteomyelitis. The patient relays a story of beginning in July when she was in the end of July where she became clearly septic with bacteremia related to E. coli and what sounds of a spinal infection. In the interim the patient has been treated with prolonged IV antibiotics and managed with infectious disease. It appears that her pain still has been significant and persistent 6-10 out of 10 on a daily basis. She can only ambulate for very short distances and is essentially wheelchair-bound otherwise mainly due to the pain. She denies any lower extremity numbness tingling weakness bowel or bladder dysfunction or saddle region and numbness. She states that the majority of her pain is disabling and worse when she is upright or standing which is improved slightly when she is laying down. She has been off of antibiotics and managed with infectious disease with near normalization of her CRP. She denies any fevers chills nausea or vomiting on a daily basis as well. Off antibiotics since mid october.       History:     Past Medical History:   Diagnosis Date    Chronic back pain     Depression     GERD (gastroesophageal reflux disease)     Hyperlipidemia     Hypertension     Morbid obesity ([facility] [address]      Past Surgical History:   Procedure Laterality Date    APPENDECTOMY  1982    COLONOSCOPY  2005  CT ABSCESS DRAIN SUBCUTANEOUS  2021    CT ABSCESS DRAIN SUBCUTANEOUS 2021 [facility] CT SCAN    CYSTOSCOPY      HEMORRHOID SURGERY      HYSTERECTOMY      partial - both ovaries remain; done for uterine fibroid    OTHER SURGICAL HISTORY      fissure repair     Family History   Problem Relation Age of Onset    Heart Disease Father     Heart Disease Brother         MI    Cancer Brother         stomach or prostate    Heart Disease Brother         CABG in his late 40's/early 52's    High Blood Pressure Mother     Cancer Maternal Grandmother         breast    High Blood Pressure Sister     Diabetes Paternal Grandmother     Lupus Other         Niece     Current Outpatient Medications on File Prior to Visit   Medication Sig Dispense Refill    LORazepam (ATIVAN) 1 MG tablet Take 1 tablet by mouth daily as needed for Anxiety for up to 30 days. 30 tablet 0    buPROPion (WELLBUTRIN XL) 300 MG extended release tablet TAKE 1 TABLET EVERY MORNING 90 tablet 3    atenolol (TENORMIN) 50 MG tablet TAKE 1 TABLET DAILY 90 tablet 3    losartan (COZAAR) 25 MG tablet TAKE 1 TABLET DAILY 90 tablet 3    levothyroxine (SYNTHROID) 25 MCG tablet TAKE 1 TABLET DAILY FIRST THING IN THE MORNING, ON AN EMPTY STOMACH, 30 MINUTES PRIOR TO OTHER MEDICATIONS OR FOOD 90 tablet 0    atorvastatin (LIPITOR) 20 MG tablet TAKE 1 TABLET DAILY 90 tablet 3    furosemide (LASIX) 20 MG tablet TAKE 1 TABLET DAILY 90 tablet 3     No current facility-administered medications on file prior to visit. Social History     Tobacco Use    Smoking status: Current Some Day Smoker     Packs/day: 0.25     Years: 40.00     Pack years: 10.00     Types: Cigarettes     Start date: 2014     Last attempt to quit: 2016     Years since quittin.6    Smokeless tobacco: Never Used   Vaping Use    Vaping Use: Never used   Substance Use Topics    Alcohol use:  Yes     Alcohol/week: 0.0 standard drinks     Comment: social    Drug use: Not Currently       Allergies   Allergen Reactions    Benazepril Other (See Comments)     cough    Dynabac [Dirithromycin]     Levothyroxine Other (See Comments)     Caused leg swelling, headache, insomnia, nausea.  Lisinopril Other (See Comments)     Cough      Other      Generic Prozac       Review of Systems  Constitutional: Negative for activity change and appetite change. HENT: Negative for ear pain and facial swelling. Eyes: Negative for discharge and itching. Respiratory: Negative for choking and chest tightness. Cardiovascular: Negative for chest pain and leg swelling. Gastrointestinal: Negative for nausea and abdominal pain. Endocrine: Negative for cold intolerance and heat intolerance. Genitourinary: Negative for frequency and flank pain. Musculoskeletal: Negative for myalgias and joint swelling. Skin: Negative for rash and wound. Allergic/Immunologic: Negative for environmental allergies and food allergies. Hematological: Negative for adenopathy. Does not bruise/bleed easily. Psychiatric/Behavioral: Negative for self-injury. The patient is not nervous/anxious. Physical Exam:      /84 (Site: Left Upper Arm, Position: Sitting, Cuff Size: Large Adult)   Pulse 74   Resp 20   Ht 5' 2\" (1.575 m)   Wt 262 lb (118.8 kg)   LMP  (LMP Unknown)   SpO2 98%   Breastfeeding No   BMI 47.92 kg/m²   Estimated body mass index is 47.92 kg/m² as calculated from the following:    Height as of this encounter: 5' 2\" (1.575 m). Weight as of this encounter: 262 lb (118.8 kg). General:  Emily Rdz is a 78y.o. year old female who appears her stated age. HEENT: Normocephalic atraumatic. Neck supple. Chest: regular rate; pulses equal  Abdomen: Soft nontender nondistended. Normoactive bowel sounds.   Ext: DP and PT pulses 2+, good cap refill  Neuro    Mentation  Appropriate affect  Registration intact  Orientation intact  3 item recall intact  Judgement intact to situation    Cranial Nerves:   Pupils equal and reactive to light  Extraocular motion intact  Face and shrug symmetric  Tongue midline  No dysarthria  v1-3 sensation symmetric, masseter tone symmetric  Hearing symmetric and intact to finger rub    Sensation:   intact    Motor  L deltoid 5/5; R deltoid 5/5  L biceps 5/5; R biceps 5/5  L triceps 5/5; R triceps 5/5  L wrist extension 5/5; R wrist extension 5/5  L intrinsics 5/5; R intrinsics 5/5     L iliopsoas 5/5 , R iliopsoas 5/5  L quadriceps 5/5; R quadriceps 5/5  L Dorsiflexion 5/5; R dorsiflexion 5/5  L Plantarflexion 5/5; R plantarflexion 5/5  L EHL 5/5; R EHL 5/5    Reflexes  L Brachioradialis 2+/4; R brachioradialis 2+/4  L Biceps 2+/4; R Biceps 2+/4  L Triceps 2+/4; R Triceps 2+/4  L Patellar 2+/4: R Patellar 2+/4  L Achilles 2+/4; R Achilles 2+/4    hoffmans L: neg  hoffmans R: neg  Clonus L: neg  Clonus R: neg  Babinski L: up  Babinski R; up    Studies Review:     MRI lumbar spine was still enhancing within the space as well as the dorsal epidural region with no evidence of epidural abscess and mainly phlegmon. Some foraminal disease at L3 bilateral.  No open listhesis. Assessment and Plan:      1. Spinal deformity    2. Lumbar discitis    3. Vertebral osteomyelitis ([address]          Plan: We will evaluate with a new CT concern and last one was in August.  She does have some level of osteolytic change along the endplates but will see if there is any progression in the interim. We will also obtain flexion-extension lumbar x-rays as well as scoliosis films to ensure that there is no instability as well as progressive flaps and sagittal plane deformity that would have to be addressed. Last markers are from early September which has been approximately 3 months so obtain a new CRP ESR and CBC in the interim. Followup: No follow-ups on file. Prescriptions Ordered:  No orders of the defined types were placed in this encounter.        Orders Placed:  Orders Placed This Encounter   Procedures    CT LUMBAR SPINE WO CONTRAST     Standing Status:   Future     Standing Expiration Date:   3/3/2022     Order Specific Question:   Reason for exam:     Answer:   eval bony lytic process    XR LUMBAR SPINE FLEXION AND EXTENSION ONLY     Standing lateral Flexion, Neutral, extension  Include both femoral heads on neutral imaging     Standing Status:   Future     Standing Expiration Date:   12/3/2022     Order Specific Question:   Reason for exam:     Answer:   evaluate for instability    XR SPINE ENTIRE (2-3 VIEWS)     Standing Status:   Future     Standing Expiration Date:   12/3/2022     Scheduling Instructions:      STANDING -- AP and Lateral; Include C2 to Pelvis & both femoral heads     Order Specific Question:   Reason for exam:     Answer:   scoliosis    C-Reactive Protein     Standing Status:   Future     Standing Expiration Date:   12/3/2022    Sedimentation Rate     Standing Status:   Future     Standing Expiration Date:   12/3/2022        Electronically signed by Ty Buckley DO on 12/3/2021 at 11:00 AM    Please note that this chart was generated using voice recognition Dragon dictation software. Although every effort was made to ensure the accuracy of this automated transcription, some errors in transcription may have occurred.

## 2021-12-07 ENCOUNTER — HOSPITAL ENCOUNTER (OUTPATIENT)
Dept: CT IMAGING | Age: 66
Discharge: HOME | End: 2021-12-09
Payer: MEDICARE

## 2021-12-07 ENCOUNTER — HOSPITAL ENCOUNTER (OUTPATIENT)
Dept: GENERAL RADIOLOGY | Age: 66
Discharge: HOME | End: 2021-12-09
Payer: MEDICARE

## 2021-12-07 DIAGNOSIS — M46.46 LUMBAR DISCITIS: ICD-10-CM

## 2021-12-07 DIAGNOSIS — M46.20 VERTEBRAL OSTEOMYELITIS (HCC): ICD-10-CM

## 2021-12-07 DIAGNOSIS — Q76.49 SPINAL DEFORMITY: ICD-10-CM

## 2021-12-07 PROCEDURE — 72120 X-RAY BEND ONLY L-S SPINE: CPT

## 2021-12-07 PROCEDURE — 72131 CT LUMBAR SPINE W/O DYE: CPT

## 2021-12-13 ENCOUNTER — HOSPITAL ENCOUNTER (OUTPATIENT)
Dept: GENERAL RADIOLOGY | Age: 66
Discharge: HOME | End: 2021-12-15
Payer: MEDICARE

## 2021-12-13 PROCEDURE — 72082 X-RAY EXAM ENTIRE SPI 2/3 VW: CPT

## 2022-01-17 ENCOUNTER — VIRTUAL VISIT (OUTPATIENT)
Dept: NEUROSURGERY | Age: 67
End: 2022-01-17
Payer: MEDICARE

## 2022-01-17 DIAGNOSIS — M46.46 LUMBAR DISCITIS: Primary | ICD-10-CM

## 2022-01-17 DIAGNOSIS — M46.20 VERTEBRAL OSTEOMYELITIS (HCC): ICD-10-CM

## 2022-01-17 DIAGNOSIS — Q76.49 SPINAL DEFORMITY: ICD-10-CM

## 2022-01-17 PROCEDURE — G8400 PT W/DXA NO RESULTS DOC: HCPCS | Performed by: NEUROLOGICAL SURGERY

## 2022-01-17 PROCEDURE — 1090F PRES/ABSN URINE INCON ASSESS: CPT | Performed by: NEUROLOGICAL SURGERY

## 2022-01-17 PROCEDURE — 1123F ACP DISCUSS/DSCN MKR DOCD: CPT | Performed by: NEUROLOGICAL SURGERY

## 2022-01-17 PROCEDURE — 4040F PNEUMOC VAC/ADMIN/RCVD: CPT | Performed by: NEUROLOGICAL SURGERY

## 2022-01-17 PROCEDURE — 99214 OFFICE O/P EST MOD 30 MIN: CPT | Performed by: NEUROLOGICAL SURGERY

## 2022-01-17 PROCEDURE — G8428 CUR MEDS NOT DOCUMENT: HCPCS | Performed by: NEUROLOGICAL SURGERY

## 2022-01-17 PROCEDURE — 3017F COLORECTAL CA SCREEN DOC REV: CPT | Performed by: NEUROLOGICAL SURGERY

## 2022-01-17 NOTE — PROGRESS NOTES
2022    TELEHEALTH EVALUATION -- Audio/Visual (During OZPWY-35 public health emergency)    HPI:    Joselyn Emerson (:  1955) has requested an audio/video evaluation for the following concern(s):    Patient still has a significant mount of axial back pain ranging anywhere from 5-6 out of 10 worse when changing position when she gets up in the morning and attempts to arise from the bed. Some palliation with rest and over-the-counter medications. Denies any radiating numbness or tingling in the legs. Has not been utilizing a cane or walker. Denies any fevers chills nausea or vomiting. Has been able to function at most periods. Denies any changes over the course of the last 1 to 2 weeks. Denies saddle anesthesia or bowel bladder    Review of Systems    Prior to Visit Medications    Medication Sig Taking? Authorizing Provider   buPROPion (WELLBUTRIN XL) 300 MG extended release tablet TAKE 1 TABLET EVERY MORNING  Magi Oneill, DO   atenolol (TENORMIN) 50 MG tablet TAKE 1 TABLET DAILY  Royanne Ripple Black, DO   losartan (COZAAR) 25 MG tablet TAKE 1 TABLET DAILY  Royanne Ripple Black, DO   levothyroxine (SYNTHROID) 25 MCG tablet TAKE 1 TABLET DAILY FIRST THING IN THE MORNING, ON AN EMPTY STOMACH, 30 MINUTES PRIOR TO OTHER MEDICATIONS OR FOOD  Royanne Ripple Black, DO   atorvastatin (LIPITOR) 20 MG tablet TAKE 1 TABLET DAILY  Royanne Ripple Black, DO   furosemide (LASIX) 20 MG tablet TAKE 1 TABLET DAILY  Ian Grullon, DO       Social History     Tobacco Use    Smoking status: Current Some Day Smoker     Packs/day: 0.25     Years: 40.00     Pack years: 10.00     Types: Cigarettes     Start date: 2014     Last attempt to quit: 2016     Years since quittin.7    Smokeless tobacco: Never Used   Vaping Use    Vaping Use: Never used   Substance Use Topics    Alcohol use:  Yes     Alcohol/week: 0.0 standard drinks     Comment: social    Drug use: Not Currently        Allergies   Allergen Reactions    Benazepril Other (See Comments)     cough    Dynabac [Dirithromycin]     Levothyroxine Other (See Comments)     Caused leg swelling, headache, insomnia, nausea.  Lisinopril Other (See Comments)     Cough      Other      Generic Prozac   ,   Past Medical History:   Diagnosis Date    Chronic back pain     Depression     GERD (gastroesophageal reflux disease)     Hyperlipidemia     Hypertension     Morbid obesity ([facility] [address]    ,   Past Surgical History:   Procedure Laterality Date    APPENDECTOMY  1982    COLONOSCOPY      CT ABSCESS DRAIN SUBCUTANEOUS  2021    CT ABSCESS DRAIN SUBCUTANEOUS 2021 [facility] CT SCAN    CYSTOSCOPY      HEMORRHOID SURGERY      HYSTERECTOMY  1984    partial - both ovaries remain; done for uterine fibroid    OTHER SURGICAL HISTORY      fissure repair   ,   Social History     Tobacco Use    Smoking status: Current Some Day Smoker     Packs/day: 0.25     Years: 40.00     Pack years: 10.00     Types: Cigarettes     Start date: 2014     Last attempt to quit: 2016     Years since quittin.7    Smokeless tobacco: Never Used   Vaping Use    Vaping Use: Never used   Substance Use Topics    Alcohol use:  Yes     Alcohol/week: 0.0 standard drinks     Comment: social    Drug use: Not Currently   ,   Family History   Problem Relation Age of Onset    Heart Disease Father     Heart Disease Brother         MI    Cancer Brother         stomach or prostate    Heart Disease Brother         CABG in his late 40's/early 52's    High Blood Pressure Mother     Cancer Maternal Grandmother         breast    High Blood Pressure Sister     Diabetes Paternal Grandmother     Lupus Other         Niece       PHYSICAL EXAMINATION:  [ INSTRUCTIONS:  \"[x]\" Indicates a positive item  \"[]\" Indicates a negative item  -- DELETE ALL ITEMS NOT EXAMINED]  Vital Signs: (As obtained by patient/caregiver or practitioner observation)    Blood pressure-  Heart rate-    Respiratory rate-    Temperature- Pulse oximetry-     Constitutional: [x] Appears well-developed and well-nourished [] No apparent distress      [x] Abnormal-   Mental status  [x] Alert and awake  [x] Oriented to person/place/time [x]Able to follow commands      Eyes:  EOM    [x]  Normal  [] Abnormal-  Sclera  [x]  Normal  [] Abnormal -         Discharge [x]  None visible  [] Abnormal -    HENT:   [x] Normocephalic, atraumatic. [] Abnormal   [x] Mouth/Throat: Mucous membranes are moist.     External Ears [x] Normal  [] Abnormal-     Neck: [x] No visualized mass     Pulmonary/Chest: [x] Respiratory effort normal.  [x] No visualized signs of difficulty breathing or respiratory distress        [] Abnormal-      Musculoskeletal:   [x] Normal gait with no signs of ataxia         [x] Normal range of motion of neck        [] Abnormal-       Neurological:        [x] No Facial Asymmetry (Cranial nerve 7 motor function) (limited exam to video visit)          [x] No gaze palsy        [] Abnormal-         Skin:        [x] No significant exanthematous lesions or discoloration noted on facial skin         [] Abnormal-            Psychiatric:       [x] Normal Affect [x] No Hallucinations        [] Abnormal-     Other pertinent observable physical exam findings-     ASSESSMENT/PLAN:    Lumbar osteodiscitis with epidural abscess L3/4    Patient with improved clinical presentation and stabilized CRP without constitutional signs and symptoms of infection. Will obtain MRI lumbar spine as follow-up study to ensure that there has been resolution of the epidural abscess component. As far as the CT and x-rays there has been no progression of a radiographic deformity and it appears there has been sclerosing of the endplates. Mri L dual and f/u 3-4wks      Benjamin Haynes, was evaluated through a synchronous (real-time) audio-video encounter. The patient (or guardian if applicable) is aware that this is a billable service.  Verbal consent to proceed has been obtained within the past 12 months. The visit was conducted pursuant to the emergency declaration under the [address] [place] [address] and the [employer] General Act. Patient identification was verified, and a caregiver was present when appropriate. The patient was located in a state where the provider was credentialed to provide care. Total time spent on this encounter: 15    --Amanuel Zurita DO on 1/17/2022 at 3:16 PM    An electronic signature was used to authenticate this note.

## 2022-01-21 DIAGNOSIS — M46.46 LUMBAR DISCITIS: Primary | ICD-10-CM

## 2022-01-21 DIAGNOSIS — Q76.49 SPINAL DEFORMITY: ICD-10-CM

## 2022-01-25 ENCOUNTER — HOSPITAL ENCOUNTER (OUTPATIENT)
Dept: LAB | Age: 67
Discharge: HOME | End: 2022-01-25
Payer: MEDICARE

## 2022-01-25 DIAGNOSIS — M46.46 LUMBAR DISCITIS: ICD-10-CM

## 2022-01-25 DIAGNOSIS — E03.9 HYPOTHYROIDISM, UNSPECIFIED TYPE: ICD-10-CM

## 2022-01-25 DIAGNOSIS — Q76.49 SPINAL DEFORMITY: ICD-10-CM

## 2022-01-25 DIAGNOSIS — M46.20 VERTEBRAL OSTEOMYELITIS (HCC): ICD-10-CM

## 2022-01-25 LAB
CREAT SERPL-MCNC: 0.8 MG/DL (ref 0.5–0.9)
GFR AFRICAN AMERICAN: >60 ML/MIN
GFR NON-AFRICAN AMERICAN: >60 ML/MIN
GFR SERPL CREATININE-BSD FRML MDRD: NORMAL ML/MIN/{1.73_M2}
GFR SERPL CREATININE-BSD FRML MDRD: NORMAL ML/MIN/{1.73_M2}
SEDIMENTATION RATE, ERYTHROCYTE: 31 MM (ref 0–30)
TSH SERPL DL<=0.05 MIU/L-ACNC: 4.22 MIU/L (ref 0.3–5)

## 2022-01-25 PROCEDURE — 82565 ASSAY OF CREATININE: CPT

## 2022-01-25 PROCEDURE — 84439 ASSAY OF FREE THYROXINE: CPT

## 2022-01-25 PROCEDURE — 36415 COLL VENOUS BLD VENIPUNCTURE: CPT

## 2022-01-25 PROCEDURE — 86140 C-REACTIVE PROTEIN: CPT

## 2022-01-25 PROCEDURE — 84443 ASSAY THYROID STIM HORMONE: CPT

## 2022-01-25 PROCEDURE — 85652 RBC SED RATE AUTOMATED: CPT

## 2022-01-26 LAB
C-REACTIVE PROTEIN: 7.6 MG/L (ref 0–5)
THYROXINE, FREE: 1.01 NG/DL (ref 0.93–1.7)

## 2022-01-27 ENCOUNTER — HOSPITAL ENCOUNTER (OUTPATIENT)
Dept: MRI IMAGING | Age: 67
Discharge: HOME | End: 2022-01-29
Payer: MEDICARE

## 2022-01-27 DIAGNOSIS — M46.46 LUMBAR DISCITIS: ICD-10-CM

## 2022-01-27 DIAGNOSIS — M46.20 VERTEBRAL OSTEOMYELITIS (HCC): ICD-10-CM

## 2022-01-27 PROCEDURE — A9579 GAD-BASE MR CONTRAST NOS,1ML: HCPCS | Performed by: NEUROLOGICAL SURGERY

## 2022-01-27 PROCEDURE — 72158 MRI LUMBAR SPINE W/O & W/DYE: CPT

## 2022-01-27 PROCEDURE — 6360000004 HC RX CONTRAST MEDICATION: Performed by: NEUROLOGICAL SURGERY

## 2022-01-27 RX ADMIN — GADOTERIDOL 20 ML: 279.3 INJECTION, SOLUTION INTRAVENOUS at 17:10

## 2022-01-31 DIAGNOSIS — E03.9 HYPOTHYROIDISM, UNSPECIFIED TYPE: Primary | ICD-10-CM

## 2022-01-31 RX ORDER — LEVOTHYROXINE SODIUM 0.03 MG/1
12.5 TABLET ORAL DAILY
Qty: 0.1 TABLET | Refills: 0
Start: 2022-01-31

## 2022-02-11 ENCOUNTER — TELEPHONE (OUTPATIENT)
Dept: NEUROSURGERY | Age: 67
End: 2022-02-11

## 2022-02-11 NOTE — TELEPHONE ENCOUNTER
Pt called L/M on voicemail requesting results of her MRI , please call pt to discuss. MRI l Spine}    FINDINGS:   BONES/ALIGNMENT: Abnormal intense enhancement of the L3 and L4 vertebral   bodies is noted with severe L3/L4 height loss of the disc space seen with   peripheral enhancement involving the disc space identified.  L3-L4 bilateral   medial psoas muscle abnormal intraparenchymal enhancement is identified with   multifocal suspected developing intraparenchymal abscesses with the largest   on the left measuring up to 2.6 cm in length.  L3/L4 abnormal enhancement and   increased T2 weighted signal is seen involving the facet joint spaces   consistent with acute facetitis.  Abnormal epidural enhancement and   thickening is seen extending from the L3/L4 level inferiorly along the   anterior epidural space to below the field-of-view within the sacrum   associated with infectious phlegmon.  No definitive organized epidural   abscess is identified.       SPINAL CORD:  The conus terminates normally.       SOFT TISSUES: No abnormal enhancement is seen of the lumbar spine.  No   paraspinal mass identified.       L1-L2: Disc desiccation is noted with mild disc height loss in association   with posterior disc bulge without associated central canal compromise.    Neural foramina are patent.       L2-L3: Disc desiccation is noted with moderate disc height loss in   association with posterior disc bulge without associated central canal   compromise.  Neural foramina are patent.       L3-L4: Severe disc height loss is noted in setting of acute discitis with   posterior disc osteophyte complex present at this level which indents the   ventral thecal sac without causing central canal stenosis.  Disc osteophyte   complex encroaches upon and causes severe left and moderate right neural   foraminal stenosis.       L4-L5: Mild bilateral facet hypertrophic degenerative changes are present at   this level.  Otherwise, this disc interspace is unremarkable.       L5-S1: Moderate bilateral facet hypertrophic degenerative changes are present   at this level.  Otherwise, this disc interspace is unremarkable.

## 2022-03-01 DIAGNOSIS — E78.5 HYPERLIPIDEMIA, UNSPECIFIED HYPERLIPIDEMIA TYPE: ICD-10-CM

## 2022-03-01 DIAGNOSIS — R73.01 IMPAIRED FASTING GLUCOSE: Primary | ICD-10-CM

## 2022-03-01 DIAGNOSIS — E55.9 VITAMIN D DEFICIENCY: ICD-10-CM

## 2022-03-01 NOTE — TELEPHONE ENCOUNTER
Tammy Medrano called requesting a refill of the below medication which has been pended for you:     Requested Prescriptions     Pending Prescriptions Disp Refills    atorvastatin (LIPITOR) 20 MG tablet [Pharmacy Med Name: ATORVASTATIN TABS 20MG] 90 tablet 3     Sig: TAKE 1 TABLET DAILY       Last Appointment Date: 11/23/2021  Next Appointment Date: Visit date not found    Allergies   Allergen Reactions    Benazepril Other (See Comments)     cough    Dynabac [Dirithromycin]     Lisinopril Other (See Comments)     Cough      Other      Generic Prozac

## 2022-03-02 RX ORDER — ATORVASTATIN CALCIUM 20 MG/1
TABLET, FILM COATED ORAL
Qty: 90 TABLET | Refills: 0 | Status: SHIPPED | OUTPATIENT
Start: 2022-03-02 | End: 2022-05-26

## 2022-03-03 NOTE — TELEPHONE ENCOUNTER
Patient aware of lab work that needs completed. Writer scheduled patient for 3/17 for visit with pcp.

## 2022-03-09 ENCOUNTER — HOSPITAL ENCOUNTER (OUTPATIENT)
Dept: LAB | Age: 67
Discharge: HOME | End: 2022-03-09
Payer: MEDICARE

## 2022-03-09 DIAGNOSIS — E55.9 VITAMIN D DEFICIENCY: ICD-10-CM

## 2022-03-09 DIAGNOSIS — R73.01 IMPAIRED FASTING GLUCOSE: ICD-10-CM

## 2022-03-09 DIAGNOSIS — E78.5 HYPERLIPIDEMIA, UNSPECIFIED HYPERLIPIDEMIA TYPE: ICD-10-CM

## 2022-03-09 LAB
ALBUMIN SERPL-MCNC: 3.7 G/DL (ref 3.5–5.2)
ALBUMIN/GLOBULIN RATIO: 1.4 (ref 1–2.5)
ALP BLD-CCNC: 82 U/L (ref 35–104)
ALT SERPL-CCNC: 11 U/L (ref 5–33)
ANION GAP SERPL CALCULATED.3IONS-SCNC: 9 MMOL/L (ref 9–17)
AST SERPL-CCNC: 15 U/L
BILIRUB SERPL-MCNC: 0.44 MG/DL (ref 0.3–1.2)
BUN BLDV-MCNC: 16 MG/DL (ref 8–23)
BUN/CREAT BLD: 21 (ref 9–20)
CALCIUM SERPL-MCNC: 9.2 MG/DL (ref 8.6–10.4)
CHLORIDE BLD-SCNC: 106 MMOL/L (ref 98–107)
CO2: 26 MMOL/L (ref 20–31)
CREAT SERPL-MCNC: 0.76 MG/DL (ref 0.5–0.9)
GFR AFRICAN AMERICAN: >60 ML/MIN
GFR NON-AFRICAN AMERICAN: >60 ML/MIN
GFR SERPL CREATININE-BSD FRML MDRD: ABNORMAL ML/MIN/{1.73_M2}
GLUCOSE BLD-MCNC: 110 MG/DL (ref 70–99)
POTASSIUM SERPL-SCNC: 4 MMOL/L (ref 3.7–5.3)
SODIUM BLD-SCNC: 141 MMOL/L (ref 135–144)
TOTAL PROTEIN: 6.4 G/DL (ref 6.4–8.3)

## 2022-03-09 PROCEDURE — 80061 LIPID PANEL: CPT

## 2022-03-09 PROCEDURE — 83036 HEMOGLOBIN GLYCOSYLATED A1C: CPT

## 2022-03-09 PROCEDURE — 80053 COMPREHEN METABOLIC PANEL: CPT

## 2022-03-09 PROCEDURE — 36415 COLL VENOUS BLD VENIPUNCTURE: CPT

## 2022-03-09 PROCEDURE — 82306 VITAMIN D 25 HYDROXY: CPT

## 2022-03-10 LAB
CHOLESTEROL/HDL RATIO: 5.4
CHOLESTEROL: 201 MG/DL
ESTIMATED AVERAGE GLUCOSE: 114 MG/DL
HBA1C MFR BLD: 5.6 % (ref 4–6)
HDLC SERPL-MCNC: 37 MG/DL
LDL CHOLESTEROL: 132 MG/DL (ref 0–130)
TRIGL SERPL-MCNC: 162 MG/DL
VITAMIN D 25-HYDROXY: 31.5 NG/ML

## 2022-03-11 ENCOUNTER — TELEMEDICINE (OUTPATIENT)
Dept: NEUROSURGERY | Age: 67
End: 2022-03-11
Payer: MEDICARE

## 2022-03-11 DIAGNOSIS — M46.20 VERTEBRAL OSTEOMYELITIS (HCC): ICD-10-CM

## 2022-03-11 DIAGNOSIS — M46.46 LUMBAR DISCITIS: ICD-10-CM

## 2022-03-11 DIAGNOSIS — Q76.49 SPINAL DEFORMITY: Primary | ICD-10-CM

## 2022-03-11 PROCEDURE — 99211 OFF/OP EST MAY X REQ PHY/QHP: CPT

## 2022-03-11 PROCEDURE — 1123F ACP DISCUSS/DSCN MKR DOCD: CPT | Performed by: NEUROLOGICAL SURGERY

## 2022-03-11 PROCEDURE — 99213 OFFICE O/P EST LOW 20 MIN: CPT | Performed by: NEUROLOGICAL SURGERY

## 2022-03-11 PROCEDURE — 4040F PNEUMOC VAC/ADMIN/RCVD: CPT | Performed by: NEUROLOGICAL SURGERY

## 2022-03-11 PROCEDURE — G8427 DOCREV CUR MEDS BY ELIG CLIN: HCPCS | Performed by: NEUROLOGICAL SURGERY

## 2022-03-11 PROCEDURE — 3017F COLORECTAL CA SCREEN DOC REV: CPT | Performed by: NEUROLOGICAL SURGERY

## 2022-03-11 PROCEDURE — 1090F PRES/ABSN URINE INCON ASSESS: CPT | Performed by: NEUROLOGICAL SURGERY

## 2022-03-11 PROCEDURE — G8400 PT W/DXA NO RESULTS DOC: HCPCS | Performed by: NEUROLOGICAL SURGERY

## 2022-03-11 NOTE — PROGRESS NOTES
3/11/2022    TELEHEALTH EVALUATION -- Audio/Visual ([facility]    HPI:    Rina Bosch (:  1955) has requested an audio/video evaluation for the following concern(s):    Pt states that she is feeling better and back pain starts when she walks about half the store. Denies any leg pain or numbness. Can walk around the house without difficulty. Pain with worsen with the longer she is ambulating and improves with rest. Has not been using a cane or walker. Not on antibiotics and denies fevers chills nausea or emesis. Review of Systems    Prior to Visit Medications    Medication Sig Taking? Authorizing Provider   atorvastatin (LIPITOR) 20 MG tablet TAKE 1 TABLET DAILY Yes Nanettedane Njs Black, DO   levothyroxine (SYNTHROID) 25 MCG tablet Take 0.5 tablets by mouth Daily Yes Nanette Lansdale Black, DO   buPROPion (WELLBUTRIN XL) 300 MG extended release tablet TAKE 1 TABLET EVERY MORNING Yes Nanette Lansdale Black, DO   atenolol (TENORMIN) 50 MG tablet TAKE 1 TABLET DAILY Yes Nanette Lansdale Black, DO   losartan (COZAAR) 25 MG tablet TAKE 1 TABLET DAILY Yes Nanette Lansdale Black, DO   furosemide (LASIX) 20 MG tablet TAKE 1 TABLET DAILY Yes José Luis Roberson, DO       Social History     Tobacco Use    Smoking status: Current Some Day Smoker     Packs/day: 0.25     Years: 40.00     Pack years: 10.00     Types: Cigarettes     Start date: 2014     Last attempt to quit: 2016     Years since quittin.8    Smokeless tobacco: Never Used   Vaping Use    Vaping Use: Never used   Substance Use Topics    Alcohol use:  Yes     Alcohol/week: 0.0 standard drinks     Comment: social    Drug use: Not Currently        Allergies   Allergen Reactions    Benazepril Other (See Comments)     cough    Dynabac [Dirithromycin]     Lisinopril Other (See Comments)     Cough      Other      Generic Prozac   ,   Past Medical History:   Diagnosis Date    Chronic back pain     Depression     GERD (gastroesophageal reflux disease)  Hyperlipidemia     Hypertension     Morbid obesity ([facility] [address]    ,   Past Surgical History:   Procedure Laterality Date    APPENDECTOMY  1982    COLONOSCOPY      CT ABSCESS DRAIN SUBCUTANEOUS  2021    CT ABSCESS DRAIN SUBCUTANEOUS 2021 [facility] CT SCAN    CYSTOSCOPY      HEMORRHOID SURGERY      HYSTERECTOMY      partial - both ovaries remain; done for uterine fibroid    OTHER SURGICAL HISTORY      fissure repair   ,   Social History     Tobacco Use    Smoking status: Current Some Day Smoker     Packs/day: 0.25     Years: 40.00     Pack years: 10.00     Types: Cigarettes     Start date: 2014     Last attempt to quit: 2016     Years since quittin.8    Smokeless tobacco: Never Used   Vaping Use    Vaping Use: Never used   Substance Use Topics    Alcohol use:  Yes     Alcohol/week: 0.0 standard drinks     Comment: social    Drug use: Not Currently   ,   Family History   Problem Relation Age of Onset    Heart Disease Father     Heart Disease Brother         MI    Cancer Brother         stomach or prostate    Heart Disease Brother         CABG in his late 40's/early 52's    High Blood Pressure Mother     Cancer Maternal Grandmother         breast    High Blood Pressure Sister     Diabetes Paternal Grandmother     Lupus Other         Niece       PHYSICAL EXAMINATION:  [ INSTRUCTIONS:  \"[x]\" Indicates a positive item  \"[]\" Indicates a negative item  -- DELETE ALL ITEMS NOT EXAMINED]  Vital Signs: (As obtained by patient/caregiver or practitioner observation)    Blood pressure-  Heart rate-    Respiratory rate-    Temperature-  Pulse oximetry-     Constitutional: [x] Appears well-developed and well-nourished [x] No apparent distress      [] Abnormal-   Mental status  [x] Alert and awake  [x] Oriented to person/place/time [x]Able to follow commands      Eyes:  EOM    [x]  Normal  [] Abnormal-  Sclera  [x]  Normal  [] Abnormal -         Discharge [x]  None visible  [] Abnormal -    HENT:   [x] Normocephalic, atraumatic. [] Abnormal   [x] Mouth/Throat: Mucous membranes are moist.     External Ears [x] Normal  [] Abnormal-     Neck: [x] No visualized mass     Pulmonary/Chest: [x] Respiratory effort normal.  [x] No visualized signs of difficulty breathing or respiratory distress        [] Abnormal-      Musculoskeletal:   [x] Normal gait with no signs of ataxia         [x] Normal range of motion of neck        [] Abnormal-       Neurological:        [x] No Facial Asymmetry (Cranial nerve 7 motor function) (limited exam to video visit)          [x] No gaze palsy        [] Abnormal-         Skin:        [x] No significant exanthematous lesions or discoloration noted on facial skin         [] Abnormal-            Psychiatric:       [x] Normal Affect [x] No Hallucinations        [] Abnormal-     Other pertinent observable physical exam findings-     ASSESSMENT/PLAN:  Spinal osteodiscitis and vertebral osteomyelitis  Spinal deformity    PT to stretch and strengthen core, posture  Standing lumbar xrays      Carrillo Konrad, was evaluated through a synchronous (real-time) audio-video encounter. The patient (or guardian if applicable) is aware that this is a billable service, which includes applicable co-pays. This Virtual Visit was conducted with patient's (and/or legal guardian's) consent. The visit was conducted pursuant to the emergency declaration under the [address] [place] [address] authority and the [employer] and "[employer] General Act. Patient identification was verified, and a caregiver was present when appropriate. The patient was located at home in a state where the provider was licensed to provide care. Total time spent on this encounter: 15    --Nilo Julien DO on 3/11/2022 at 10:01 AM    An electronic signature was used to authenticate this note.

## 2022-03-17 ENCOUNTER — OFFICE VISIT (OUTPATIENT)
Dept: FAMILY MEDICINE CLINIC | Age: 67
End: 2022-03-17
Payer: MEDICARE

## 2022-03-17 ENCOUNTER — HOSPITAL ENCOUNTER (OUTPATIENT)
Dept: GENERAL RADIOLOGY | Age: 67
Discharge: HOME | End: 2022-03-19
Payer: MEDICARE

## 2022-03-17 VITALS
HEART RATE: 75 BPM | WEIGHT: 275 LBS | DIASTOLIC BLOOD PRESSURE: 84 MMHG | BODY MASS INDEX: 50.61 KG/M2 | TEMPERATURE: 97.2 F | OXYGEN SATURATION: 98 % | SYSTOLIC BLOOD PRESSURE: 132 MMHG | HEIGHT: 62 IN

## 2022-03-17 DIAGNOSIS — I10 ESSENTIAL (PRIMARY) HYPERTENSION: ICD-10-CM

## 2022-03-17 DIAGNOSIS — M46.46 LUMBAR DISCITIS: ICD-10-CM

## 2022-03-17 DIAGNOSIS — Z23 NEED FOR PNEUMOCOCCAL VACCINATION: ICD-10-CM

## 2022-03-17 DIAGNOSIS — M46.26 OSTEOMYELITIS OF VERTEBRA OF LUMBAR REGION (HCC): Primary | ICD-10-CM

## 2022-03-17 DIAGNOSIS — M46.20 VERTEBRAL OSTEOMYELITIS (HCC): ICD-10-CM

## 2022-03-17 DIAGNOSIS — M19.90 OSTEOARTHRITIS, UNSPECIFIED OSTEOARTHRITIS TYPE, UNSPECIFIED SITE: ICD-10-CM

## 2022-03-17 DIAGNOSIS — F41.9 ANXIETY: ICD-10-CM

## 2022-03-17 DIAGNOSIS — Q76.49 SPINAL DEFORMITY: ICD-10-CM

## 2022-03-17 PROCEDURE — PBSHW PNEUMOCOCCAL POLYSACCHARIDE VACCINE 23-VALENT =>2YO SQ/IM: Performed by: FAMILY MEDICINE

## 2022-03-17 PROCEDURE — 72100 X-RAY EXAM L-S SPINE 2/3 VWS: CPT

## 2022-03-17 PROCEDURE — 99212 OFFICE O/P EST SF 10 MIN: CPT | Performed by: FAMILY MEDICINE

## 2022-03-17 PROCEDURE — 4040F PNEUMOC VAC/ADMIN/RCVD: CPT | Performed by: FAMILY MEDICINE

## 2022-03-17 PROCEDURE — 1090F PRES/ABSN URINE INCON ASSESS: CPT | Performed by: FAMILY MEDICINE

## 2022-03-17 PROCEDURE — 3017F COLORECTAL CA SCREEN DOC REV: CPT | Performed by: FAMILY MEDICINE

## 2022-03-17 PROCEDURE — 1123F ACP DISCUSS/DSCN MKR DOCD: CPT | Performed by: FAMILY MEDICINE

## 2022-03-17 PROCEDURE — 90732 PPSV23 VACC 2 YRS+ SUBQ/IM: CPT | Performed by: FAMILY MEDICINE

## 2022-03-17 PROCEDURE — G8484 FLU IMMUNIZE NO ADMIN: HCPCS | Performed by: FAMILY MEDICINE

## 2022-03-17 PROCEDURE — G8427 DOCREV CUR MEDS BY ELIG CLIN: HCPCS | Performed by: FAMILY MEDICINE

## 2022-03-17 PROCEDURE — G8417 CALC BMI ABV UP PARAM F/U: HCPCS | Performed by: FAMILY MEDICINE

## 2022-03-17 PROCEDURE — G8400 PT W/DXA NO RESULTS DOC: HCPCS | Performed by: FAMILY MEDICINE

## 2022-03-17 PROCEDURE — 4004F PT TOBACCO SCREEN RCVD TLK: CPT | Performed by: FAMILY MEDICINE

## 2022-03-17 PROCEDURE — 99214 OFFICE O/P EST MOD 30 MIN: CPT | Performed by: FAMILY MEDICINE

## 2022-03-17 RX ORDER — LORAZEPAM 1 MG/1
1 TABLET ORAL EVERY 6 HOURS PRN
COMMUNITY
End: 2022-05-26 | Stop reason: SDUPTHER

## 2022-03-17 NOTE — PROGRESS NOTES
[address]  [address], [address]  [phone]      Shantanu Delgado is a 78 y.o. female who presents today for her medical conditions/complaints as noted below. Shantanu Delgado is c/o of Hypertension and Discuss Medications      HPI:     Pt here today for follow-up of HTN, anxiety, and osteomyelitis. MRI on 1/27/22 ---    Standing x-ray today ---    Pt has been cleared to return to normal activities; was recommended to do regular stretches. Will start with the stretches from when she was doing home PT, and then will discuss stretches further with Neurosurgery PA Gio Subramanian) at upcoming appt in 3 months. Taking Ibuprofen 600-1000 mg as needed, but not every day; also using Bengay as needed for her knees. Walking without cane or walker, but still holds onto furniture in the house. Has scooter to use when needed. Down 17 lbs since OV in 5/2021. Would like to get a lift chair - having trouble getting out of her current furniture due to arthritis in her knees and continued back pain. Has trouble with reaching the lever for a recliner to put the footrest down. Mood has been pretty stable recently; has had stressor with recently allowing man to rent a room in her house, but he is moving out today, which will make things less stressful. Has been using Lorazepam only sparingly - maybe once per week. Use more when they found out earlier this year that her grand-daughter has stage 4 cancer. BP well-controlled today - 132/84. Has not smoked in the past 8 months.          Past Medical History:   Diagnosis Date    Chronic back pain     Depression     GERD (gastroesophageal reflux disease)     Hyperlipidemia     Hypertension     Morbid obesity ([facility] [address]       Past Surgical History:   Procedure Laterality Date    APPENDECTOMY  1982    COLONOSCOPY  2005    CT ABSCESS DRAIN SUBCUTANEOUS  8/18/2021    CT ABSCESS DRAIN SUBCUTANEOUS 8/18/2021 [facility] CT SCAN    CYSTOSCOPY      HEMORRHOID SURGERY      HYSTERECTOMY      partial - both ovaries remain; done for uterine fibroid    OTHER SURGICAL HISTORY      fissure repair     Family History   Problem Relation Age of Onset    Heart Disease Father     Heart Disease Brother         MI    Cancer Brother         stomach or prostate    Heart Disease Brother         CABG in his late 40's/early 52's    High Blood Pressure Mother     Cancer Maternal Grandmother         breast    High Blood Pressure Sister     Diabetes Paternal Grandmother     Lupus Other         Niece     Social History     Tobacco Use    Smoking status: Current Some Day Smoker     Packs/day: 0.25     Years: 40.00     Pack years: 10.00     Types: Cigarettes     Start date: 2014     Last attempt to quit: 2016     Years since quittin.9    Smokeless tobacco: Never Used   Substance Use Topics    Alcohol use: Yes     Alcohol/week: 0.0 standard drinks     Comment: social      Current Outpatient Medications   Medication Sig Dispense Refill    LORazepam (ATIVAN) 1 MG tablet Take 1 mg by mouth every 6 hours as needed for Anxiety.  Cholecalciferol (D3-1000 PO) Take by mouth      Lift Chair MISC by Does not apply route 1 each 0    atorvastatin (LIPITOR) 20 MG tablet TAKE 1 TABLET DAILY 90 tablet 0    levothyroxine (SYNTHROID) 25 MCG tablet Take 0.5 tablets by mouth Daily 0.1 tablet 0    buPROPion (WELLBUTRIN XL) 300 MG extended release tablet TAKE 1 TABLET EVERY MORNING 90 tablet 3    atenolol (TENORMIN) 50 MG tablet TAKE 1 TABLET DAILY 90 tablet 3    losartan (COZAAR) 25 MG tablet TAKE 1 TABLET DAILY 90 tablet 3    furosemide (LASIX) 20 MG tablet TAKE 1 TABLET DAILY 90 tablet 3     No current facility-administered medications for this visit.      Allergies   Allergen Reactions    Benazepril Other (See Comments)     cough    Dynabac [Dirithromycin]     Lisinopril Other (See Comments)     Cough      Other Generic Prozac       Health Maintenance   Topic Date Due    DEXA (modify frequency per FRAX score)  Never done    Breast cancer screen  07/10/2020    COVID-19 Vaccine (2 - Pfizer 3-dose series) 06/22/2021    Flu vaccine (1) Never done    Depression Monitoring  11/23/2022    Annual Wellness Visit (AWV)  11/24/2022    Lipid screen  03/09/2023    Potassium monitoring  03/09/2023    Creatinine monitoring  03/09/2023    Colorectal Cancer Screen  05/22/2024    Diabetes screen  03/09/2025    DTaP/Tdap/Td vaccine (4 - Td or Tdap) 11/06/2029    Shingles Vaccine  Completed    Pneumococcal 65+ years Vaccine  Completed    Hepatitis C screen  Completed    Hepatitis A vaccine  Aged Out    Hepatitis B vaccine  Aged Out    Hib vaccine  Aged Out    Meningococcal (ACWY) vaccine  Aged Out       Subjective:      Review of Systems   Psychiatric/Behavioral: Positive for sleep disturbance (sometimes has trouble falling asleep). Objective:     Vitals:    03/17/22 1220   BP: 132/84   Site: Right Upper Arm   Position: Sitting   Cuff Size: Large Adult   Pulse: 75   Temp: 97.2 °F (36.2 °C)   TempSrc: Temporal   SpO2: 98%   Weight: 275 lb (124.7 kg)   Height: 5' 2\" (1.575 m)     Physical Exam  Constitutional:       General: She is not in acute distress. Appearance: Normal appearance. HENT:      Head: Normocephalic and atraumatic. Eyes:      Conjunctiva/sclera: Conjunctivae normal.   Cardiovascular:      Rate and Rhythm: Normal rate and regular rhythm. Heart sounds: Normal heart sounds. Pulmonary:      Effort: Pulmonary effort is normal. No respiratory distress. Breath sounds: Normal breath sounds. Abdominal:      General: Bowel sounds are normal. There is no distension. Palpations: Abdomen is soft. Tenderness: There is no abdominal tenderness. Musculoskeletal:      Right lower leg: No edema. Left lower leg: No edema. Skin:     General: Skin is warm and dry.    Neurological: General: No focal deficit present. Mental Status: She is alert and oriented to person, place, and time. Psychiatric:         Mood and Affect: Mood normal.         Assessment:      1. Osteomyelitis of vertebra of lumbar region [facility])  -     Lift Chair MISC; Starting Thu 3/17/2022, Disp-1 each, R-0, Print  2. Osteoarthritis, unspecified osteoarthritis type, unspecified site  -     Lift Chair MISC; Starting Thu 3/17/2022, Disp-1 each, R-0, Print  3. Anxiety  4. Essential (primary) hypertension  5. Need for pneumococcal vaccination  -     PNEUMOVAX 23 subcutaneous/IM (Pneumococcal polysaccharide vaccine 23-valent >= 1yo)         Plan:      Return in about 5 months (around 8/2/2022) for f/u thyroid, labs, anxiety. Orders Placed This Encounter   Procedures    PNEUMOVAX 23 subcutaneous/IM (Pneumococcal polysaccharide vaccine 23-valent >= 1yo)     Orders Placed This Encounter   Medications    Lift Chair MISC     Sig: by Does not apply route     Dispense:  1 each     Refill:  0       Patient given educational materials - see patient instructions. Discussed use, benefit, and side effects of prescribed medications. All patient questions answered. Pt voiced understanding. Reviewed health maintenance.             Electronically signed by Mikhail Casas DO, DO on 3/27/2022 at 11:35 PM

## 2022-05-23 ENCOUNTER — PATIENT MESSAGE (OUTPATIENT)
Dept: FAMILY MEDICINE CLINIC | Age: 67
End: 2022-05-23

## 2022-05-23 DIAGNOSIS — E78.5 HYPERLIPIDEMIA, UNSPECIFIED HYPERLIPIDEMIA TYPE: ICD-10-CM

## 2022-05-23 DIAGNOSIS — F41.9 ANXIETY: Primary | ICD-10-CM

## 2022-05-23 NOTE — TELEPHONE ENCOUNTER
Per [facility], last fill 11/28/21, quantity 30 for 30 days. Amelia Cuello called requesting a refill of the below medication which has been pended for you:     Requested Prescriptions     Pending Prescriptions Disp Refills    atorvastatin (LIPITOR) 20 MG tablet [Pharmacy Med Name: ATORVASTATIN TABS 20MG] 90 tablet 3     Sig: TAKE 1 TABLET DAILY    LORazepam (ATIVAN) 1 MG tablet       Sig: Take 1 tablet by mouth every 6 hours as needed for Anxiety.        Last Appointment Date: 3/17/2022  Next Appointment Date: 8/17/2022    Allergies   Allergen Reactions    Benazepril Other (See Comments)     cough    Dynabac [Dirithromycin]     Lisinopril Other (See Comments)     Cough      Other      Generic Prozac

## 2022-05-23 NOTE — TELEPHONE ENCOUNTER
From: Huma Ready  To: Dr. Santos Dy: 5/23/2022 3:41 PM EDT  Subject: Lorazepam Tabs    Hello, I hope you are doing good. I am fine but I still once in a while need something to calm me down. I only have 6 pills left and I was wondering if you would renew my prescription. I still only take them as needed which I must say is getting a lot better. If you approve I get them from [facility].    Hope you have a wonderful summer   Thank you for your time   Huma Joseph [phone]

## 2022-05-26 RX ORDER — ATORVASTATIN CALCIUM 20 MG/1
TABLET, FILM COATED ORAL
Qty: 90 TABLET | Refills: 2 | Status: SHIPPED | OUTPATIENT
Start: 2022-05-26

## 2022-05-26 RX ORDER — LORAZEPAM 1 MG/1
1 TABLET ORAL DAILY PRN
Qty: 30 TABLET | Refills: 0 | Status: SHIPPED | OUTPATIENT
Start: 2022-05-26 | End: 2022-06-25

## 2022-05-26 NOTE — TELEPHONE ENCOUNTER
Controlled Substance Monitoring:    Acute and Chronic Pain Monitoring:   RX Monitoring 5/26/2022   Attestation -   Periodic Controlled Substance Monitoring No signs of potential drug abuse or diversion identified.     -

## 2022-06-04 ENCOUNTER — PATIENT MESSAGE (OUTPATIENT)
Dept: FAMILY MEDICINE CLINIC | Age: 67
End: 2022-06-04

## 2022-06-06 NOTE — TELEPHONE ENCOUNTER
From: Anthony Tim  To: Dr. Prasanna Gonzalez: 6/4/2022 12:11 PM EDT  Subject: uncomfortable mass    Good afternoon. I have a concern. Yesterday I was going to the bathroom and after I was done the inside of my vagina felt like I still had a tampon inside of me. When I checked it out I felt a mass that doesn't feel right. I have an appointment with you on Aug 17th. yet I don't know if I should wait that long to see you or not. I will be down there at the clinic on June 17th to see Keesha Infante upstairs for a follow-up on my back. The thing with my vagina doesn't hurt but it is uncomfortable. I don't know if you want me to wait until Aug 17th to see me or if you would like to see me beforehand. I will be on a short vacation starting June 18th until June 15th. I will look for your reply. Thank you and have a nice day    Sharlene Bounre

## 2022-06-17 ENCOUNTER — OFFICE VISIT (OUTPATIENT)
Dept: NEUROSURGERY | Age: 67
End: 2022-06-17
Payer: MEDICARE

## 2022-06-17 VITALS
DIASTOLIC BLOOD PRESSURE: 80 MMHG | WEIGHT: 278 LBS | RESPIRATION RATE: 16 BRPM | HEIGHT: 62 IN | HEART RATE: 75 BPM | SYSTOLIC BLOOD PRESSURE: 128 MMHG | OXYGEN SATURATION: 98 % | BODY MASS INDEX: 51.16 KG/M2

## 2022-06-17 DIAGNOSIS — M46.46 LUMBAR DISCITIS: ICD-10-CM

## 2022-06-17 DIAGNOSIS — M46.20 VERTEBRAL OSTEOMYELITIS (HCC): Primary | ICD-10-CM

## 2022-06-17 PROCEDURE — G8427 DOCREV CUR MEDS BY ELIG CLIN: HCPCS | Performed by: NURSE PRACTITIONER

## 2022-06-17 PROCEDURE — 3017F COLORECTAL CA SCREEN DOC REV: CPT | Performed by: NURSE PRACTITIONER

## 2022-06-17 PROCEDURE — 99213 OFFICE O/P EST LOW 20 MIN: CPT | Performed by: NURSE PRACTITIONER

## 2022-06-17 PROCEDURE — G8417 CALC BMI ABV UP PARAM F/U: HCPCS | Performed by: NURSE PRACTITIONER

## 2022-06-17 PROCEDURE — G8400 PT W/DXA NO RESULTS DOC: HCPCS | Performed by: NURSE PRACTITIONER

## 2022-06-17 PROCEDURE — 1036F TOBACCO NON-USER: CPT | Performed by: NURSE PRACTITIONER

## 2022-06-17 PROCEDURE — 1090F PRES/ABSN URINE INCON ASSESS: CPT | Performed by: NURSE PRACTITIONER

## 2022-06-17 PROCEDURE — 1123F ACP DISCUSS/DSCN MKR DOCD: CPT | Performed by: NURSE PRACTITIONER

## 2022-06-17 NOTE — PROGRESS NOTES
[place] [address] [employer]  [address] [facility]  [address] [facility], [address]  [place] [address] [place]  Dept: [phone]    Patient:  Michelle Garcia  YOB: 1955  Date: 6/17/22    The patient is a 75 y.o. female who presents today for consult of the following problems:     Chief Complaint   Patient presents with    Back Pain     12 wk f/u         HPI:     Michelle Garcia is a 75 y.o. female presents for follow-up of vertebral osteomyelitis, discitis. Since last visit in March, patient has been doing very well. Reports that she feels great, states that she is nearly 100% improved. Has been able to ambulate longer distances than she has ever before. Was able to come to the appointment today without any assistive devices, this was a big step for her. Denies any saddle anesthesia, changes to bowels or bladder. Has been able to maintain her household activities, currently spring cleaning and able to tolerate this. Completed antibiotic course, denies any fevers, chills.       History:     Past Medical History:   Diagnosis Date    Chronic back pain     Depression     GERD (gastroesophageal reflux disease)     Hyperlipidemia     Hypertension     Morbid obesity ([address]      Past Surgical History:   Procedure Laterality Date    APPENDECTOMY  1982    COLONOSCOPY  2005    CT ABSCESS DRAIN SUBCUTANEOUS  8/18/2021    CT ABSCESS DRAIN SUBCUTANEOUS 8/18/2021 [facility] CT SCAN    CYSTOSCOPY  2005    HEMORRHOID SURGERY      HYSTERECTOMY (CERVIX STATUS UNKNOWN)  1984    partial - both ovaries remain; done for uterine fibroid    OTHER SURGICAL HISTORY      fissure repair     Family History   Problem Relation Age of Onset    Heart Disease Father     Heart Disease Brother         MI    Cancer Brother         stomach or prostate    Heart Disease Brother         CABG in his late 40's/early 52's    High Blood Pressure Mother  Cancer Maternal Grandmother         breast    High Blood Pressure Sister     Diabetes Paternal Grandmother     Lupus Other         Niece     Current Outpatient Medications on File Prior to Visit   Medication Sig Dispense Refill    atorvastatin (LIPITOR) 20 MG tablet TAKE 1 TABLET DAILY 90 tablet 2    LORazepam (ATIVAN) 1 MG tablet Take 1 tablet by mouth daily as needed for Anxiety for up to 30 days. 30 tablet 0    Cholecalciferol (D3-1000 PO) Take by mouth      levothyroxine (SYNTHROID) 25 MCG tablet Take 0.5 tablets by mouth Daily 0.1 tablet 0    buPROPion (WELLBUTRIN XL) 300 MG extended release tablet TAKE 1 TABLET EVERY MORNING 90 tablet 3    atenolol (TENORMIN) 50 MG tablet TAKE 1 TABLET DAILY 90 tablet 3    losartan (COZAAR) 25 MG tablet TAKE 1 TABLET DAILY 90 tablet 3    furosemide (LASIX) 20 MG tablet TAKE 1 TABLET DAILY 90 tablet 3     No current facility-administered medications on file prior to visit. Social History     Tobacco Use    Smoking status: Former Smoker     Packs/day: 0.25     Years: 40.00     Pack years: 10.00     Types: Cigarettes     Start date: 2014     Quit date: 2016     Years since quittin.1    Smokeless tobacco: Never Used   Vaping Use    Vaping Use: Never used   Substance Use Topics    Alcohol use: Yes     Alcohol/week: 0.0 standard drinks     Comment: social    Drug use: Not Currently       Allergies   Allergen Reactions    Benazepril Other (See Comments)     cough    Dynabac [Dirithromycin]     Lisinopril Other (See Comments)     Cough      Other      Generic Prozac       Review of Systems  Constitutional: Negative for activity change and appetite change. HENT: Negative for ear pain and facial swelling. Eyes: Negative for discharge and itching. Respiratory: Negative for choking and chest tightness. Cardiovascular: Negative for chest pain and leg swelling. Gastrointestinal: Negative for nausea and abdominal pain.    Endocrine: Negative for cold intolerance and heat intolerance. Genitourinary: Negative for frequency and flank pain. Musculoskeletal: Negative for myalgias and joint swelling. Skin: Negative for rash and wound. Allergic/Immunologic: Negative for environmental allergies and food allergies. Hematological: Negative for adenopathy. Does not bruise/bleed easily. Psychiatric/Behavioral: Negative for self-injury. The patient is not nervous/anxious. Physical Exam:      /80 (Site: Left Upper Arm, Position: Sitting, Cuff Size: Large Adult)   Pulse 75   Resp 16   Ht 5' 2\" (1.575 m)   Wt 278 lb (126.1 kg)   LMP  (LMP Unknown)   SpO2 98%   Breastfeeding No   BMI 50.85 kg/m²   Estimated body mass index is 50.85 kg/m² as calculated from the following:    Height as of this encounter: 5' 2\" (1.575 m). Weight as of this encounter: 278 lb (126.1 kg). General:  Rashaun Gutierrez is a 75y.o. year old female who appears her stated age. HEENT: Normocephalic atraumatic. Neck supple. Chest: regular rate; pulses equal  Abdomen: Soft nontender nondistended.   Ext: DP and PT pulses 2+, good cap refill  Neuro    Mentation  Appropriate affect  Registration intact  Orientation intact  Judgement intact to situation    Cranial Nerves:   Pupils equal and reactive to light  Extraocular motion intact  Face and shrug symmetric  Tongue midline  No dysarthria  v1-3 sensation symmetric, masseter tone symmetric  Hearing symmetric    Sensation: Intact    Motor  L deltoid 5/5; R deltoid 5/5  L biceps 5/5; R biceps 5/5  L triceps 5/5; R triceps 5/5  L wrist extension 5/5; R wrist extension 5/5  L intrinsics 5/5; R intrinsics 5/5     L iliopsoas 5/5 , R iliopsoas 5/5  L quadriceps 5/5; R quadriceps 5/5  L Dorsiflexion 5/5; R dorsiflexion 5/5  L Plantarflexion 5/5; R plantarflexion 5/5  L EHL 5/5; R EHL 5/5    Reflexes  L Brachioradialis 2+/4; R brachioradialis 2+/4  L Biceps 2+/4; R Biceps 2+/4  L Triceps 2+/4; R Triceps 2+/4  L Patellar 2+/4: R Patellar 2+/4  L Achilles 2+/4; R Achilles 2+/4    hoffmans L: neg  hoffmans R: neg  Clonus L: neg  Clonus R: neg  Babinski L: neg  Babinski R: neg    Studies Review:     X-ray lumbar spine 3/17/2022:  FINDINGS:   There is advanced degenerative disc disease of the L2-L3, and L3-L4 discs,   with prominent osteophyte formation.  This is most prominently noted at the   L3-L4 disc.  The L1-L2, L4-L5 and L5-S1 discs appear unremarkable.  There is   no malalignment.  There are no fractures.  Facet joints and spinous processes   appear unremarkable. PCP notes reviewed    Assessment and Plan:      1. Vertebral osteomyelitis ([address]    2. Lumbar discitis          Plan: Patient doing well, feels almost complete resolution of previous symptoms. No fevers no chills, ambulating longer distances without any worsened pain. Continue to closely monitor symptoms, follow-up with any regression, new or worsening symptoms. Followup: Return if symptoms worsen or fail to improve. Prescriptions Ordered:  No orders of the defined types were placed in this encounter. Orders Placed:  No orders of the defined types were placed in this encounter. Electronically signed by VLADIMIR Lima CNP on 6/17/2022 at 9:30 AM    Please note that this chart was generated using voice recognition Dragon dictation software. Although every effort was made to ensure the accuracy of this automated transcription, some errors in transcription may have occurred.

## 2022-07-18 ENCOUNTER — OFFICE VISIT (OUTPATIENT)
Dept: FAMILY MEDICINE CLINIC | Age: 67
End: 2022-07-18
Payer: MEDICARE

## 2022-07-18 VITALS
DIASTOLIC BLOOD PRESSURE: 80 MMHG | HEIGHT: 62 IN | OXYGEN SATURATION: 98 % | TEMPERATURE: 97 F | SYSTOLIC BLOOD PRESSURE: 126 MMHG | HEART RATE: 76 BPM | WEIGHT: 278.8 LBS | BODY MASS INDEX: 51.3 KG/M2

## 2022-07-18 DIAGNOSIS — N94.9 LUMP IN VAGINA: Primary | ICD-10-CM

## 2022-07-18 DIAGNOSIS — Z12.31 ENCOUNTER FOR SCREENING MAMMOGRAM FOR BREAST CANCER: ICD-10-CM

## 2022-07-18 PROCEDURE — 3017F COLORECTAL CA SCREEN DOC REV: CPT | Performed by: FAMILY MEDICINE

## 2022-07-18 PROCEDURE — G8400 PT W/DXA NO RESULTS DOC: HCPCS | Performed by: FAMILY MEDICINE

## 2022-07-18 PROCEDURE — 1036F TOBACCO NON-USER: CPT | Performed by: FAMILY MEDICINE

## 2022-07-18 PROCEDURE — G8417 CALC BMI ABV UP PARAM F/U: HCPCS | Performed by: FAMILY MEDICINE

## 2022-07-18 PROCEDURE — 99213 OFFICE O/P EST LOW 20 MIN: CPT | Performed by: FAMILY MEDICINE

## 2022-07-18 PROCEDURE — 1123F ACP DISCUSS/DSCN MKR DOCD: CPT | Performed by: FAMILY MEDICINE

## 2022-07-18 PROCEDURE — 99212 OFFICE O/P EST SF 10 MIN: CPT | Performed by: FAMILY MEDICINE

## 2022-07-18 PROCEDURE — 1090F PRES/ABSN URINE INCON ASSESS: CPT | Performed by: FAMILY MEDICINE

## 2022-07-18 PROCEDURE — G8427 DOCREV CUR MEDS BY ELIG CLIN: HCPCS | Performed by: FAMILY MEDICINE

## 2022-07-18 ASSESSMENT — PATIENT HEALTH QUESTIONNAIRE - PHQ9
3. TROUBLE FALLING OR STAYING ASLEEP: 1
SUM OF ALL RESPONSES TO PHQ9 QUESTIONS 1 & 2: 1
1. LITTLE INTEREST OR PLEASURE IN DOING THINGS: 0
9. THOUGHTS THAT YOU WOULD BE BETTER OFF DEAD, OR OF HURTING YOURSELF: 0
2. FEELING DOWN, DEPRESSED OR HOPELESS: 1
SUM OF ALL RESPONSES TO PHQ QUESTIONS 1-9: 6
8. MOVING OR SPEAKING SO SLOWLY THAT OTHER PEOPLE COULD HAVE NOTICED. OR THE OPPOSITE, BEING SO FIGETY OR RESTLESS THAT YOU HAVE BEEN MOVING AROUND A LOT MORE THAN USUAL: 0
SUM OF ALL RESPONSES TO PHQ QUESTIONS 1-9: 6
10. IF YOU CHECKED OFF ANY PROBLEMS, HOW DIFFICULT HAVE THESE PROBLEMS MADE IT FOR YOU TO DO YOUR WORK, TAKE CARE OF THINGS AT HOME, OR GET ALONG WITH OTHER PEOPLE: 1
SUM OF ALL RESPONSES TO PHQ QUESTIONS 1-9: 6
SUM OF ALL RESPONSES TO PHQ QUESTIONS 1-9: 6
6. FEELING BAD ABOUT YOURSELF - OR THAT YOU ARE A FAILURE OR HAVE LET YOURSELF OR YOUR FAMILY DOWN: 2
4. FEELING TIRED OR HAVING LITTLE ENERGY: 1
5. POOR APPETITE OR OVEREATING: 1
7. TROUBLE CONCENTRATING ON THINGS, SUCH AS READING THE NEWSPAPER OR WATCHING TELEVISION: 0

## 2022-07-18 NOTE — PROGRESS NOTES
[facility]  [address], [address] [place]  [phone]      Shelby Marinelli is a 75 y.o. female who presents today for her medical conditions/complaints as noted below. Shelby Marinelli is c/o of Other (Vaginal discomfort, feels swollen on inside, did have vaginal pressure like patient had to urinate. )      HPI:     Pt here today for vaginal discomfort. Approx 6 weeks ago, she started to notice decreased ability to pass urine as easily, and felt more pressure at her vaginal opening and when she felt with her finger, she noticed something about the size of a gumball that felt soft and nontender but \"uncomfortable\". She paid attention over the next few days, and it seemed to get smaller and her urine flow would be improved when it was smaller. Urine sx's still vary now. Once in a while, she will get a sharp pain for 1-2 seconds, but it does not last.  No bleeding or sign of infection/discharge. Walking more; has not been using her scooter and cane as much. Only has to f/u with Neurosurgery as needed.         Past Medical History:   Diagnosis Date    Chronic back pain     Depression     GERD (gastroesophageal reflux disease)     Hyperlipidemia     Hypertension     Morbid [address] ([facility] [address]       Past Surgical History:   Procedure Laterality Date    APPENDECTOMY  1982    COLONOSCOPY  2005    CT ABSCESS DRAIN SUBCUTANEOUS  8/18/2021    CT ABSCESS DRAIN SUBCUTANEOUS 8/18/2021 [facility] CT SCAN    CYSTOSCOPY  2005    HEMORRHOID SURGERY      HYSTERECTOMY (CERVIX STATUS UNKNOWN)  1984    partial - both ovaries remain; done for uterine fibroid    OTHER SURGICAL HISTORY      fissure repair     Family History   Problem Relation Age of Onset    Heart Disease Father     Heart Disease Brother         MI    Cancer Brother         stomach or prostate    Heart Disease Brother         CABG in his late 40's/early 52's    High Blood Pressure Mother     Miscarriages / [place] Mother     Osteoporosis Mother     Cancer Maternal Grandmother         breast    High Blood Pressure Sister     Diabetes Paternal Grandmother     Lupus Other         Niece     Social History     Tobacco Use    Smoking status: Former     Packs/day: 0.00     Years: 40.00     Pack years: 0.00     Types: Cigarettes     Start date: 2014     Quit date: 2016     Years since quittin.2    Smokeless tobacco: Never   Substance Use Topics    Alcohol use: Yes     Comment: very little drinking  just once in a while      Current Outpatient Medications   Medication Sig Dispense Refill    atorvastatin (LIPITOR) 20 MG tablet TAKE 1 TABLET DAILY 90 tablet 2    Cholecalciferol (D3-1000 PO) Take by mouth      levothyroxine (SYNTHROID) 25 MCG tablet Take 0.5 tablets by mouth Daily 0.1 tablet 0    buPROPion (WELLBUTRIN XL) 300 MG extended release tablet TAKE 1 TABLET EVERY MORNING 90 tablet 3    atenolol (TENORMIN) 50 MG tablet TAKE 1 TABLET DAILY 90 tablet 3    losartan (COZAAR) 25 MG tablet TAKE 1 TABLET DAILY 90 tablet 3    furosemide (LASIX) 20 MG tablet TAKE 1 TABLET DAILY 90 tablet 3     No current facility-administered medications for this visit.      Allergies   Allergen Reactions    Benazepril Other (See Comments)     cough    Dynabac [Dirithromycin]     Lisinopril Other (See Comments)     Cough      Other      Generic Prozac       Health Maintenance   Topic Date Due    DEXA (modify frequency per FRAX score)  Never done    Breast cancer screen  07/10/2020    COVID-19 Vaccine (2 - Pfizer series) 2021    Flu vaccine (1) 2022    Annual Wellness Visit (AWV)  2022    Lipids  2023    Depression Monitoring  2023    Colorectal Cancer Screen  2024    Diabetes screen  2025    DTaP/Tdap/Td vaccine (4 - Td or Tdap) 2029    Shingles vaccine  Completed    Pneumococcal 65+ years Vaccine  Completed    Hepatitis C screen  Completed    Hepatitis A vaccine  Aged Out    Hepatitis B vaccine Aged Out    Hib vaccine  Aged Out    Meningococcal (ACWY) vaccine  Aged Out       Subjective:      Review of Systems    Objective:     Vitals:    07/18/22 1434   BP: 126/80   Site: Right Upper Arm   Position: Sitting   Cuff Size: Large Adult   Pulse: 76   Temp: 97 °F (36.1 °C)   TempSrc: Temporal   SpO2: 98%   Weight: 278 lb 12.8 oz (126.5 kg)   Height: 5' 2\" (1.575 m)     Physical Exam  Vitals and nursing note reviewed. Constitutional:       General: She is not in acute distress. Appearance: She is well-developed. HENT:      Head: Normocephalic and atraumatic. Eyes:      Conjunctiva/sclera: Conjunctivae normal.   Cardiovascular:      Rate and Rhythm: Normal rate. Pulmonary:      Effort: Pulmonary effort is normal. No respiratory distress. Genitourinary:     Exam position: Supine. Labia:         Right: No rash or lesion. Left: No rash or lesion. Vagina: Lesions (single lesion, raised, smooth, firm - pt's R side) present. Musculoskeletal:      Cervical back: Neck supple. Skin:     General: Skin is warm and dry. Findings: No lesion. Neurological:      Mental Status: She is alert and oriented to person, place, and time. Assessment:      1. Lump in vagina  -     [address] [facility] DO [place], Gynecology, [place]  2. Encounter for screening mammogram for breast cancer  -     DELICIA OSVALDO DIGITAL SCREEN BILATERAL; Future         Plan:      Return for already scheduled visit on 8/17.     Orders Placed This Encounter   Procedures    DELICIA OSVALDO DIGITAL SCREEN BILATERAL     Standing Status:   Future     Standing Expiration Date:   9/18/2023     Order Specific Question:   Reason for exam:     Answer:   screening for breast cancer    [address] [place] [address] DO, Gynecology, [place]     Referral Priority:   Routine     Referral Type:   Emergency Department     Referral Reason:   Specialty Services Required     Referred to Provider:   She Schmitt DO     Requested Specialty: Obstetrics & Gynecology     Number of Visits Requested:   1     No orders of the defined types were placed in this encounter. Patient given educational materials - see patient instructions. Discussed use, benefit, and side effects of prescribed medications. All patient questions answered. Pt voiced understanding. Reviewed health maintenance.             Electronically signed by Andrea Salas DO, DO on 8/8/2022 at 12:09 AM

## 2022-08-17 ENCOUNTER — HOSPITAL ENCOUNTER (OUTPATIENT)
Dept: MAMMOGRAPHY | Age: 67
Discharge: HOME | End: 2022-08-19
Payer: MEDICARE

## 2022-08-17 ENCOUNTER — OFFICE VISIT (OUTPATIENT)
Dept: FAMILY MEDICINE CLINIC | Age: 67
End: 2022-08-17
Payer: MEDICARE

## 2022-08-17 ENCOUNTER — HOSPITAL ENCOUNTER (OUTPATIENT)
Dept: LAB | Age: 67
Discharge: HOME | End: 2022-08-17
Payer: MEDICARE

## 2022-08-17 VITALS — HEIGHT: 62 IN | WEIGHT: 283 LBS | BODY MASS INDEX: 52.08 KG/M2

## 2022-08-17 VITALS
SYSTOLIC BLOOD PRESSURE: 130 MMHG | DIASTOLIC BLOOD PRESSURE: 86 MMHG | TEMPERATURE: 97.5 F | HEIGHT: 62 IN | HEART RATE: 72 BPM | WEIGHT: 282 LBS | OXYGEN SATURATION: 98 % | BODY MASS INDEX: 51.89 KG/M2

## 2022-08-17 DIAGNOSIS — Z78.0 POST-MENOPAUSAL: ICD-10-CM

## 2022-08-17 DIAGNOSIS — E03.9 HYPOTHYROIDISM, UNSPECIFIED TYPE: Primary | ICD-10-CM

## 2022-08-17 DIAGNOSIS — E03.9 HYPOTHYROIDISM, UNSPECIFIED TYPE: ICD-10-CM

## 2022-08-17 DIAGNOSIS — E78.5 HYPERLIPIDEMIA, UNSPECIFIED HYPERLIPIDEMIA TYPE: ICD-10-CM

## 2022-08-17 DIAGNOSIS — I10 ESSENTIAL (PRIMARY) HYPERTENSION: ICD-10-CM

## 2022-08-17 DIAGNOSIS — Z12.31 ENCOUNTER FOR SCREENING MAMMOGRAM FOR BREAST CANCER: ICD-10-CM

## 2022-08-17 DIAGNOSIS — E55.9 VITAMIN D DEFICIENCY: ICD-10-CM

## 2022-08-17 DIAGNOSIS — R73.01 IMPAIRED FASTING GLUCOSE: ICD-10-CM

## 2022-08-17 LAB
THYROXINE, FREE: 1.1 NG/DL (ref 0.93–1.7)
TSH SERPL DL<=0.05 MIU/L-ACNC: 3.59 UIU/ML (ref 0.3–5)

## 2022-08-17 PROCEDURE — G8427 DOCREV CUR MEDS BY ELIG CLIN: HCPCS | Performed by: FAMILY MEDICINE

## 2022-08-17 PROCEDURE — G8400 PT W/DXA NO RESULTS DOC: HCPCS | Performed by: FAMILY MEDICINE

## 2022-08-17 PROCEDURE — 84439 ASSAY OF FREE THYROXINE: CPT

## 2022-08-17 PROCEDURE — 3017F COLORECTAL CA SCREEN DOC REV: CPT | Performed by: FAMILY MEDICINE

## 2022-08-17 PROCEDURE — 1036F TOBACCO NON-USER: CPT | Performed by: FAMILY MEDICINE

## 2022-08-17 PROCEDURE — G8417 CALC BMI ABV UP PARAM F/U: HCPCS | Performed by: FAMILY MEDICINE

## 2022-08-17 PROCEDURE — 1090F PRES/ABSN URINE INCON ASSESS: CPT | Performed by: FAMILY MEDICINE

## 2022-08-17 PROCEDURE — 84443 ASSAY THYROID STIM HORMONE: CPT

## 2022-08-17 PROCEDURE — 1123F ACP DISCUSS/DSCN MKR DOCD: CPT | Performed by: FAMILY MEDICINE

## 2022-08-17 PROCEDURE — 99213 OFFICE O/P EST LOW 20 MIN: CPT | Performed by: FAMILY MEDICINE

## 2022-08-17 PROCEDURE — 99214 OFFICE O/P EST MOD 30 MIN: CPT | Performed by: FAMILY MEDICINE

## 2022-08-17 PROCEDURE — 77063 BREAST TOMOSYNTHESIS BI: CPT

## 2022-08-17 PROCEDURE — 36415 COLL VENOUS BLD VENIPUNCTURE: CPT

## 2022-08-17 NOTE — PROGRESS NOTES
UNKNOWN)      partial - both ovaries remain; done for uterine fibroid    OTHER SURGICAL HISTORY      fissure repair     Family History   Problem Relation Age of Onset    High Blood Pressure Mother     [de-identified] / Stillbirths Mother     Osteoporosis Mother     Heart Disease Father     High Blood Pressure Sister     Heart Disease Brother         MI    Cancer Brother         stomach or prostate    Heart Disease Brother         CABG in his late 40's/early 52's    Breast Cancer Maternal Grandmother     Cancer Maternal Grandmother         breast    Diabetes Paternal Grandmother     Lupus Other         Niece     Social History     Tobacco Use    Smoking status: Former     Packs/day: 0.00     Years: 40.00     Pack years: 0.00     Types: Cigarettes     Start date: 2014     Quit date: 2016     Years since quittin.3    Smokeless tobacco: Never   Substance Use Topics    Alcohol use: Yes     Comment: very little drinking  just once in a while      Current Outpatient Medications   Medication Sig Dispense Refill    atorvastatin (LIPITOR) 20 MG tablet TAKE 1 TABLET DAILY 90 tablet 2    Cholecalciferol (D3-1000 PO) Take by mouth      levothyroxine (SYNTHROID) 25 MCG tablet Take 0.5 tablets by mouth Daily 0.1 tablet 0    buPROPion (WELLBUTRIN XL) 300 MG extended release tablet TAKE 1 TABLET EVERY MORNING 90 tablet 3    atenolol (TENORMIN) 50 MG tablet TAKE 1 TABLET DAILY 90 tablet 3    losartan (COZAAR) 25 MG tablet TAKE 1 TABLET DAILY 90 tablet 3    furosemide (LASIX) 20 MG tablet TAKE 1 TABLET DAILY 90 tablet 3     No current facility-administered medications for this visit.      Allergies   Allergen Reactions    Benazepril Other (See Comments)     cough    Dynabac [Dirithromycin]     Lisinopril Other (See Comments)     Cough      Other      Generic Prozac       Health Maintenance   Topic Date Due    DEXA (modify frequency per FRAX score)  Never done    COVID-19 Vaccine (2 - Pfizer series) 2021    Flu vaccine (1) 09/01/2022    Annual Wellness Visit (AWV)  11/24/2022    Lipids  03/09/2023    Depression Monitoring  07/18/2023    Colorectal Cancer Screen  05/22/2024    Breast cancer screen  08/17/2024    Diabetes screen  03/09/2025    DTaP/Tdap/Td vaccine (4 - Td or Tdap) 11/06/2029    Shingles vaccine  Completed    Pneumococcal 65+ years Vaccine  Completed    Hepatitis C screen  Completed    Hepatitis A vaccine  Aged Out    Hepatitis B vaccine  Aged Out    Hib vaccine  Aged Out    Meningococcal (ACWY) vaccine  Aged Out       Subjective:      Review of Systems   Constitutional:  Negative for unexpected weight change. HENT:  Dental problem: just had dental work done today, and still sore/numb. Psychiatric/Behavioral:  The patient is nervous/anxious (stable). Objective:     Vitals:    08/17/22 1357   BP: 130/86   Site: Right Upper Arm   Position: Sitting   Cuff Size: Large Adult   Pulse: 72   Temp: 97.5 °F (36.4 °C)   TempSrc: Temporal   SpO2: 98%   Weight: 282 lb (127.9 kg)   Height: 5' 2\" (1.575 m)     Physical Exam  Constitutional:       General: She is not in acute distress. Appearance: Normal appearance. HENT:      Head: Normocephalic and atraumatic. Eyes:      Conjunctiva/sclera: Conjunctivae normal.   Cardiovascular:      Rate and Rhythm: Normal rate and regular rhythm. Heart sounds: Normal heart sounds. Pulmonary:      Effort: Pulmonary effort is normal. No respiratory distress. Breath sounds: Normal breath sounds. Abdominal:      General: Bowel sounds are normal. There is no distension. Palpations: Abdomen is soft. Tenderness: There is no abdominal tenderness. Musculoskeletal:      Right lower leg: No edema. Left lower leg: No edema. Skin:     General: Skin is warm and dry. Neurological:      General: No focal deficit present. Mental Status: She is alert and oriented to person, place, and time.    Psychiatric:         Mood and Affect: Mood normal. Assessment:      1. Hypothyroidism, unspecified type  2. Hyperlipidemia, unspecified hyperlipidemia type  3. Essential (primary) hypertension  4. Impaired fasting glucose  5. Vitamin D deficiency  6. Post-menopausal  -     DEXA Bone Density Axial Skeleton; Future         Plan:      Return in about 7 months (around 3/17/2023) for f/u HTN, anxiety. Orders Placed This Encounter   Procedures    DEXA Bone Density Axial Skeleton     Standing Status:   Future     Standing Expiration Date:   8/17/2023     Order Specific Question:   Reason for exam:     Answer:   post-menopausal     No orders of the defined types were placed in this encounter. Patient given educational materials - see patient instructions. Discussed use, benefit, and side effects of prescribed medications. All patient questions answered. Pt voiced understanding. Reviewed health maintenance.             Electronically signed by Abdoul Huertas DO, DO on 8/28/2022 at 11:46 PM

## 2022-12-02 ENCOUNTER — TELEMEDICINE (OUTPATIENT)
Dept: FAMILY MEDICINE CLINIC | Age: 67
End: 2022-12-02
Payer: MEDICARE

## 2022-12-02 DIAGNOSIS — Z00.00 MEDICARE ANNUAL WELLNESS VISIT, SUBSEQUENT: Primary | ICD-10-CM

## 2022-12-02 PROCEDURE — G8484 FLU IMMUNIZE NO ADMIN: HCPCS | Performed by: FAMILY MEDICINE

## 2022-12-02 PROCEDURE — G0439 PPPS, SUBSEQ VISIT: HCPCS | Performed by: FAMILY MEDICINE

## 2022-12-02 PROCEDURE — 1123F ACP DISCUSS/DSCN MKR DOCD: CPT | Performed by: FAMILY MEDICINE

## 2022-12-02 PROCEDURE — 3017F COLORECTAL CA SCREEN DOC REV: CPT | Performed by: FAMILY MEDICINE

## 2022-12-02 SDOH — ECONOMIC STABILITY: FOOD INSECURITY: WITHIN THE PAST 12 MONTHS, YOU WORRIED THAT YOUR FOOD WOULD RUN OUT BEFORE YOU GOT MONEY TO BUY MORE.: NEVER TRUE

## 2022-12-02 SDOH — ECONOMIC STABILITY: FOOD INSECURITY: WITHIN THE PAST 12 MONTHS, THE FOOD YOU BOUGHT JUST DIDN'T LAST AND YOU DIDN'T HAVE MONEY TO GET MORE.: NEVER TRUE

## 2022-12-02 ASSESSMENT — PATIENT HEALTH QUESTIONNAIRE - PHQ9
4. FEELING TIRED OR HAVING LITTLE ENERGY: 0
7. TROUBLE CONCENTRATING ON THINGS, SUCH AS READING THE NEWSPAPER OR WATCHING TELEVISION: 0
10. IF YOU CHECKED OFF ANY PROBLEMS, HOW DIFFICULT HAVE THESE PROBLEMS MADE IT FOR YOU TO DO YOUR WORK, TAKE CARE OF THINGS AT HOME, OR GET ALONG WITH OTHER PEOPLE: 0
SUM OF ALL RESPONSES TO PHQ QUESTIONS 1-9: 3
2. FEELING DOWN, DEPRESSED OR HOPELESS: 1
8. MOVING OR SPEAKING SO SLOWLY THAT OTHER PEOPLE COULD HAVE NOTICED. OR THE OPPOSITE, BEING SO FIGETY OR RESTLESS THAT YOU HAVE BEEN MOVING AROUND A LOT MORE THAN USUAL: 0
9. THOUGHTS THAT YOU WOULD BE BETTER OFF DEAD, OR OF HURTING YOURSELF: 0
6. FEELING BAD ABOUT YOURSELF - OR THAT YOU ARE A FAILURE OR HAVE LET YOURSELF OR YOUR FAMILY DOWN: 1
SUM OF ALL RESPONSES TO PHQ QUESTIONS 1-9: 3
5. POOR APPETITE OR OVEREATING: 0
SUM OF ALL RESPONSES TO PHQ QUESTIONS 1-9: 3
3. TROUBLE FALLING OR STAYING ASLEEP: 1
SUM OF ALL RESPONSES TO PHQ QUESTIONS 1-9: 3

## 2022-12-02 ASSESSMENT — SOCIAL DETERMINANTS OF HEALTH (SDOH): HOW HARD IS IT FOR YOU TO PAY FOR THE VERY BASICS LIKE FOOD, HOUSING, MEDICAL CARE, AND HEATING?: NOT HARD AT ALL

## 2022-12-02 ASSESSMENT — LIFESTYLE VARIABLES
HOW OFTEN DO YOU HAVE A DRINK CONTAINING ALCOHOL: MONTHLY OR LESS
HOW MANY STANDARD DRINKS CONTAINING ALCOHOL DO YOU HAVE ON A TYPICAL DAY: 1 OR 2

## 2022-12-02 NOTE — PATIENT INSTRUCTIONS
Personalized Preventive Plan for Huma Peguero - 12/2/2022  Medicare offers a range of preventive health benefits. Some of the tests and screenings are paid in full while other may be subject to a deductible, co-insurance, and/or copay. Some of these benefits include a comprehensive review of your medical history including lifestyle, illnesses that may run in your family, and various assessments and screenings as appropriate. After reviewing your medical record and screening and assessments performed today your provider may have ordered immunizations, labs, imaging, and/or referrals for you. A list of these orders (if applicable) as well as your Preventive Care list are included within your After Visit Summary for your review. Other Preventive Recommendations:    A preventive eye exam performed by an eye specialist is recommended every 1-2 years to screen for glaucoma; cataracts, macular degeneration, and other eye disorders. A preventive dental visit is recommended every 6 months. Try to get at least 150 minutes of exercise per week or 10,000 steps per day on a pedometer . Order or download the FREE \"Exercise & Physical Activity: Your Everyday Guide\" from [employer]. Call 6-612.774.8053 or search [employer] online. You need 9010-0017 mg of calcium and 4116-2423 IU of vitamin D per day. It is possible to meet your calcium requirement with diet alone, but a vitamin D supplement is usually necessary to meet this goal.  When exposed to the sun, use a sunscreen that protects against both UVA and UVB radiation with an SPF of 30 or greater. Reapply every 2 to 3 hours or after sweating, drying off with a towel, or swimming. Always wear a seat belt when traveling in a car. Always wear a helmet when riding a bicycle or motorcycle.

## 2022-12-05 DIAGNOSIS — E55.9 VITAMIN D DEFICIENCY: Primary | ICD-10-CM

## 2022-12-05 DIAGNOSIS — R73.01 IMPAIRED FASTING GLUCOSE: ICD-10-CM

## 2022-12-05 DIAGNOSIS — E03.9 HYPOTHYROIDISM, UNSPECIFIED TYPE: ICD-10-CM

## 2022-12-05 DIAGNOSIS — I10 ESSENTIAL HYPERTENSION: ICD-10-CM

## 2022-12-05 DIAGNOSIS — E78.5 HYPERLIPIDEMIA, UNSPECIFIED HYPERLIPIDEMIA TYPE: ICD-10-CM

## 2022-12-08 RX ORDER — LEVOTHYROXINE SODIUM 0.03 MG/1
12.5 TABLET ORAL DAILY
Qty: 45 TABLET | Refills: 0 | Status: SHIPPED | OUTPATIENT
Start: 2022-12-08

## 2022-12-08 RX ORDER — ATENOLOL 50 MG/1
TABLET ORAL
Qty: 90 TABLET | Refills: 3 | Status: SHIPPED | OUTPATIENT
Start: 2022-12-08

## 2022-12-08 RX ORDER — LOSARTAN POTASSIUM 25 MG/1
TABLET ORAL
Qty: 90 TABLET | Refills: 3 | Status: SHIPPED | OUTPATIENT
Start: 2022-12-08

## 2022-12-08 NOTE — TELEPHONE ENCOUNTER
Noted - thank you. She will be due for re-check of thyroid levels with other labwork right before next OV on 3/17/23. Please confirm what dose pt is currently taking of Synthroid - 1/2 of a 25 mcg tab daily or a full tab?

## 2023-01-17 DIAGNOSIS — E78.5 HYPERLIPIDEMIA, UNSPECIFIED HYPERLIPIDEMIA TYPE: ICD-10-CM

## 2023-01-17 NOTE — TELEPHONE ENCOUNTER
Osmel Crew called requesting a refill of the below medication which has been pended for you:     Requested Prescriptions     Pending Prescriptions Disp Refills    atorvastatin (LIPITOR) 20 MG tablet 90 tablet 2       Last Appointment Date: 12/2/2022  Next Appointment Date: 3/17/2023    Allergies   Allergen Reactions    Benazepril Other (See Comments)     cough    Dynabac [Dirithromycin]     Lisinopril Other (See Comments)     Cough      Other      Generic Prozac

## 2023-01-19 RX ORDER — ATORVASTATIN CALCIUM 20 MG/1
20 TABLET, FILM COATED ORAL DAILY
Qty: 90 TABLET | Refills: 0 | Status: SHIPPED | OUTPATIENT
Start: 2023-01-19

## 2023-04-14 DIAGNOSIS — E78.5 HYPERLIPIDEMIA, UNSPECIFIED HYPERLIPIDEMIA TYPE: ICD-10-CM

## 2023-04-17 NOTE — TELEPHONE ENCOUNTER
Patient scheduled for 5/9/23 at 1020.  She states that she will have her lab work done on 5/3/23 when she is coming in for her next appointment with OBGYN

## 2023-04-19 DIAGNOSIS — I10 ESSENTIAL HYPERTENSION: ICD-10-CM

## 2023-04-19 DIAGNOSIS — E78.5 HYPERLIPIDEMIA, UNSPECIFIED HYPERLIPIDEMIA TYPE: ICD-10-CM

## 2023-04-19 RX ORDER — ATORVASTATIN CALCIUM 20 MG/1
20 TABLET, FILM COATED ORAL DAILY
Qty: 90 TABLET | Refills: 0 | Status: SHIPPED | OUTPATIENT
Start: 2023-04-19 | End: 2023-04-21 | Stop reason: SDUPTHER

## 2023-04-19 NOTE — TELEPHONE ENCOUNTER
Verified with pt that she needs meds sent to [facility] instead of [facility]. Pt also requesting refill of other meds, as well. Please advise.

## 2023-04-19 NOTE — TELEPHONE ENCOUNTER
David Adams called requesting a refill of the below medication which has been pended for you:     Requested Prescriptions     Pending Prescriptions Disp Refills    atorvastatin (LIPITOR) 20 MG tablet 90 tablet 0     Sig: Take 1 tablet by mouth daily    atenolol (TENORMIN) 50 MG tablet 90 tablet 3     Sig: Take 1 tablet by mouth daily    losartan (COZAAR) 25 MG tablet 90 tablet 3     Sig: Take 1 tablet by mouth daily       Last Appointment Date: 12/2/2022  Next Appointment Date: 5/9/2023    Allergies   Allergen Reactions    Benazepril Other (See Comments)     cough    Dynabac [Dirithromycin]     Lisinopril Other (See Comments)     Cough      Other      Generic Prozac

## 2023-04-19 NOTE — TELEPHONE ENCOUNTER
----- Message from Josh Jackson sent at 4/19/2023  1:02 PM EDT -----  Subject: Message to Provider    QUESTIONS  Information for Provider? Patient said refill for Atorvastatin went to [facility] instead of [facility], can you resend   ---------------------------------------------------------------------------  --------------  Ney Hutchison INFO  [phone]; OK to leave message on voicemail  ---------------------------------------------------------------------------  --------------  SCRIPT ANSWERS  Relationship to Patient?  Self

## 2023-04-21 RX ORDER — LOSARTAN POTASSIUM 25 MG/1
25 TABLET ORAL DAILY
Qty: 90 TABLET | Refills: 3 | Status: SHIPPED | OUTPATIENT
Start: 2023-04-21

## 2023-04-21 RX ORDER — ATENOLOL 50 MG/1
50 TABLET ORAL DAILY
Qty: 90 TABLET | Refills: 3 | Status: SHIPPED | OUTPATIENT
Start: 2023-04-21

## 2023-04-21 RX ORDER — ATORVASTATIN CALCIUM 20 MG/1
20 TABLET, FILM COATED ORAL DAILY
Qty: 90 TABLET | Refills: 0 | Status: SHIPPED | OUTPATIENT
Start: 2023-04-21

## 2023-05-03 ENCOUNTER — OFFICE VISIT (OUTPATIENT)
Dept: OBGYN | Age: 68
End: 2023-05-03
Payer: MEDICARE

## 2023-05-03 ENCOUNTER — TELEPHONE (OUTPATIENT)
Dept: SURGERY | Age: 68
End: 2023-05-03

## 2023-05-03 ENCOUNTER — HOSPITAL ENCOUNTER (OUTPATIENT)
Age: 68
Discharge: HOME | End: 2023-05-03
Payer: MEDICARE

## 2023-05-03 VITALS
BODY MASS INDEX: 53.92 KG/M2 | DIASTOLIC BLOOD PRESSURE: 80 MMHG | HEART RATE: 80 BPM | SYSTOLIC BLOOD PRESSURE: 124 MMHG | WEIGHT: 293 LBS | HEIGHT: 62 IN

## 2023-05-03 DIAGNOSIS — I10 PRIMARY HYPERTENSION: ICD-10-CM

## 2023-05-03 DIAGNOSIS — Z78.0 POST-MENOPAUSAL: ICD-10-CM

## 2023-05-03 DIAGNOSIS — I50.32 CHRONIC DIASTOLIC HEART FAILURE (HCC): ICD-10-CM

## 2023-05-03 DIAGNOSIS — I10 ESSENTIAL HYPERTENSION: ICD-10-CM

## 2023-05-03 DIAGNOSIS — R73.01 IMPAIRED FASTING GLUCOSE: ICD-10-CM

## 2023-05-03 DIAGNOSIS — E78.5 HYPERLIPIDEMIA, UNSPECIFIED HYPERLIPIDEMIA TYPE: ICD-10-CM

## 2023-05-03 DIAGNOSIS — E55.9 VITAMIN D DEFICIENCY: ICD-10-CM

## 2023-05-03 DIAGNOSIS — Z13.820 SCREENING FOR OSTEOPOROSIS: ICD-10-CM

## 2023-05-03 DIAGNOSIS — Z12.31 ENCOUNTER FOR SCREENING MAMMOGRAM FOR MALIGNANT NEOPLASM OF BREAST: ICD-10-CM

## 2023-05-03 DIAGNOSIS — Z12.11 SCREEN FOR COLON CANCER: ICD-10-CM

## 2023-05-03 DIAGNOSIS — N81.6 RECTOCELE: ICD-10-CM

## 2023-05-03 DIAGNOSIS — Z12.4 SCREENING FOR MALIGNANT NEOPLASM OF CERVIX: ICD-10-CM

## 2023-05-03 DIAGNOSIS — E03.9 HYPOTHYROIDISM, UNSPECIFIED TYPE: ICD-10-CM

## 2023-05-03 DIAGNOSIS — N89.8 VAGINAL MASS: Primary | ICD-10-CM

## 2023-05-03 LAB
ABSOLUTE EOS #: 0.12 K/UL (ref 0–0.44)
ABSOLUTE IMMATURE GRANULOCYTE: <0.03 K/UL (ref 0–0.3)
ABSOLUTE LYMPH #: 2.06 K/UL (ref 1.1–3.7)
ABSOLUTE MONO #: 0.6 K/UL (ref 0.1–1.2)
ALBUMIN SERPL-MCNC: 4.2 G/DL (ref 3.5–5.2)
ALBUMIN/GLOBULIN RATIO: 1.3 (ref 1–2.5)
ALP SERPL-CCNC: 91 U/L (ref 35–104)
ALT SERPL-CCNC: 14 U/L (ref 5–33)
ANION GAP SERPL CALCULATED.3IONS-SCNC: 10 MMOL/L (ref 9–17)
AST SERPL-CCNC: 20 U/L
BASOPHILS # BLD: 0 % (ref 0–2)
BASOPHILS ABSOLUTE: 0.03 K/UL (ref 0–0.2)
BILIRUB SERPL-MCNC: 0.5 MG/DL (ref 0.3–1.2)
BUN SERPL-MCNC: 18 MG/DL (ref 8–23)
BUN/CREAT BLD: 20 (ref 9–20)
CALCIUM SERPL-MCNC: 10 MG/DL (ref 8.6–10.4)
CHLORIDE SERPL-SCNC: 103 MMOL/L (ref 98–107)
CO2 SERPL-SCNC: 28 MMOL/L (ref 20–31)
CREAT SERPL-MCNC: 0.89 MG/DL (ref 0.5–0.9)
EOSINOPHILS RELATIVE PERCENT: 2 % (ref 1–4)
GFR SERPL CREATININE-BSD FRML MDRD: >60 ML/MIN/1.73M2
GLUCOSE SERPL-MCNC: 107 MG/DL (ref 70–99)
HCT VFR BLD AUTO: 45.8 % (ref 36.3–47.1)
HGB BLD-MCNC: 14.6 G/DL (ref 11.9–15.1)
IMMATURE GRANULOCYTES: 0 %
LYMPHOCYTES # BLD: 30 % (ref 24–43)
MCH RBC QN AUTO: 28.8 PG (ref 25.2–33.5)
MCHC RBC AUTO-ENTMCNC: 31.9 G/DL (ref 25.2–33.5)
MCV RBC AUTO: 90.3 FL (ref 82.6–102.9)
MONOCYTES # BLD: 9 % (ref 3–12)
NRBC AUTOMATED: 0 PER 100 WBC
PDW BLD-RTO: 13.3 % (ref 11.8–14.4)
PLATELET # BLD AUTO: 258 K/UL (ref 138–453)
PMV BLD AUTO: 11.3 FL (ref 8.1–13.5)
POTASSIUM SERPL-SCNC: 4.8 MMOL/L (ref 3.7–5.3)
PROT SERPL-MCNC: 7.4 G/DL (ref 6.4–8.3)
RBC # BLD: 5.07 M/UL (ref 3.95–5.11)
SEG NEUTROPHILS: 59 % (ref 36–65)
SEGMENTED NEUTROPHILS ABSOLUTE COUNT: 3.98 K/UL (ref 1.5–8.1)
SODIUM SERPL-SCNC: 141 MMOL/L (ref 135–144)
TSH SERPL-ACNC: 4.57 UIU/ML (ref 0.3–5)
WBC # BLD AUTO: 6.8 K/UL (ref 3.5–11.3)

## 2023-05-03 PROCEDURE — 3079F DIAST BP 80-89 MM HG: CPT | Performed by: OBSTETRICS & GYNECOLOGY

## 2023-05-03 PROCEDURE — 3074F SYST BP LT 130 MM HG: CPT | Performed by: OBSTETRICS & GYNECOLOGY

## 2023-05-03 PROCEDURE — G8417 CALC BMI ABV UP PARAM F/U: HCPCS | Performed by: OBSTETRICS & GYNECOLOGY

## 2023-05-03 PROCEDURE — 83036 HEMOGLOBIN GLYCOSYLATED A1C: CPT

## 2023-05-03 PROCEDURE — 1090F PRES/ABSN URINE INCON ASSESS: CPT | Performed by: OBSTETRICS & GYNECOLOGY

## 2023-05-03 PROCEDURE — 1123F ACP DISCUSS/DSCN MKR DOCD: CPT | Performed by: OBSTETRICS & GYNECOLOGY

## 2023-05-03 PROCEDURE — 80053 COMPREHEN METABOLIC PANEL: CPT

## 2023-05-03 PROCEDURE — 82306 VITAMIN D 25 HYDROXY: CPT

## 2023-05-03 PROCEDURE — 1036F TOBACCO NON-USER: CPT | Performed by: OBSTETRICS & GYNECOLOGY

## 2023-05-03 PROCEDURE — 99204 OFFICE O/P NEW MOD 45 MIN: CPT | Performed by: OBSTETRICS & GYNECOLOGY

## 2023-05-03 PROCEDURE — G8400 PT W/DXA NO RESULTS DOC: HCPCS | Performed by: OBSTETRICS & GYNECOLOGY

## 2023-05-03 PROCEDURE — 36415 COLL VENOUS BLD VENIPUNCTURE: CPT

## 2023-05-03 PROCEDURE — 3017F COLORECTAL CA SCREEN DOC REV: CPT | Performed by: OBSTETRICS & GYNECOLOGY

## 2023-05-03 PROCEDURE — G8428 CUR MEDS NOT DOCUMENT: HCPCS | Performed by: OBSTETRICS & GYNECOLOGY

## 2023-05-03 PROCEDURE — 99204 OFFICE O/P NEW MOD 45 MIN: CPT

## 2023-05-03 PROCEDURE — 84443 ASSAY THYROID STIM HORMONE: CPT

## 2023-05-03 PROCEDURE — 80061 LIPID PANEL: CPT

## 2023-05-03 PROCEDURE — 85025 COMPLETE CBC W/AUTO DIFF WBC: CPT

## 2023-05-03 RX ORDER — LORAZEPAM 1 MG/1
1 TABLET ORAL PRN
COMMUNITY

## 2023-05-03 NOTE — PROGRESS NOTES
Standing Expiration Date:   5/3/2024     Order Specific Question:   Reason for exam:     Answer:   osteoporosis screen    MRI PELVIS W WO CONTRAST     Standing Status:   Future     Standing Expiration Date:   5/3/2024     Order Specific Question:   STAT Creatinine as needed:     Answer:   Yes     Order Specific Question:   Reason for exam:     Answer:   Right vaginal wall lesion 6-12 mm     Order Specific Question:   What is the sedation requirement? Answer:   Paris Brandon MD, General Surgery, [place]     Referral Priority:   Routine     Referral Type:   Eval and Treat     Referral Reason:   Specialty Services Required     Referred to Provider:   Saranya Torres MD     Requested Specialty:   General Surgery     Number of Visits Requested:   1           The patient, Kvng Lewis is a 75 y.o. female, was seen with a total time spent of 45 minutes for the visit on this date of service by the E/M provider. The time component had both face to face and non face to face time spent in determining the total time component. Counseling and education regarding her diagnosis listed below and her options regarding those diagnoses were also included in determining her time component. Diagnosis Orders   1. Vaginal mass  MRI PELVIS W WO CONTRAST      2. Rectocele grade 2        3. Encounter for screening mammogram for malignant neoplasm of breast  DELICIA OSVALDO DIGITAL SCREEN BILATERAL      4. Screening for malignant neoplasm of cervix        5. Screen for colon cancer  Sonal Bennett MD, General Surgery, [place]      6. Screening for osteoporosis  DEXA BONE DENSITY AXIAL SKELETON      7. Primary hypertension        8. Chronic diastolic heart failure ([address]             The patient had her preventative health maintenance recommendations and follow-up reviewed with her at the completion of her visit.

## 2023-05-04 LAB
25(OH)D3 SERPL-MCNC: 38.7 NG/ML
CHOLEST SERPL-MCNC: 202 MG/DL
CHOLESTEROL/HDL RATIO: 5.3
EST. AVERAGE GLUCOSE BLD GHB EST-MCNC: 108 MG/DL
HBA1C MFR BLD: 5.4 % (ref 4–6)
HDLC SERPL-MCNC: 38 MG/DL
LDLC SERPL CALC-MCNC: 132 MG/DL (ref 0–130)
TRIGL SERPL-MCNC: 160 MG/DL

## 2023-05-09 ENCOUNTER — OFFICE VISIT (OUTPATIENT)
Dept: FAMILY MEDICINE CLINIC | Age: 68
End: 2023-05-09
Payer: MEDICARE

## 2023-05-09 VITALS
BODY MASS INDEX: 53.92 KG/M2 | SYSTOLIC BLOOD PRESSURE: 134 MMHG | WEIGHT: 293 LBS | OXYGEN SATURATION: 98 % | RESPIRATION RATE: 17 BRPM | DIASTOLIC BLOOD PRESSURE: 92 MMHG | HEART RATE: 77 BPM | HEIGHT: 62 IN | TEMPERATURE: 97.3 F

## 2023-05-09 DIAGNOSIS — S63.501A SPRAIN OF RIGHT WRIST, INITIAL ENCOUNTER: ICD-10-CM

## 2023-05-09 DIAGNOSIS — E55.9 VITAMIN D DEFICIENCY: ICD-10-CM

## 2023-05-09 DIAGNOSIS — I10 ESSENTIAL HYPERTENSION: Primary | ICD-10-CM

## 2023-05-09 DIAGNOSIS — M54.50 ACUTE BILATERAL LOW BACK PAIN WITHOUT SCIATICA: ICD-10-CM

## 2023-05-09 DIAGNOSIS — E03.9 HYPOTHYROIDISM, UNSPECIFIED TYPE: ICD-10-CM

## 2023-05-09 DIAGNOSIS — N94.9 LUMP IN VAGINA: ICD-10-CM

## 2023-05-09 PROCEDURE — G8417 CALC BMI ABV UP PARAM F/U: HCPCS | Performed by: FAMILY MEDICINE

## 2023-05-09 PROCEDURE — 3074F SYST BP LT 130 MM HG: CPT | Performed by: FAMILY MEDICINE

## 2023-05-09 PROCEDURE — 3078F DIAST BP <80 MM HG: CPT | Performed by: FAMILY MEDICINE

## 2023-05-09 PROCEDURE — 3017F COLORECTAL CA SCREEN DOC REV: CPT | Performed by: FAMILY MEDICINE

## 2023-05-09 PROCEDURE — 99214 OFFICE O/P EST MOD 30 MIN: CPT | Performed by: FAMILY MEDICINE

## 2023-05-09 PROCEDURE — 1036F TOBACCO NON-USER: CPT | Performed by: FAMILY MEDICINE

## 2023-05-09 PROCEDURE — 99213 OFFICE O/P EST LOW 20 MIN: CPT | Performed by: FAMILY MEDICINE

## 2023-05-09 PROCEDURE — G8428 CUR MEDS NOT DOCUMENT: HCPCS | Performed by: FAMILY MEDICINE

## 2023-05-09 PROCEDURE — 1123F ACP DISCUSS/DSCN MKR DOCD: CPT | Performed by: FAMILY MEDICINE

## 2023-05-09 PROCEDURE — G8399 PT W/DXA RESULTS DOCUMENT: HCPCS | Performed by: FAMILY MEDICINE

## 2023-05-09 PROCEDURE — 1090F PRES/ABSN URINE INCON ASSESS: CPT | Performed by: FAMILY MEDICINE

## 2023-05-09 RX ORDER — PREDNISONE 20 MG/1
TABLET ORAL
Qty: 13 TABLET | Refills: 0 | Status: ON HOLD | OUTPATIENT
Start: 2023-05-09 | End: 2023-05-24

## 2023-05-11 ENCOUNTER — TELEPHONE (OUTPATIENT)
Dept: SURGERY | Age: 68
End: 2023-05-11

## 2023-05-11 RX ORDER — BISACODYL 5 MG/1
TABLET, DELAYED RELEASE ORAL
Qty: 2 TABLET | Refills: 0 | Status: SHIPPED | OUTPATIENT
Start: 2023-05-11

## 2023-05-11 RX ORDER — POLYETHYLENE GLYCOL 3350, SODIUM CHLORIDE, SODIUM BICARBONATE, POTASSIUM CHLORIDE 420; 11.2; 5.72; 1.48 G/4L; G/4L; G/4L; G/4L
POWDER, FOR SOLUTION ORAL
Qty: 4000 ML | Refills: 0 | Status: SHIPPED | OUTPATIENT
Start: 2023-05-11

## 2023-05-11 NOTE — TELEPHONE ENCOUNTER
[facility] Mail In Colonoscopy Worksheet    Patient Name: Mohinder Cervantes  : 1955  Primary Care Physician: Wolf Dozier, DO  Today's Date: 2023    Surgery Location:   [x][place] [] [place] [] [facility] [place] [] Other    Why has a Colonoscopy been recommended for you? Referred by Dr. Dalia Oliveira for screening. Was seen on 5/3/23 for vaginal mass & rectocele grade 2. Have had a colonoscopy before? [x] Yes  [] No    If so, where and when was that done?   - , unable to find report. Patient states it was done here @ [address] [facility] by Dr. Andre Bone? Negative cologuard 21     Was anything found during the last scope?  - unknown    Do you have a family history of colon cancer?   - No    Family History   Problem Relation Age of Onset    High Blood Pressure Mother     Miscarriages / Stillbirths Mother     Osteoporosis Mother     Heart Disease Father     High Blood Pressure Sister     Heart Disease Brother         MI    Cancer Brother         stomach or prostate    Heart Disease Brother         CABG in his late 40's/early 52's    Breast Cancer Maternal Grandmother     Cancer Maternal Grandmother         breast    Diabetes Paternal Grandmother     Cancer Granddaughter     Lupus Niece     Cancer Nephew        How much caffeine do you drink in a day? - Approximately 95 mg (once cup of coffee a day)    Any tobacco use? [] Yes    [x] No    Any alcohol use? [x] Yes    [] No  If yes, how often? Has one drink a week if that, does use THC edible gummies & brownies. Do you have any medication allergies? [x] Yes   [] No   If yes, please list: Benazepril, Dynbac, Lisinopril, Generix Prozac    Do you take Warfarin, Coumadin, Plavix, Eliquis, Xarelto, or aspirin OR do you take a medication that thins your blood?   [] Yes  [x] No    Medications:    Current Outpatient Medications on File Prior to Visit   Medication Sig Dispense Refill    predniSONE (DELTASONE) 20 MG tablet Take 2 tabs by mouth daily x 5

## 2023-05-16 ENCOUNTER — HOSPITAL ENCOUNTER (OUTPATIENT)
Dept: BONE DENSITY | Age: 68
Discharge: HOME | End: 2023-05-18
Payer: MEDICARE

## 2023-05-16 ENCOUNTER — HOSPITAL ENCOUNTER (OUTPATIENT)
Dept: MRI IMAGING | Age: 68
Discharge: HOME | End: 2023-05-18
Payer: MEDICARE

## 2023-05-16 DIAGNOSIS — Z78.0 POST-MENOPAUSAL: ICD-10-CM

## 2023-05-16 DIAGNOSIS — N89.8 VAGINAL MASS: ICD-10-CM

## 2023-05-16 DIAGNOSIS — Z13.820 SCREENING FOR OSTEOPOROSIS: ICD-10-CM

## 2023-05-16 PROCEDURE — 6360000004 HC RX CONTRAST MEDICATION: Performed by: OBSTETRICS & GYNECOLOGY

## 2023-05-16 PROCEDURE — 77080 DXA BONE DENSITY AXIAL: CPT

## 2023-05-16 PROCEDURE — A9577 INJ MULTIHANCE: HCPCS | Performed by: OBSTETRICS & GYNECOLOGY

## 2023-05-16 PROCEDURE — 72197 MRI PELVIS W/O & W/DYE: CPT

## 2023-05-16 RX ADMIN — GADOBENATE DIMEGLUMINE 15 ML: 529 INJECTION, SOLUTION INTRAVENOUS at 11:05

## 2023-05-22 ENCOUNTER — TELEPHONE (OUTPATIENT)
Dept: SURGERY | Age: 68
End: 2023-05-22

## 2023-05-23 PROBLEM — Z12.11 COLON CANCER SCREENING: Status: ACTIVE | Noted: 2023-05-23

## 2023-05-24 ENCOUNTER — HOSPITAL ENCOUNTER (OUTPATIENT)
Age: 68
Setting detail: OUTPATIENT SURGERY
Discharge: HOME | End: 2023-05-24
Attending: SURGERY | Admitting: SURGERY
Payer: MEDICARE

## 2023-05-24 ENCOUNTER — ANESTHESIA (OUTPATIENT)
Dept: OPERATING ROOM | Age: 68
End: 2023-05-24
Payer: MEDICARE

## 2023-05-24 ENCOUNTER — ANESTHESIA EVENT (OUTPATIENT)
Dept: OPERATING ROOM | Age: 68
End: 2023-05-24
Payer: MEDICARE

## 2023-05-24 VITALS
HEART RATE: 72 BPM | TEMPERATURE: 96.7 F | SYSTOLIC BLOOD PRESSURE: 106 MMHG | BODY MASS INDEX: 53.92 KG/M2 | OXYGEN SATURATION: 98 % | RESPIRATION RATE: 16 BRPM | HEIGHT: 62 IN | WEIGHT: 293 LBS | DIASTOLIC BLOOD PRESSURE: 71 MMHG

## 2023-05-24 DIAGNOSIS — Z12.11 SCREENING FOR COLON CANCER: ICD-10-CM

## 2023-05-24 PROCEDURE — 2500000003 HC RX 250 WO HCPCS: Performed by: REGISTERED NURSE

## 2023-05-24 PROCEDURE — 7100000011 HC PHASE II RECOVERY - ADDTL 15 MIN: Performed by: SURGERY

## 2023-05-24 PROCEDURE — 3609027000 HC COLONOSCOPY: Performed by: SURGERY

## 2023-05-24 PROCEDURE — 2580000003 HC RX 258: Performed by: SURGERY

## 2023-05-24 PROCEDURE — 3700000000 HC ANESTHESIA ATTENDED CARE: Performed by: SURGERY

## 2023-05-24 PROCEDURE — 2709999900 HC NON-CHARGEABLE SUPPLY: Performed by: SURGERY

## 2023-05-24 PROCEDURE — 3700000001 HC ADD 15 MINUTES (ANESTHESIA): Performed by: SURGERY

## 2023-05-24 PROCEDURE — 88305 TISSUE EXAM BY PATHOLOGIST: CPT

## 2023-05-24 PROCEDURE — 6360000002 HC RX W HCPCS: Performed by: REGISTERED NURSE

## 2023-05-24 PROCEDURE — 45380 COLONOSCOPY AND BIOPSY: CPT | Performed by: SURGERY

## 2023-05-24 PROCEDURE — 7100000010 HC PHASE II RECOVERY - FIRST 15 MIN: Performed by: SURGERY

## 2023-05-24 RX ORDER — LIDOCAINE HYDROCHLORIDE 20 MG/ML
INJECTION, SOLUTION INFILTRATION; PERINEURAL PRN
Status: DISCONTINUED | OUTPATIENT
Start: 2023-05-24 | End: 2023-05-24 | Stop reason: SDUPTHER

## 2023-05-24 RX ORDER — PROPOFOL 10 MG/ML
INJECTION, EMULSION INTRAVENOUS PRN
Status: DISCONTINUED | OUTPATIENT
Start: 2023-05-24 | End: 2023-05-24 | Stop reason: SDUPTHER

## 2023-05-24 RX ORDER — SODIUM CHLORIDE, SODIUM LACTATE, POTASSIUM CHLORIDE, CALCIUM CHLORIDE 600; 310; 30; 20 MG/100ML; MG/100ML; MG/100ML; MG/100ML
INJECTION, SOLUTION INTRAVENOUS CONTINUOUS
Status: DISCONTINUED | OUTPATIENT
Start: 2023-05-24 | End: 2023-05-24 | Stop reason: HOSPADM

## 2023-05-24 RX ADMIN — PROPOFOL 40 MG: 10 INJECTION, EMULSION INTRAVENOUS at 10:44

## 2023-05-24 RX ADMIN — PROPOFOL 40 MG: 10 INJECTION, EMULSION INTRAVENOUS at 10:39

## 2023-05-24 RX ADMIN — PROPOFOL 80 MG: 10 INJECTION, EMULSION INTRAVENOUS at 10:30

## 2023-05-24 RX ADMIN — LIDOCAINE HYDROCHLORIDE 80 MG: 20 INJECTION, SOLUTION INFILTRATION; PERINEURAL at 10:30

## 2023-05-24 RX ADMIN — PROPOFOL 40 MG: 10 INJECTION, EMULSION INTRAVENOUS at 10:31

## 2023-05-24 RX ADMIN — SODIUM CHLORIDE, POTASSIUM CHLORIDE, SODIUM LACTATE AND CALCIUM CHLORIDE: 600; 310; 30; 20 INJECTION, SOLUTION INTRAVENOUS at 10:07

## 2023-05-24 RX ADMIN — PROPOFOL 40 MG: 10 INJECTION, EMULSION INTRAVENOUS at 09:08

## 2023-05-24 RX ADMIN — PROPOFOL 40 MG: 10 INJECTION, EMULSION INTRAVENOUS at 10:35

## 2023-05-24 RX ADMIN — PROPOFOL 40 MG: 10 INJECTION, EMULSION INTRAVENOUS at 10:32

## 2023-05-24 ASSESSMENT — PAIN SCALES - GENERAL
PAINLEVEL_OUTOF10: 0
PAINLEVEL_OUTOF10: 0

## 2023-05-24 ASSESSMENT — PAIN - FUNCTIONAL ASSESSMENT: PAIN_FUNCTIONAL_ASSESSMENT: 0-10

## 2023-05-24 NOTE — H&P
Name:  Noreen Martinez  Age:  75 y.o.   :  1955    Physician: Phil Coretz MD       Chief Complaint: Colon Cancer Screening      HPI:  While undergoing evaluation of a vaginal mass and/or cyst, it was recommended she have a colonoscopy. She may have had colonoscopy in . She has also had a rectal operation before. She does not remember what it was for, but in Dr. Sydnie Walton first Epic note he states it was for a fissure. She denies rectal bleeding, change in her bowel habit etc.        MEDICAL HISTORY:    Past Medical History:        Diagnosis Date    Chronic back pain     Depression     GERD (gastroesophageal reflux disease)     Hyperlipidemia     Hypertension     Morbid obesity ([facility] [address]        Past Surgical History:        Procedure Laterality Date    APPENDECTOMY      COLONOSCOPY      CT ABSCESS DRAIN SUBCUTANEOUS  2021    CT ABSCESS DRAIN SUBCUTANEOUS 2021 [facility] CT SCAN    CYSTOSCOPY      HEMORRHOID SURGERY      HYSTERECTOMY (CERVIX STATUS UNKNOWN)      partial - both ovaries remain; done for uterine fibroid    OTHER SURGICAL HISTORY      fissure repair       Prior to Admission medications    Medication Sig Start Date End Date Taking? Authorizing Provider   polyethylene glycol-electrolytes (NULYTELY WITH FLAVOR PACKS) 420 g solution Mix and take as directed. Beginning at 4:00 pm the day before your procedure, drink an 8 ounce glass every 10-15 minutes until gone. 23   Sriram Tinsley MD   bisacodyl 5 MG EC tablet Take two 5 mg tablets by mouth at 1200 noon the day before your procedure.  23   Sriram Tinsley MD   atorvastatin (LIPITOR) 20 MG tablet Take 1 tablet by mouth daily 23   Quinn Oneill DO   atenolol (TENORMIN) 50 MG tablet Take 1 tablet by mouth daily 23   Quinn Oneill DO   losartan (COZAAR) 25 MG tablet Take 1 tablet by mouth daily 23   Rajan Echevarria DO   levothyroxine (SYNTHROID) 25 MCG tablet Take 0.5 tablets by mouth Daily

## 2023-05-24 NOTE — ANESTHESIA PRE PROCEDURE
Department of Anesthesiology  Preprocedure Note       Name:  Prashanth Leonard   Age:  75 y.o.  :  1955                                          MRN:  [id number]         Date:  2023      Surgeon: Addi Collado):  Marisol Horton MD    Procedure: Procedure(s):  Colonoscopy    Medications prior to admission:   Prior to Admission medications    Medication Sig Start Date End Date Taking? Authorizing Provider   polyethylene glycol-electrolytes (NULYTELY WITH FLAVOR PACKS) 420 g solution Mix and take as directed. Beginning at 4:00 pm the day before your procedure, drink an 8 ounce glass every 10-15 minutes until gone. 23   Marisol Horton MD   bisacodyl 5 MG EC tablet Take two 5 mg tablets by mouth at 1200 noon the day before your procedure. 23   Marisol Horton MD   atorvastatin (LIPITOR) 20 MG tablet Take 1 tablet by mouth daily 23   Vivek Oneill,    atenolol (TENORMIN) 50 MG tablet Take 1 tablet by mouth daily 23   Vivek Oneill DO   losartan (COZAAR) 25 MG tablet Take 1 tablet by mouth daily 23   Henretta Cambric, DO   levothyroxine (SYNTHROID) 25 MCG tablet Take 0.5 tablets by mouth Daily 22   Henretta Cambric, DO   Cholecalciferol (D3-1000 PO) Take by mouth    Historical Provider, MD   furosemide (LASIX) 20 MG tablet TAKE 1 TABLET DAILY 18   Henretta Cambric, DO       Current medications:    Current Facility-Administered Medications   Medication Dose Route Frequency Provider Last Rate Last Admin    lactated ringers IV soln infusion   IntraVENous Continuous Marisol Horton MD           Allergies:     Allergies   Allergen Reactions    Benazepril Other (See Comments)     cough    Lisinopril Other (See Comments)     Cough      Other      Generic Proza \"made me feel weird\"       Problem List:    Patient Active Problem List   Diagnosis Code    Hypertension I10    Hyperlipidemia E78.5    GERD (gastroesophageal reflux disease) K21.9    Morbid obesity ([facility] [address] E66.01   

## 2023-05-24 NOTE — ANESTHESIA POSTPROCEDURE EVALUATION
Department of Anesthesiology  Postprocedure Note    Patient: Maribel Weeks  MRN: [id number]  YOB: 1955  Date of evaluation: 5/24/2023      Procedure Summary     Date: 05/24/23 Room / Location: [address]    Anesthesia Start: 1027 Anesthesia Stop:     Procedure: Colonoscopy Diagnosis:       Screening for colon cancer      (Screening for colon cancer [Z12.11])    Surgeons: Ami Lundborg, MD Responsible Provider: VLADIMIR Mon CRNA    Anesthesia Type: TIVA ASA Status: 3          Anesthesia Type: No value filed.     Antione Phase I: Antione Score: 10    Antione Phase II: Antione Score: 10      Anesthesia Post Evaluation    Patient location during evaluation: bedside  Patient participation: complete - patient participated  Level of consciousness: awake and alert  Pain score: 0  Airway patency: patent  Nausea & Vomiting: no nausea and no vomiting  Complications: no  Cardiovascular status: blood pressure returned to baseline  Respiratory status: acceptable, room air and spontaneous ventilation  Hydration status: euvolemic  Multimodal analgesia pain management approach

## 2023-05-24 NOTE — DISCHARGE INSTRUCTIONS
1) You had a few small polyps which were removed. 2) You will be called with the biopsy results and follow up recommendations. 3) Nothing in your colon that would preclude a gynecologic procedures. 4) Follow up as needed. Discharge Instructions for Colonoscopy     Colonoscopy is a visual exam of the lining of the large intestine, also called the bowel or colon, with a colonoscope. A colonoscope is a flexible tube with a light and a viewing device. It allows the doctor to view the inside of the colon through a tiny video camera. Colonoscopy is performed for many reasons: unexplained anemia , pain, diarrhea , bloody stools, cancer screening, among many other reasons. Complications from a colonoscopy are rare. Some possible serious complications include perforated bowel (which might require surgery) and bleeding (which could require blood transfusion ). Minor complications include bloating, gas, and cramping that can last for 1-2 days after the procedure. Because air is put into your colon during the procedure, it is normal to pass large amounts of air from your rectum. You may not have a bowel movement for 1-3 days after the procedure. What You Will Need   Someone to drive you home after the procedure    Steps to [facility] when you get home. Because the sedative will make you drowsy, don't drive, operate machinery, or make important decisions the day of the procedure. Feelings of bloating, gas, or cramping may persist for 24 hours. Diet    Try sips of water first. If tolerated, resume regular diet or the diet recommended by your physician. Do not drink alcohol for 24 hours. Physical Activity    You may return to work tomorrow. Do not drive, operate heavy machinery, or do activities that require coordination or balance until tomorrow  Otherwise, return to your normal routine as soon as you are comfortable to do so, which is usually the next day after the procedure.

## 2023-05-24 NOTE — OP NOTE
Operative Note      Patient: Sami Matthews  YOB: 1955  MRN: [id number]    Date of Procedure: 5/24/2023    Pre-Op Diagnosis Codes:     * Screening for colon cancer [Z12.11]    Post-Op Diagnosis:  Polyps x 3       Procedure(s):  Colonoscopy + biopsy    Surgeon(s):  Marisol Cortes MD    Assistant:   * No surgical staff found *    Anesthesia: General    Estimated Blood Loss (mL): Minimal    Complications: None    Specimens:   ID Type Source Tests Collected by Time Destination   A : RECTAL POLYP Tissue Rectum SURGICAL PATHOLOGY Marisol Cortes MD 5/24/2023 1036    B : [address] Tissue Cecum SURGICAL PATHOLOGY Marisol Cortes MD 5/24/2023 1041    C : SIGMOID POLYP Tissue Sigmoid Colon SURGICAL PATHOLOGY Marisol Cortes MD 5/24/2023 1046        Implants:  * No implants in log *      Drains: * No LDAs found *    Patient brought to endoscopy area and IV sedation was induced by the CRNA. Scope was put in rectum passed proximally. She has prominent external hemorrhoids. Her prep was good. Retained pasty stool which did require some irrigation get out away scope was passed inwards in the rectum there was a small polyp was removed a couple bites cold biopsy forceps. she has minimal diverticulosis. Scope was passed around the cecum. Ileocecal valve and the appendiceal orifice were visualized. After irrigating the cecum a bit there was a small polyp just within a centimeter of the appendiceal orifice. Polyp was oblong and sessile and removed with about 4 or 5 bites of cold biopsy forceps. From there the scope was slowly Withdrawn. Use narrowband imaging on the way out. A couple more tiny polyps were noted in the sigmoid which were each removed with several bites in the same specimen. Scope the rectum she does have some internal hemorrhoids as well. We will see the biopsy results show make additional recommendation based upon those results.   Electronically signed by Sandra Cervantes MD on

## 2023-05-26 LAB — SURGICAL PATHOLOGY REPORT: NORMAL

## 2023-05-29 ASSESSMENT — ENCOUNTER SYMPTOMS: BACK PAIN: 1

## 2023-05-31 DIAGNOSIS — Z12.11 COLON CANCER SCREENING: Primary | ICD-10-CM

## 2023-06-07 ENCOUNTER — TELEMEDICINE (OUTPATIENT)
Dept: OBGYN | Age: 68
End: 2023-06-07
Payer: MEDICARE

## 2023-06-07 DIAGNOSIS — N89.8 VAGINAL MASS: Primary | ICD-10-CM

## 2023-06-07 DIAGNOSIS — I50.32 CHRONIC DIASTOLIC HEART FAILURE (HCC): ICD-10-CM

## 2023-06-07 DIAGNOSIS — I10 PRIMARY HYPERTENSION: ICD-10-CM

## 2023-06-07 DIAGNOSIS — M85.859 OSTEOPENIA OF HIP, UNSPECIFIED LATERALITY: ICD-10-CM

## 2023-06-07 PROCEDURE — 99211 OFF/OP EST MAY X REQ PHY/QHP: CPT | Performed by: OBSTETRICS & GYNECOLOGY

## 2023-06-07 PROCEDURE — G8428 CUR MEDS NOT DOCUMENT: HCPCS | Performed by: OBSTETRICS & GYNECOLOGY

## 2023-06-07 PROCEDURE — G8399 PT W/DXA RESULTS DOCUMENT: HCPCS | Performed by: OBSTETRICS & GYNECOLOGY

## 2023-06-07 PROCEDURE — 1090F PRES/ABSN URINE INCON ASSESS: CPT | Performed by: OBSTETRICS & GYNECOLOGY

## 2023-06-07 PROCEDURE — 1123F ACP DISCUSS/DSCN MKR DOCD: CPT | Performed by: OBSTETRICS & GYNECOLOGY

## 2023-06-07 PROCEDURE — 99213 OFFICE O/P EST LOW 20 MIN: CPT | Performed by: OBSTETRICS & GYNECOLOGY

## 2023-06-07 PROCEDURE — 3017F COLORECTAL CA SCREEN DOC REV: CPT | Performed by: OBSTETRICS & GYNECOLOGY

## 2023-06-07 NOTE — PROGRESS NOTES
Disease Brother         MI    Cancer Brother         stomach or prostate    Heart Disease Brother         CABG in his late 40's/early 52's    Breast Cancer Maternal Grandmother     Cancer Maternal Grandmother         breast    Diabetes Paternal Grandmother     Cancer Granddaughter     Lupus Niece     Cancer Nephew          Social History     Tobacco Use    Smoking status: Former     Packs/day: 0.00     Years: 40.00     Pack years: 0.00     Types: Cigarettes     Start date: 2014     Quit date: 2021     Years since quittin.9    Smokeless tobacco: Never   Vaping Use    Vaping Use: Never used   Substance Use Topics    Alcohol use: Yes     Comment: very little drinking  just once in a while    Drug use: Yes     Comment: THC BROWNIES         MEDICATIONS:  Current Outpatient Medications   Medication Sig Dispense Refill    polyethylene glycol-electrolytes (NULYTELY WITH FLAVOR PACKS) 420 g solution Mix and take as directed. Beginning at 4:00 pm the day before your procedure, drink an 8 ounce glass every 10-15 minutes until gone. 4000 mL 0    bisacodyl 5 MG EC tablet Take two 5 mg tablets by mouth at 1200 noon the day before your procedure. 2 tablet 0    atorvastatin (LIPITOR) 20 MG tablet Take 1 tablet by mouth daily 90 tablet 0    atenolol (TENORMIN) 50 MG tablet Take 1 tablet by mouth daily 90 tablet 3    losartan (COZAAR) 25 MG tablet Take 1 tablet by mouth daily 90 tablet 3    levothyroxine (SYNTHROID) 25 MCG tablet Take 0.5 tablets by mouth Daily 45 tablet 0    Cholecalciferol (D3-1000 PO) Take by mouth      furosemide (LASIX) 20 MG tablet TAKE 1 TABLET DAILY 90 tablet 3     No current facility-administered medications for this visit. ALLERGIES:  Allergies as of 2023 - Fully Reviewed 2023   Allergen Reaction Noted    Benazepril Other (See Comments) 2016    Lisinopril Other (See Comments) 2016    Other  2013         not currently breastfeeding.     PE is limited due to

## 2023-06-22 PROBLEM — Z12.11 COLON CANCER SCREENING: Status: RESOLVED | Noted: 2023-05-23 | Resolved: 2023-06-22

## 2023-07-25 ENCOUNTER — OFFICE VISIT (OUTPATIENT)
Dept: FAMILY MEDICINE CLINIC | Age: 68
End: 2023-07-25
Payer: MEDICARE

## 2023-07-25 VITALS
HEIGHT: 62 IN | WEIGHT: 293 LBS | RESPIRATION RATE: 17 BRPM | BODY MASS INDEX: 53.92 KG/M2 | SYSTOLIC BLOOD PRESSURE: 120 MMHG | TEMPERATURE: 96.9 F | HEART RATE: 72 BPM | DIASTOLIC BLOOD PRESSURE: 80 MMHG | OXYGEN SATURATION: 96 %

## 2023-07-25 DIAGNOSIS — M47.816 OSTEOARTHRITIS OF LUMBAR SPINE, UNSPECIFIED SPINAL OSTEOARTHRITIS COMPLICATION STATUS: ICD-10-CM

## 2023-07-25 DIAGNOSIS — I10 ESSENTIAL HYPERTENSION: ICD-10-CM

## 2023-07-25 DIAGNOSIS — Z01.818 PRE-OP EXAM: Primary | ICD-10-CM

## 2023-07-25 DIAGNOSIS — E03.9 HYPOTHYROIDISM, UNSPECIFIED TYPE: ICD-10-CM

## 2023-07-25 DIAGNOSIS — N89.8 VAGINAL CYST: ICD-10-CM

## 2023-07-25 PROCEDURE — 3078F DIAST BP <80 MM HG: CPT | Performed by: FAMILY MEDICINE

## 2023-07-25 PROCEDURE — G8427 DOCREV CUR MEDS BY ELIG CLIN: HCPCS | Performed by: FAMILY MEDICINE

## 2023-07-25 PROCEDURE — 1123F ACP DISCUSS/DSCN MKR DOCD: CPT | Performed by: FAMILY MEDICINE

## 2023-07-25 PROCEDURE — G8417 CALC BMI ABV UP PARAM F/U: HCPCS | Performed by: FAMILY MEDICINE

## 2023-07-25 PROCEDURE — G8399 PT W/DXA RESULTS DOCUMENT: HCPCS | Performed by: FAMILY MEDICINE

## 2023-07-25 PROCEDURE — 3017F COLORECTAL CA SCREEN DOC REV: CPT | Performed by: FAMILY MEDICINE

## 2023-07-25 PROCEDURE — 99213 OFFICE O/P EST LOW 20 MIN: CPT | Performed by: FAMILY MEDICINE

## 2023-07-25 PROCEDURE — 99214 OFFICE O/P EST MOD 30 MIN: CPT | Performed by: FAMILY MEDICINE

## 2023-07-25 PROCEDURE — 93005 ELECTROCARDIOGRAM TRACING: CPT | Performed by: FAMILY MEDICINE

## 2023-07-25 PROCEDURE — 93010 ELECTROCARDIOGRAM REPORT: CPT | Performed by: FAMILY MEDICINE

## 2023-07-25 PROCEDURE — 1036F TOBACCO NON-USER: CPT | Performed by: FAMILY MEDICINE

## 2023-07-25 PROCEDURE — 3074F SYST BP LT 130 MM HG: CPT | Performed by: FAMILY MEDICINE

## 2023-07-25 PROCEDURE — 1090F PRES/ABSN URINE INCON ASSESS: CPT | Performed by: FAMILY MEDICINE

## 2023-07-25 ASSESSMENT — ENCOUNTER SYMPTOMS
BLOOD IN STOOL: 0
SHORTNESS OF BREATH: 0

## 2023-08-05 SDOH — ECONOMIC STABILITY: HOUSING INSECURITY
IN THE LAST 12 MONTHS, WAS THERE A TIME WHEN YOU DID NOT HAVE A STEADY PLACE TO SLEEP OR SLEPT IN A SHELTER (INCLUDING NOW)?: NO

## 2023-08-05 SDOH — ECONOMIC STABILITY: FOOD INSECURITY: WITHIN THE PAST 12 MONTHS, THE FOOD YOU BOUGHT JUST DIDN'T LAST AND YOU DIDN'T HAVE MONEY TO GET MORE.: PATIENT DECLINED

## 2023-08-05 SDOH — ECONOMIC STABILITY: TRANSPORTATION INSECURITY
IN THE PAST 12 MONTHS, HAS LACK OF TRANSPORTATION KEPT YOU FROM MEETINGS, WORK, OR FROM GETTING THINGS NEEDED FOR DAILY LIVING?: NO

## 2023-08-05 SDOH — ECONOMIC STABILITY: FOOD INSECURITY: WITHIN THE PAST 12 MONTHS, YOU WORRIED THAT YOUR FOOD WOULD RUN OUT BEFORE YOU GOT MONEY TO BUY MORE.: PATIENT DECLINED

## 2023-08-05 SDOH — ECONOMIC STABILITY: INCOME INSECURITY: HOW HARD IS IT FOR YOU TO PAY FOR THE VERY BASICS LIKE FOOD, HOUSING, MEDICAL CARE, AND HEATING?: PATIENT DECLINED

## 2023-08-05 ASSESSMENT — PATIENT HEALTH QUESTIONNAIRE - PHQ9
SUM OF ALL RESPONSES TO PHQ QUESTIONS 1-9: 0
SUM OF ALL RESPONSES TO PHQ9 QUESTIONS 1 & 2: 0
SUM OF ALL RESPONSES TO PHQ QUESTIONS 1-9: 0
3. TROUBLE FALLING OR STAYING ASLEEP: 0
10. IF YOU CHECKED OFF ANY PROBLEMS, HOW DIFFICULT HAVE THESE PROBLEMS MADE IT FOR YOU TO DO YOUR WORK, TAKE CARE OF THINGS AT HOME, OR GET ALONG WITH OTHER PEOPLE: NOT DIFFICULT AT ALL
2. FEELING DOWN, DEPRESSED OR HOPELESS: NOT AT ALL
SUM OF ALL RESPONSES TO PHQ QUESTIONS 1-9: 0
8. MOVING OR SPEAKING SO SLOWLY THAT OTHER PEOPLE COULD HAVE NOTICED. OR THE OPPOSITE - BEING SO FIDGETY OR RESTLESS THAT YOU HAVE BEEN MOVING AROUND A LOT MORE THAN USUAL: NOT AT ALL
4. FEELING TIRED OR HAVING LITTLE ENERGY: 0
6. FEELING BAD ABOUT YOURSELF - OR THAT YOU ARE A FAILURE OR HAVE LET YOURSELF OR YOUR FAMILY DOWN: 0
2. FEELING DOWN, DEPRESSED OR HOPELESS: 0
1. LITTLE INTEREST OR PLEASURE IN DOING THINGS: NOT AT ALL
6. FEELING BAD ABOUT YOURSELF - OR THAT YOU ARE A FAILURE OR HAVE LET YOURSELF OR YOUR FAMILY DOWN: NOT AT ALL
8. MOVING OR SPEAKING SO SLOWLY THAT OTHER PEOPLE COULD HAVE NOTICED. OR THE OPPOSITE, BEING SO FIGETY OR RESTLESS THAT YOU HAVE BEEN MOVING AROUND A LOT MORE THAN USUAL: 0
9. THOUGHTS THAT YOU WOULD BE BETTER OFF DEAD, OR OF HURTING YOURSELF: 0
7. TROUBLE CONCENTRATING ON THINGS, SUCH AS READING THE NEWSPAPER OR WATCHING TELEVISION: 0
10. IF YOU CHECKED OFF ANY PROBLEMS, HOW DIFFICULT HAVE THESE PROBLEMS MADE IT FOR YOU TO DO YOUR WORK, TAKE CARE OF THINGS AT HOME, OR GET ALONG WITH OTHER PEOPLE: 0
7. TROUBLE CONCENTRATING ON THINGS, SUCH AS READING THE NEWSPAPER OR WATCHING TELEVISION: NOT AT ALL
9. THOUGHTS THAT YOU WOULD BE BETTER OFF DEAD, OR OF HURTING YOURSELF: NOT AT ALL
5. POOR APPETITE OR OVEREATING: NOT AT ALL
SUM OF ALL RESPONSES TO PHQ QUESTIONS 1-9: 0
4. FEELING TIRED OR HAVING LITTLE ENERGY: NOT AT ALL
3. TROUBLE FALLING OR STAYING ASLEEP: NOT AT ALL
1. LITTLE INTEREST OR PLEASURE IN DOING THINGS: 0
SUM OF ALL RESPONSES TO PHQ QUESTIONS 1-9: 0
5. POOR APPETITE OR OVEREATING: 0

## 2023-08-08 ENCOUNTER — OFFICE VISIT (OUTPATIENT)
Dept: OBGYN CLINIC | Age: 68
End: 2023-08-08
Payer: MEDICARE

## 2023-08-08 ENCOUNTER — HOSPITAL ENCOUNTER (OUTPATIENT)
Dept: GENERAL RADIOLOGY | Age: 68
Discharge: HOME | End: 2023-08-10
Attending: OBSTETRICS & GYNECOLOGY
Payer: MEDICARE

## 2023-08-08 ENCOUNTER — PREP FOR PROCEDURE (OUTPATIENT)
Dept: OBGYN CLINIC | Age: 68
End: 2023-08-08

## 2023-08-08 ENCOUNTER — HOSPITAL ENCOUNTER (OUTPATIENT)
Dept: PREADMISSION TESTING | Age: 68
Discharge: HOME | End: 2023-08-12
Attending: OBSTETRICS & GYNECOLOGY | Admitting: OBSTETRICS & GYNECOLOGY
Payer: MEDICARE

## 2023-08-08 VITALS
SYSTOLIC BLOOD PRESSURE: 138 MMHG | DIASTOLIC BLOOD PRESSURE: 76 MMHG | HEIGHT: 63 IN | WEIGHT: 293 LBS | BODY MASS INDEX: 51.91 KG/M2

## 2023-08-08 VITALS
BODY MASS INDEX: 51.91 KG/M2 | HEART RATE: 69 BPM | HEIGHT: 63 IN | WEIGHT: 293 LBS | SYSTOLIC BLOOD PRESSURE: 134 MMHG | OXYGEN SATURATION: 100 % | DIASTOLIC BLOOD PRESSURE: 80 MMHG | RESPIRATION RATE: 20 BRPM | TEMPERATURE: 97.7 F

## 2023-08-08 DIAGNOSIS — Z01.818 PREOP TESTING: ICD-10-CM

## 2023-08-08 DIAGNOSIS — M85.859 OSTEOPENIA OF HIP, UNSPECIFIED LATERALITY: ICD-10-CM

## 2023-08-08 DIAGNOSIS — N89.8 VAGINAL MASS: Primary | ICD-10-CM

## 2023-08-08 DIAGNOSIS — Z01.818 PREOP TESTING: Primary | ICD-10-CM

## 2023-08-08 DIAGNOSIS — I10 PRIMARY HYPERTENSION: ICD-10-CM

## 2023-08-08 DIAGNOSIS — I50.32 CHRONIC DIASTOLIC HEART FAILURE (HCC): ICD-10-CM

## 2023-08-08 LAB
ABO + RH BLD: NORMAL
ANION GAP SERPL CALCULATED.3IONS-SCNC: 9 MMOL/L (ref 9–17)
ARM BAND NUMBER: NORMAL
BACTERIA URNS QL MICRO: ABNORMAL
BASOPHILS # BLD: 0 K/UL (ref 0–0.2)
BASOPHILS NFR BLD: 0 % (ref 0–2)
BILIRUB UR QL STRIP: NEGATIVE
BLOOD BANK SAMPLE EXPIRATION: NORMAL
BLOOD GROUP ANTIBODIES SERPL: NEGATIVE
BUN SERPL-MCNC: 16 MG/DL (ref 8–23)
CALCIUM SERPL-MCNC: 9.7 MG/DL (ref 8.6–10.4)
CASTS #/AREA URNS LPF: ABNORMAL /LPF
CHLORIDE SERPL-SCNC: 101 MMOL/L (ref 98–107)
CLARITY UR: ABNORMAL
CO2 SERPL-SCNC: 27 MMOL/L (ref 20–31)
COLOR UR: ABNORMAL
CREAT SERPL-MCNC: 0.7 MG/DL (ref 0.5–0.9)
EOSINOPHIL # BLD: 0.1 K/UL (ref 0–0.4)
EOSINOPHILS RELATIVE PERCENT: 2 % (ref 0–4)
EPI CELLS #/AREA URNS HPF: ABNORMAL /HPF
ERYTHROCYTE [DISTWIDTH] IN BLOOD BY AUTOMATED COUNT: 14.1 % (ref 11.5–14.9)
GFR SERPL CREATININE-BSD FRML MDRD: >60 ML/MIN/1.73M2
GLUCOSE SERPL-MCNC: 97 MG/DL (ref 70–99)
GLUCOSE UR STRIP-MCNC: NEGATIVE MG/DL
HCT VFR BLD AUTO: 44.9 % (ref 36–46)
HGB BLD-MCNC: 14.8 G/DL (ref 12–16)
HGB UR QL STRIP.AUTO: NEGATIVE
KETONES UR STRIP-MCNC: NEGATIVE MG/DL
LEUKOCYTE ESTERASE UR QL STRIP: NEGATIVE
LYMPHOCYTES NFR BLD: 2 K/UL (ref 1–4.8)
LYMPHOCYTES RELATIVE PERCENT: 27 % (ref 24–44)
MCH RBC QN AUTO: 29.5 PG (ref 26–34)
MCHC RBC AUTO-ENTMCNC: 33.1 G/DL (ref 31–37)
MCV RBC AUTO: 89.1 FL (ref 80–100)
MONOCYTES NFR BLD: 0.7 K/UL (ref 0.1–1.3)
MONOCYTES NFR BLD: 9 % (ref 1–7)
NEUTROPHILS NFR BLD: 62 % (ref 36–66)
NEUTS SEG NFR BLD: 4.5 K/UL (ref 1.3–9.1)
NITRITE UR QL STRIP: NEGATIVE
PH UR STRIP: 5 [PH] (ref 5–8)
PLATELET # BLD AUTO: 272 K/UL (ref 150–450)
PMV BLD AUTO: 10.2 FL (ref 6–12)
POTASSIUM SERPL-SCNC: 4.6 MMOL/L (ref 3.7–5.3)
PROT UR STRIP-MCNC: ABNORMAL MG/DL
RBC # BLD AUTO: 5.04 M/UL (ref 4–5.2)
RBC #/AREA URNS HPF: ABNORMAL /HPF
SODIUM SERPL-SCNC: 137 MMOL/L (ref 135–144)
SP GR UR STRIP: 1.02 (ref 1–1.03)
UROBILINOGEN UR STRIP-ACNC: NORMAL EU/DL (ref 0–1)
WBC #/AREA URNS HPF: ABNORMAL /HPF
WBC OTHER # BLD: 7.3 K/UL (ref 3.5–11)

## 2023-08-08 PROCEDURE — 80048 BASIC METABOLIC PNL TOTAL CA: CPT

## 2023-08-08 PROCEDURE — 87086 URINE CULTURE/COLONY COUNT: CPT

## 2023-08-08 PROCEDURE — 3075F SYST BP GE 130 - 139MM HG: CPT | Performed by: OBSTETRICS & GYNECOLOGY

## 2023-08-08 PROCEDURE — 3017F COLORECTAL CA SCREEN DOC REV: CPT | Performed by: OBSTETRICS & GYNECOLOGY

## 2023-08-08 PROCEDURE — 86850 RBC ANTIBODY SCREEN: CPT

## 2023-08-08 PROCEDURE — G8399 PT W/DXA RESULTS DOCUMENT: HCPCS | Performed by: OBSTETRICS & GYNECOLOGY

## 2023-08-08 PROCEDURE — 36415 COLL VENOUS BLD VENIPUNCTURE: CPT

## 2023-08-08 PROCEDURE — 81001 URINALYSIS AUTO W/SCOPE: CPT

## 2023-08-08 PROCEDURE — 99213 OFFICE O/P EST LOW 20 MIN: CPT | Performed by: OBSTETRICS & GYNECOLOGY

## 2023-08-08 PROCEDURE — 1036F TOBACCO NON-USER: CPT | Performed by: OBSTETRICS & GYNECOLOGY

## 2023-08-08 PROCEDURE — 3078F DIAST BP <80 MM HG: CPT | Performed by: OBSTETRICS & GYNECOLOGY

## 2023-08-08 PROCEDURE — 1090F PRES/ABSN URINE INCON ASSESS: CPT | Performed by: OBSTETRICS & GYNECOLOGY

## 2023-08-08 PROCEDURE — 86900 BLOOD TYPING SEROLOGIC ABO: CPT

## 2023-08-08 PROCEDURE — 85025 COMPLETE CBC W/AUTO DIFF WBC: CPT

## 2023-08-08 PROCEDURE — 71046 X-RAY EXAM CHEST 2 VIEWS: CPT

## 2023-08-08 PROCEDURE — G8427 DOCREV CUR MEDS BY ELIG CLIN: HCPCS | Performed by: OBSTETRICS & GYNECOLOGY

## 2023-08-08 PROCEDURE — 1123F ACP DISCUSS/DSCN MKR DOCD: CPT | Performed by: OBSTETRICS & GYNECOLOGY

## 2023-08-08 PROCEDURE — 86901 BLOOD TYPING SEROLOGIC RH(D): CPT

## 2023-08-08 PROCEDURE — G8417 CALC BMI ABV UP PARAM F/U: HCPCS | Performed by: OBSTETRICS & GYNECOLOGY

## 2023-08-08 RX ORDER — SODIUM CHLORIDE 0.9 % (FLUSH) 0.9 %
5-40 SYRINGE (ML) INJECTION PRN
Status: CANCELLED | OUTPATIENT
Start: 2023-08-08

## 2023-08-08 RX ORDER — SODIUM CHLORIDE, SODIUM LACTATE, POTASSIUM CHLORIDE, CALCIUM CHLORIDE 600; 310; 30; 20 MG/100ML; MG/100ML; MG/100ML; MG/100ML
INJECTION, SOLUTION INTRAVENOUS CONTINUOUS
Status: CANCELLED | OUTPATIENT
Start: 2023-08-08

## 2023-08-08 RX ORDER — LORAZEPAM 1 MG/1
TABLET ORAL PRN
COMMUNITY

## 2023-08-08 RX ORDER — SODIUM CHLORIDE 9 MG/ML
INJECTION, SOLUTION INTRAVENOUS PRN
Status: CANCELLED | OUTPATIENT
Start: 2023-08-08

## 2023-08-08 RX ORDER — BUPROPION HYDROCHLORIDE 300 MG/1
TABLET ORAL PRN
COMMUNITY
Start: 2021-11-03

## 2023-08-08 RX ORDER — SODIUM CHLORIDE 0.9 % (FLUSH) 0.9 %
5-40 SYRINGE (ML) INJECTION EVERY 12 HOURS SCHEDULED
Status: CANCELLED | OUTPATIENT
Start: 2023-08-08

## 2023-08-08 NOTE — DISCHARGE INSTRUCTIONS
practitioner or house officer. Your IV will be started and you will meet your anesthesiologist.    When you go to surgery, your family will be directed to the surgical waiting room, where the doctor should speak with them after your surgery. After surgery, you will be taken to the recovery room and or short stay unit for recovery and preparation for discharge. If you use a Bi-PAP or C-PAP machine, please bring it with you and leave it in the car in case it is needed in recovery room.

## 2023-08-08 NOTE — PROGRESS NOTES
AXIAL SKELETON     Result Date: 5/16/2023  EXAMINATION: BONE DENSITOMETRY 5/16/2023 8:56 am TECHNIQUE: A bone density dual x-ray absorptiometry (DXA) scan was performed of the lumbar spine and left hip on a [employer] system. COMPARISON: None. HISTORY: ORDERING SYSTEM PROVIDED HISTORY: Screening for osteoporosis TECHNOLOGIST PROVIDED HISTORY: screening osteoporosis Reason for Exam: Initial screening for osteoporosis Gender: F Age: 76 y/o FINDINGS: LUMBAR SPINE: L1-L2 BMD: 1.141 g/cm2 T-score: -0.2 Z-score: 0.3 LEFT TOTAL HIP: BMD: 0.795 g/cm2 T-score: -1.7 Z-score: -1.1 LEFT FEMORAL NECK: BMD: 0.799 g/cm2 T-score: -1.7 Z-score: -0.8 FRAX: 10-year fracture risk is performed using the [employer] FRAX calculator based on patient-reported risk factors. Major osteoporotic fracture: 8.2% Hip fracture: 1.1% Other situations known to alter the reliability of the FRAX score should be considered when making treatment decisions, including chronic glucocorticoid use and past treatments. Further guidance on treatment can be found at the [facility] Osteoporosis Foundation's website bonesource.org.      Osteopenia by WHO criteria. RECOMMENDATIONS: 1. All patients should optimize their calcium and vitamin D intake.  2. Consider FDA-approved medical therapies in postmenopausal women and men aged 48 years and older, based on the following: - A hip or vertebral (clinical or morphometric) fracture - T-score less than or equal to -2.5 at the femoral neck or spine after appropriate evaluation to exclude secondary causes - Low bone density (T-score between -1.0 and -2.5 at the femoral neck or spine) and a 10-year probability of a hip fracture greater than or equal to 3% or a 10-year probability of a major osteoporosis-related fracture greater than or equal to 20% based on FRAX calculation. - Clinician judgment and/or patient preferences may indicate treatment for people with 10-year fracture probabilities above or below

## 2023-08-08 NOTE — H&P
[address] [facility]  [address]; [address]  [place], [address]  [phone] [place] [phone] Fax        Mohinder Dennis  1955  Primary Care Physician: Bill Wise, DO        HISTORY  Medical Drive: [facility]      2023  MEDICAID CONSENT COMPLETED: No      HPI: Mohinder Dennis is a 70 y.o. female   she is being seen for the problems listed below and is planning surgical intervention. She was counseled on all conservative medical and surgical options as well as definitive procedures as well. 1. Vaginal mass    2. Chronic diastolic heart failure ([address])    3. Primary hypertension    4. Osteopenia of hip, unspecified laterality          Relevant Past History:   Patient Active Problem List    Diagnosis Date Noted    Osteopenia  2023     Priority: High     Osteopenia by WHO criteria. RECOMMENDATIONS: 1. All patients should optimize their calcium and vitamin D intake. 2. Consider FDA-approved medical therapies in postmenopausal women and men aged 48 years and older, based on the following: - A hip or vertebral (clinical or morphometric) fracture - T-score less than or equal to -2.5 at the femoral neck or spine after appropriate evaluation to exclude secondary causes - Low bone density (T-score between -1.0 and -2.5 at the femoral neck or spine) and a 10-year probability of a hip fracture greater than or equal to 3% or a 10-year probability of a major osteoporosis-related fracture greater than or equal to 20% based on FRAX calculation. - Clinician judgment and/or patient preferences may indicate treatment for people with 10-year fracture probabilities above or below these levels - Further guidance on treatment can be found at the National Osteoporosis Foundation's website 777-824-4694. 3. Patients with diagnosis of osteoporosis or at high risk for fracture should have regular bone mineral density tests.  For patients eligible for Medicare, routine testing is

## 2023-08-09 LAB
MICROORGANISM SPEC CULT: NORMAL
SPECIMEN DESCRIPTION: NORMAL

## 2023-08-18 ENCOUNTER — ANESTHESIA EVENT (OUTPATIENT)
Dept: OPERATING ROOM | Age: 68
End: 2023-08-18
Payer: MEDICARE

## 2023-08-18 NOTE — PRE-PROCEDURE INSTRUCTIONS
No answer, left message ? Unable to leave message ? When were you told to arrive at hospital ?  [address]    Do you have a  ?yes    Are you on any blood thinners ? no                 If yes when did you stop taking ? Do you have your prep Rx filled and instruction ? N/a    Nothing to eat the day before , only clear liquids. n/a    Are you experiencing any covid symptoms ? no    Do you have any infections or rash we should be aware of ?no      Do you have the Hibiclens soap to use the night before and the morning of surgery ?n/a    Nothing to eat or drink after midnight, only a sip of water to take any medication instructed to take the night before. yes  Wear comfortable clothing, leave any valuables at home, remove any jewelry and body piercing .  yes

## 2023-08-20 NOTE — DISCHARGE INSTRUCTIONS
Patient Name: Patrick Paget  Date: 8/20/2023  Time: 1:45 PM  MRN #: [id number]  [facility] #: [id number]      [address] Gyne[facility]  Outpatient Surgery After Care Instructions    For Problems after Leaving the Hospital  Call [phone]  IN AN EMERGENCY CALL 911 OR GO TO THE NEAREST EMERGENCY ROOM    For The next 24 hours:  Do not: drive, work, or operate machinery  Do not: consume alcohol, tranquilizers, or sleeping medications  Do not: make important personal or business decisions    Normal post operative expectations:  A low grade fever      Orange  coloration  of skin  from  soap    Sore  scratchy  throat    Minimal  amount  of swelling  or  bruising  Drainage  from operative  site    Other:                                                      Specific problems or warning signs to watch for:    Call [phone] if any of the following occur:  Persistent bleeding not stopped by direct pressure  Coughing or vomiting a large amount of bright red blood  Unable to tolerate liquids for more than 4 hours  Severe unexplained pain or burning  Temperature greater than 101 F  Unable to urinate for more than 6 hours  Other:     Medications:  A copy of your medication reconciliation form has been given to you  You have been given drug information sheets. Side effects, warnings and how to use these medications can be found on these sheets. Resume your usual medications        medications reconciled   Take prescriptions as directed              prescriptions reviewed   Tylenol 325 mg two tab every 6 hours with food (Over the counter)   Take Pain medication with food        [address] Gynecology  Activity:  Restrict your activities for the rest of the day. Resume light activity tomorrow as you feel you can unless otherwise instructed. No driving for 48 hours. You are advised to go directly home from the hospital.  Do not engage in any strenuous activity that may put stress on your incision.   No

## 2023-08-21 ENCOUNTER — ANESTHESIA (OUTPATIENT)
Dept: OPERATING ROOM | Age: 68
End: 2023-08-21
Payer: MEDICARE

## 2023-08-21 ENCOUNTER — HOSPITAL ENCOUNTER (OUTPATIENT)
Age: 68
Setting detail: OUTPATIENT SURGERY
Discharge: HOME | End: 2023-08-21
Attending: OBSTETRICS & GYNECOLOGY | Admitting: OBSTETRICS & GYNECOLOGY
Payer: MEDICARE

## 2023-08-21 VITALS
SYSTOLIC BLOOD PRESSURE: 105 MMHG | WEIGHT: 293 LBS | TEMPERATURE: 97.9 F | DIASTOLIC BLOOD PRESSURE: 64 MMHG | OXYGEN SATURATION: 94 % | HEART RATE: 73 BPM | BODY MASS INDEX: 51.91 KG/M2 | HEIGHT: 63 IN | RESPIRATION RATE: 16 BRPM

## 2023-08-21 DIAGNOSIS — N89.8 VAGINAL MASS: ICD-10-CM

## 2023-08-21 PROCEDURE — C9399 UNCLASSIFIED DRUGS OR BIOLOG: HCPCS | Performed by: NURSE ANESTHETIST, CERTIFIED REGISTERED

## 2023-08-21 PROCEDURE — 7100000030 HC ASPR PHASE II RECOVERY - FIRST 15 MIN: Performed by: OBSTETRICS & GYNECOLOGY

## 2023-08-21 PROCEDURE — 7100000010 HC PHASE II RECOVERY - FIRST 15 MIN: Performed by: OBSTETRICS & GYNECOLOGY

## 2023-08-21 PROCEDURE — 3600000002 HC SURGERY LEVEL 2 BASE: Performed by: OBSTETRICS & GYNECOLOGY

## 2023-08-21 PROCEDURE — 2709999900 HC NON-CHARGEABLE SUPPLY: Performed by: OBSTETRICS & GYNECOLOGY

## 2023-08-21 PROCEDURE — 6360000002 HC RX W HCPCS: Performed by: ANESTHESIOLOGY

## 2023-08-21 PROCEDURE — 7100000001 HC PACU RECOVERY - ADDTL 15 MIN: Performed by: OBSTETRICS & GYNECOLOGY

## 2023-08-21 PROCEDURE — 3600000012 HC SURGERY LEVEL 2 ADDTL 15MIN: Performed by: OBSTETRICS & GYNECOLOGY

## 2023-08-21 PROCEDURE — 7100000011 HC PHASE II RECOVERY - ADDTL 15 MIN: Performed by: OBSTETRICS & GYNECOLOGY

## 2023-08-21 PROCEDURE — 6370000000 HC RX 637 (ALT 250 FOR IP): Performed by: ANESTHESIOLOGY

## 2023-08-21 PROCEDURE — 6360000002 HC RX W HCPCS: Performed by: NURSE ANESTHETIST, CERTIFIED REGISTERED

## 2023-08-21 PROCEDURE — 2500000003 HC RX 250 WO HCPCS: Performed by: NURSE ANESTHETIST, CERTIFIED REGISTERED

## 2023-08-21 PROCEDURE — 3700000000 HC ANESTHESIA ATTENDED CARE: Performed by: OBSTETRICS & GYNECOLOGY

## 2023-08-21 PROCEDURE — 7100000031 HC ASPR PHASE II RECOVERY - ADDTL 15 MIN: Performed by: OBSTETRICS & GYNECOLOGY

## 2023-08-21 PROCEDURE — 2580000003 HC RX 258: Performed by: OBSTETRICS & GYNECOLOGY

## 2023-08-21 PROCEDURE — 7100000000 HC PACU RECOVERY - FIRST 15 MIN: Performed by: OBSTETRICS & GYNECOLOGY

## 2023-08-21 PROCEDURE — 3700000001 HC ADD 15 MINUTES (ANESTHESIA): Performed by: OBSTETRICS & GYNECOLOGY

## 2023-08-21 PROCEDURE — 88305 TISSUE EXAM BY PATHOLOGIST: CPT

## 2023-08-21 PROCEDURE — 6360000002 HC RX W HCPCS: Performed by: OBSTETRICS & GYNECOLOGY

## 2023-08-21 RX ORDER — SODIUM CHLORIDE 0.9 % (FLUSH) 0.9 %
5-40 SYRINGE (ML) INJECTION EVERY 12 HOURS SCHEDULED
Status: DISCONTINUED | OUTPATIENT
Start: 2023-08-21 | End: 2023-08-21 | Stop reason: HOSPADM

## 2023-08-21 RX ORDER — DIPHENHYDRAMINE HYDROCHLORIDE 50 MG/ML
12.5 INJECTION INTRAMUSCULAR; INTRAVENOUS
Status: DISCONTINUED | OUTPATIENT
Start: 2023-08-21 | End: 2023-08-21 | Stop reason: HOSPADM

## 2023-08-21 RX ORDER — HYDRALAZINE HYDROCHLORIDE 20 MG/ML
10 INJECTION INTRAMUSCULAR; INTRAVENOUS
Status: DISCONTINUED | OUTPATIENT
Start: 2023-08-21 | End: 2023-08-21 | Stop reason: HOSPADM

## 2023-08-21 RX ORDER — ROCURONIUM BROMIDE 10 MG/ML
INJECTION, SOLUTION INTRAVENOUS PRN
Status: DISCONTINUED | OUTPATIENT
Start: 2023-08-21 | End: 2023-08-21 | Stop reason: SDUPTHER

## 2023-08-21 RX ORDER — MEPERIDINE HYDROCHLORIDE 25 MG/ML
12.5 INJECTION INTRAMUSCULAR; INTRAVENOUS; SUBCUTANEOUS EVERY 5 MIN PRN
Status: DISCONTINUED | OUTPATIENT
Start: 2023-08-21 | End: 2023-08-21 | Stop reason: HOSPADM

## 2023-08-21 RX ORDER — SODIUM CHLORIDE, SODIUM LACTATE, POTASSIUM CHLORIDE, CALCIUM CHLORIDE 600; 310; 30; 20 MG/100ML; MG/100ML; MG/100ML; MG/100ML
INJECTION, SOLUTION INTRAVENOUS CONTINUOUS
Status: DISCONTINUED | OUTPATIENT
Start: 2023-08-21 | End: 2023-08-21 | Stop reason: HOSPADM

## 2023-08-21 RX ORDER — ONDANSETRON 2 MG/ML
4 INJECTION INTRAMUSCULAR; INTRAVENOUS
Status: COMPLETED | OUTPATIENT
Start: 2023-08-21 | End: 2023-08-21

## 2023-08-21 RX ORDER — LIDOCAINE HYDROCHLORIDE 10 MG/ML
1 INJECTION, SOLUTION EPIDURAL; INFILTRATION; INTRACAUDAL; PERINEURAL
Status: DISCONTINUED | OUTPATIENT
Start: 2023-08-21 | End: 2023-08-21 | Stop reason: HOSPADM

## 2023-08-21 RX ORDER — ACETAMINOPHEN 500 MG
1000 TABLET ORAL EVERY 6 HOURS PRN
Qty: 60 TABLET | Refills: 1 | Status: SHIPPED | OUTPATIENT
Start: 2023-08-21 | End: 2023-08-21 | Stop reason: HOSPADM

## 2023-08-21 RX ORDER — DEXAMETHASONE SODIUM PHOSPHATE 4 MG/ML
INJECTION, SOLUTION INTRA-ARTICULAR; INTRALESIONAL; INTRAMUSCULAR; INTRAVENOUS; SOFT TISSUE PRN
Status: DISCONTINUED | OUTPATIENT
Start: 2023-08-21 | End: 2023-08-21 | Stop reason: SDUPTHER

## 2023-08-21 RX ORDER — ACETAMINOPHEN 325 MG/1
650 TABLET ORAL
Status: DISCONTINUED | OUTPATIENT
Start: 2023-08-21 | End: 2023-08-21 | Stop reason: HOSPADM

## 2023-08-21 RX ORDER — GABAPENTIN 100 MG/1
100 CAPSULE ORAL ONCE
Status: COMPLETED | OUTPATIENT
Start: 2023-08-21 | End: 2023-08-21

## 2023-08-21 RX ORDER — FENTANYL CITRATE 50 UG/ML
INJECTION, SOLUTION INTRAMUSCULAR; INTRAVENOUS PRN
Status: DISCONTINUED | OUTPATIENT
Start: 2023-08-21 | End: 2023-08-21 | Stop reason: SDUPTHER

## 2023-08-21 RX ORDER — SODIUM CHLORIDE 9 MG/ML
INJECTION, SOLUTION INTRAVENOUS PRN
Status: DISCONTINUED | OUTPATIENT
Start: 2023-08-21 | End: 2023-08-21 | Stop reason: HOSPADM

## 2023-08-21 RX ORDER — SODIUM CHLORIDE 0.9 % (FLUSH) 0.9 %
5-40 SYRINGE (ML) INJECTION PRN
Status: DISCONTINUED | OUTPATIENT
Start: 2023-08-21 | End: 2023-08-21 | Stop reason: HOSPADM

## 2023-08-21 RX ORDER — LABETALOL HYDROCHLORIDE 5 MG/ML
10 INJECTION, SOLUTION INTRAVENOUS
Status: DISCONTINUED | OUTPATIENT
Start: 2023-08-21 | End: 2023-08-21 | Stop reason: HOSPADM

## 2023-08-21 RX ORDER — METOCLOPRAMIDE HYDROCHLORIDE 5 MG/ML
10 INJECTION INTRAMUSCULAR; INTRAVENOUS
Status: COMPLETED | OUTPATIENT
Start: 2023-08-21 | End: 2023-08-21

## 2023-08-21 RX ORDER — ACETAMINOPHEN 500 MG
1000 TABLET ORAL ONCE
Status: COMPLETED | OUTPATIENT
Start: 2023-08-21 | End: 2023-08-21

## 2023-08-21 RX ORDER — LIDOCAINE HYDROCHLORIDE 20 MG/ML
INJECTION, SOLUTION EPIDURAL; INFILTRATION; INTRACAUDAL; PERINEURAL PRN
Status: DISCONTINUED | OUTPATIENT
Start: 2023-08-21 | End: 2023-08-21 | Stop reason: SDUPTHER

## 2023-08-21 RX ORDER — PROPOFOL 10 MG/ML
INJECTION, EMULSION INTRAVENOUS PRN
Status: DISCONTINUED | OUTPATIENT
Start: 2023-08-21 | End: 2023-08-21 | Stop reason: SDUPTHER

## 2023-08-21 RX ORDER — FENTANYL CITRATE 0.05 MG/ML
25 INJECTION, SOLUTION INTRAMUSCULAR; INTRAVENOUS EVERY 5 MIN PRN
Status: DISCONTINUED | OUTPATIENT
Start: 2023-08-21 | End: 2023-08-21 | Stop reason: HOSPADM

## 2023-08-21 RX ORDER — IBUPROFEN 600 MG/1
600 TABLET ORAL EVERY 6 HOURS PRN
Qty: 30 TABLET | Refills: 1 | Status: SHIPPED | OUTPATIENT
Start: 2023-08-21 | End: 2023-08-21 | Stop reason: HOSPADM

## 2023-08-21 RX ADMIN — ONDANSETRON 4 MG: 2 INJECTION INTRAMUSCULAR; INTRAVENOUS at 11:15

## 2023-08-21 RX ADMIN — LIDOCAINE HYDROCHLORIDE 100 MG: 20 INJECTION, SOLUTION EPIDURAL; INFILTRATION; INTRACAUDAL; PERINEURAL at 10:13

## 2023-08-21 RX ADMIN — SUGAMMADEX 200 MG: 100 INJECTION, SOLUTION INTRAVENOUS at 10:47

## 2023-08-21 RX ADMIN — PROPOFOL 200 MG: 10 INJECTION, EMULSION INTRAVENOUS at 10:13

## 2023-08-21 RX ADMIN — FENTANYL CITRATE 100 MCG: 50 INJECTION, SOLUTION INTRAMUSCULAR; INTRAVENOUS at 10:24

## 2023-08-21 RX ADMIN — DEXAMETHASONE SODIUM PHOSPHATE 8 MG: 4 INJECTION INTRA-ARTICULAR; INTRALESIONAL; INTRAMUSCULAR; INTRAVENOUS; SOFT TISSUE at 10:17

## 2023-08-21 RX ADMIN — Medication 3000 MG: at 10:46

## 2023-08-21 RX ADMIN — SODIUM CHLORIDE, POTASSIUM CHLORIDE, SODIUM LACTATE AND CALCIUM CHLORIDE: 600; 310; 30; 20 INJECTION, SOLUTION INTRAVENOUS at 08:16

## 2023-08-21 RX ADMIN — ROCURONIUM BROMIDE 50 MG: 10 INJECTION, SOLUTION INTRAVENOUS at 10:13

## 2023-08-21 RX ADMIN — GABAPENTIN 100 MG: 100 CAPSULE ORAL at 08:19

## 2023-08-21 RX ADMIN — ACETAMINOPHEN 1000 MG: 500 TABLET ORAL at 08:19

## 2023-08-21 RX ADMIN — METOCLOPRAMIDE 10 MG: 5 INJECTION, SOLUTION INTRAMUSCULAR; INTRAVENOUS at 11:30

## 2023-08-21 ASSESSMENT — PAIN SCALES - GENERAL: PAINLEVEL_OUTOF10: 0

## 2023-08-21 ASSESSMENT — LIFESTYLE VARIABLES: SMOKING_STATUS: 0

## 2023-08-21 ASSESSMENT — ENCOUNTER SYMPTOMS
STRIDOR: 0
SHORTNESS OF BREATH: 0

## 2023-08-21 ASSESSMENT — PAIN - FUNCTIONAL ASSESSMENT: PAIN_FUNCTIONAL_ASSESSMENT: 0-10

## 2023-08-21 NOTE — ANESTHESIA PRE PROCEDURE
Neuro/Psych:   (+) neuromuscular disease:, psychiatric history:   (-) seizures, TIA, CVA, headaches and depression/anxiety            GI/Hepatic/Renal:   (+) GERD: no interval change,      (-) hiatal hernia, PUD, hepatitis, liver disease, no renal disease, bowel prep and no morbid obesity       Endo/Other: Negative Endo/Other ROS   (+) no malignancy/cancer. (-) diabetes mellitus, hypothyroidism, hyperthyroidism, blood dyscrasia, arthritis, no electrolyte abnormalities, no malignancy/cancer               Abdominal:             Vascular: negative vascular ROS. - PVD, DVT and PE. Other Findings: Lower partial          Anesthesia Plan      general     ASA 2       Induction: intravenous. MIPS: Postoperative opioids intended and Prophylactic antiemetics administered. Anesthetic plan and risks discussed with patient.                         Nelson Bailey MD   8/21/2023

## 2023-08-21 NOTE — OP NOTE
Operative Note      Patient: Louise Smalltingham  YOB: 1955  MRN: [id number]    Date of Procedure: 8/21/2023    Pre-Op Diagnosis Codes: * Vaginal mass [N89.8]    Post-Op Diagnosis:  Same pathology is pending       Procedure(s):  RESECTION OF Left vaginal lesion and right vaginal cystectomy    Surgeon(s):  Paola Doe, DO      Assistant:   Resident: Vannesa Frazier MD PGY 3  Resident: Quyen Pak DO PGY 1    Anesthesia: General    Estimated Blood Loss (mL): Minimal (less than 5 ml)  IVF: 600 ml  Urine Output: 571 ml    Complications: None    Specimens:   ID Type Source Tests Collected by Time Destination   A : Left vaginal fornice Tissue Vaginal SURGICAL PATHOLOGY Paola Pump, DO 8/21/2023 1023    B : Partial Right Vaginal cyst wall  Tissue Vaginal SURGICAL PATHOLOGY Paola Pump, DO 8/21/2023 1045        Implants:  * No implants in log *      Drains: * No LDAs found *    Findings: Absent uterus without adnexal fullness Bilaterally. Smooth bladder  without mass effect. Brown colored ? Nevus left lateral fornix ~2-4 mm. Right suspected Inclusion cyst right fornix ~5-7 mm. No inguinal adenopathy. Detailed Description of Procedure:   Description of Procedure: (Understanding of limitations from template op-reports exist)  The patient was seen in the pre-operative area. The procedure risk and complications were reviewed. The consent , labs , and H&P were reviewed. The patient had all of her questions answered. The patient was moved to the operative suite where she was placed under general anesthesia by the anesthesiologist.  Time out was preformed. The patient was placed in the dorsal lithotomy position using  Yellow fin stirrups  and prepped and draped in the normal sterile fashion. EPC's were in place and operating correctly. Her bladder was drained with a Lock catheter. A bimanual exam was completed. A weighted speculum was placed along the posterior vaginal wall.

## 2023-08-21 NOTE — H&P
HISTORY & PHYSICAL PRE-OP UPDATE NOTE    Gynecologic Surgery Pre-Operative Attestation/update Note:  23       Facility: [address] [place] [address]  Date: 2023  Time: 9:47 AM      Patient Name: Zuleyka Correa  Patient : 1955  Room/Bed: [address] OR Pool/NONE  Admission Date/Time: 2023  7:26 AM  MRN #: [id number]  [facility] #: [id number]    Past Medical History:   Diagnosis Date    Anxiety     Chronic back pain     Depression     E. coli infection     blood from back abscess    GERD (gastroesophageal reflux disease)     Hyperlipidemia     Hypertension     Morbid obesity ([address] )     Vaginal mass          Past Surgical History:   Procedure Laterality Date    APPENDECTOMY      CHOLECYSTECTOMY      COLONOSCOPY      COLONOSCOPY N/A 05/24/2023    x2 hyperplastic x1 serrated adenoma by Jacques Bentley MD at [address] [facility] [address]  2021    CT ABSCESS DRAIN SUBCUTANEOUS 2021 [facility] CT SCAN    CYSTOSCOPY      ENDOSCOPY, COLON, DIAGNOSTIC      EGD    HEMORRHOID SURGERY      HYSTERECTOMY (CERVIX STATUS UNKNOWN)      partial - both ovaries remain; done for uterine fibroid    OTHER SURGICAL HISTORY      fissure repair    OVARIAN CYST REMOVAL      TONSILLECTOMY           No current facility-administered medications on file prior to encounter. Current Outpatient Medications on File Prior to Encounter   Medication Sig Dispense Refill    Cholecalciferol (VITAMIN D3) 125 MCG (5000 UT) TABS Take by mouth      Handicap Placard MISC by Does not apply route Issue parking placard or license plate for person with disability; Applicant meets the qualifying disability criteria. Length of time expected to have disability__X___Lifetime. Prescription expires in 5 years from issuing date.  1 each 0    atorvastatin (LIPITOR) 20 MG tablet Take 1 tablet by mouth daily 90 tablet 0    atenolol (TENORMIN) 50 MG tablet Take 1 tablet by mouth daily 90 tablet 3    losartan (COZAAR)
150 - 450 k/uL Final    MPV 08/08/2023 10.2  6.0 - 12.0 fL Final    Neutrophils % 08/08/2023 62  36 - 66 % Final    Lymphocytes % 08/08/2023 27  24 - 44 % Final    Monocytes % 08/08/2023 9 (H)  1 - 7 % Final    Eosinophils % 08/08/2023 2  0 - 4 % Final    Basophils % 08/08/2023 0  0 - 2 % Final    Neutrophils Absolute 08/08/2023 4.50  1.3 - 9.1 k/uL Final    Lymphocytes Absolute 08/08/2023 2.00  1.0 - 4.8 k/uL Final    Monocytes Absolute 08/08/2023 0.70  0.1 - 1.3 k/uL Final    Eosinophils Absolute 08/08/2023 0.10  0.0 - 0.4 k/uL Final    Basophils Absolute 08/08/2023 0.00  0.0 - 0.2 k/uL Final    Glucose 08/08/2023 97  70 - 99 mg/dL Final    BUN 08/08/2023 16  8 - 23 mg/dL Final    Creatinine 08/08/2023 0.7  0.5 - 0.9 mg/dL Final    Est, Glom Filt Rate 08/08/2023 >60  >60 mL/min/1.73m2 Final    Comment:       These results are not intended for use in patients <25years of age. eGFR results are calculated without a race factor using the 2021 CKD-EPI equation. Careful clinical correlation is recommended, particularly when comparing to results   calculated using previous equations. The CKD-EPI equation is less accurate in patients with extremes of muscle mass, extra-renal   metabolism of creatine, excessive creatine ingestion, or following therapy that affects   renal tubular secretion. Calcium 08/08/2023 9.7  8.6 - 10.4 mg/dL Final    Sodium 08/08/2023 137  135 - 144 mmol/L Final    Potassium 08/08/2023 4.6  3.7 - 5.3 mmol/L Final    SPECIMEN SLIGHTLY HEMOLYZED, RESULTS MAY BE ADVERSELY AFFECTED.     Chloride 08/08/2023 101  98 - 107 mmol/L Final    CO2 08/08/2023 27  20 - 31 mmol/L Final    Anion Gap 08/08/2023 9  9 - 17 mmol/L Final    WBC, UA 08/08/2023 6 TO 9  /HPF Final    RBC, UA 08/08/2023 3 to 5  /HPF Final    Casts UA 08/08/2023 6 TO 9  /LPF Final    Epithelial Cells UA 08/08/2023 10 TO 20  /HPF Final    Bacteria, UA 08/08/2023 FEW (A)  None Final   ]  Results for orders placed or performed

## 2023-08-23 LAB — SURGICAL PATHOLOGY REPORT: NORMAL

## 2023-09-06 ENCOUNTER — TELEMEDICINE (OUTPATIENT)
Dept: OBGYN | Age: 68
End: 2023-09-06
Payer: MEDICARE

## 2023-09-06 DIAGNOSIS — N89.8 VAGINAL INCLUSION CYST: ICD-10-CM

## 2023-09-06 DIAGNOSIS — Z09 POSTOP CHECK: Primary | ICD-10-CM

## 2023-09-06 PROCEDURE — 99024 POSTOP FOLLOW-UP VISIT: CPT | Performed by: OBSTETRICS & GYNECOLOGY

## 2023-09-06 PROCEDURE — 99211 OFF/OP EST MAY X REQ PHY/QHP: CPT | Performed by: OBSTETRICS & GYNECOLOGY

## 2023-09-06 NOTE — PROGRESS NOTES
Zuleyka Correa  2023  8:46 AM      Procedure:   Date of Procedure: 2023     Pre-Op Diagnosis Codes: * Vaginal mass [N89.8]     Post-Op Diagnosis:  Same pathology is pending       Procedure(s):  RESECTION OF Left vaginal lesion and right vaginal cystectomy      Zuleyka Correa is a 70 y.o. female is a Established patient, presenting virtually for evaluation of the followin. Postop check    2. Suspected Vaginal inclusion cyst I&D with Partial Cystectomy RIGHT 5-7 mm and Left sided vaginal nevus excision 2-4 mm 2023            TELEHEALTH EVALUATION -- Audio/Visual     Zuleyka Correa is a 70 y.o. female     She denies any complaints. She denied any shortness of breath, chest pain or dizziness. She denied any nausea, vomiting, or diarrhea. There is no fever, chills, or rigors. The patient denies any vaginal bleeding, discharge or odor. All of her pre-operative complaints are now resolved. not currently breastfeeding. The PE is limited due to the Virtual Visit    Abdominal Exam: soft non-tender. Good bowel sounds. No guarding, rebound or rigidity. No costal vertebral angle tenderness bilateral. No hernias. Evaluation with patient participation and visualization-(Viewed Virtually)    Incision: na (by video confirmation) (Viewed Virtually)    Extremities: No edema or calf pain noted bilaterally. Patient participation. (Viewed Virtually)    Pelvic Exam: Virtual visit-not completed      Results for orders placed or performed during the hospital encounter of 23   SURGICAL PATHOLOGY REPORT   Result Value Ref Range    Surgical Pathology Report       Path Number: [id number]    -- Diagnosis --  A.  VAGINAL FORNIX, LEFT, BIOPSY:-BENIGN, SUPERFICIAL SQUAMOUS MUCOSA  WITH NO SIGNIFICANT PATHOLOGIC ABNORMALITY. B.  CYST WALL, RIGHT VAGINAL, PARTIAL RESECTION:-BENIGN SQUAMOUS  MUCOSA WITH UNDERLYING FIBROCONNECTIVE TISSUE; NO DEFINITE CYST WALL  LINING IDENTIFIED.       [place]

## 2023-10-07 DIAGNOSIS — E78.5 HYPERLIPIDEMIA, UNSPECIFIED HYPERLIPIDEMIA TYPE: ICD-10-CM

## 2023-10-09 ENCOUNTER — PATIENT MESSAGE (OUTPATIENT)
Dept: FAMILY MEDICINE CLINIC | Age: 68
End: 2023-10-09

## 2023-10-09 DIAGNOSIS — F41.9 ANXIETY: Primary | ICD-10-CM

## 2023-10-09 NOTE — TELEPHONE ENCOUNTER
Joaquin Cortes called requesting a refill of the below medication which has been pended for you:     Requested Prescriptions     Pending Prescriptions Disp Refills    atorvastatin (LIPITOR) 20 MG tablet [Pharmacy Med Name: Atorvastatin Calcium 20 MG Oral Tablet] 90 tablet 3     Sig: TAKE 1 TABLET BY MOUTH ONCE  DAILY       Last Appointment Date: 7/25/2023  Next Appointment Date: 11/27/2023    Allergies   Allergen Reactions    Benazepril Other (See Comments)     cough    Lisinopril Other (See Comments)     Cough      Other      Generic Prozac \"made me feel weird\"

## 2023-10-10 NOTE — TELEPHONE ENCOUNTER
Per [facility], last fill 6/14/22, quantity 30 for 30 days. Taunya Schilder called requesting a refill of the below medication which has been pended for you:     Requested Prescriptions     Pending Prescriptions Disp Refills    LORazepam (ATIVAN) 1 MG tablet       Sig: Take by mouth as needed.        Last Appointment Date: 7/25/2023  Next Appointment Date: 11/27/2023    Allergies   Allergen Reactions    Benazepril Other (See Comments)     cough    Lisinopril Other (See Comments)     Cough      Other      Generic Prozac \"made me feel weird\"

## 2023-10-10 NOTE — TELEPHONE ENCOUNTER
From: Flavia Myers  To: Dr. Nieto Gear: 10/9/2023 11:31 AM EDT  Subject: prescription    Would you please send in an order for me for Lorazepam Tabs 1 mg. I no longer have any. We use [facility] now.      Thank you    Gen

## 2023-10-11 ENCOUNTER — TELEPHONE (OUTPATIENT)
Dept: OBGYN | Age: 68
End: 2023-10-11

## 2023-10-11 RX ORDER — ATORVASTATIN CALCIUM 20 MG/1
20 TABLET, FILM COATED ORAL DAILY
Qty: 90 TABLET | Refills: 2 | Status: SHIPPED | OUTPATIENT
Start: 2023-10-11

## 2023-10-11 RX ORDER — LORAZEPAM 1 MG/1
1 TABLET ORAL DAILY PRN
Qty: 30 TABLET | Refills: 0 | Status: SHIPPED | OUTPATIENT
Start: 2023-10-11 | End: 2023-11-10

## 2023-10-11 NOTE — TELEPHONE ENCOUNTER
Attempted to reach by phone, unable to leave message. Will attempt to contact patient at a later time. Patient needs 2nd post op appointment.

## 2023-10-12 NOTE — TELEPHONE ENCOUNTER
Controlled Substance Monitoring:    Acute and Chronic Pain Monitoring:   RX Monitoring Periodic Controlled Substance Monitoring   10/11/2023   8:27 PM No signs of potential drug abuse or diversion identified.

## 2023-10-17 NOTE — CARE COORDINATION
[facility] Transitions Follow Up Call    2021    Patient: Anthony Tim  Patient : 1955   MRN: <[id number]>  Reason for Admission:Psoas abscess   Discharge Date: 21 RARS: Readmission Risk Score: 16         Spoke with: Elizabet Carballo who states she is doing well. She is up in a chair still using bed side commode bowels are moving nausea is under control the last 24 hours. pain is limited to lumbar area using OTC pain medication. No concerns  Care Transitions Follow Up Call    Needs to be reviewed by the provider   Additional needs identified to be addressed with provider: No  none             Method of communication with provider : none      Care Transition Nurse (CTN) contacted the patient by telephone to follow up after admission on . Verified name and  with patient as identifiers. Addressed changes since last contact: doing better then last week, nausea under control is able to sit in chair this week  Discussed follow-up appointments. If no appointment was previously scheduled, appointment scheduling offered: Yes. Is follow up appointment scheduled within 7 days of discharge? Yes. Advance Care Planning:   Does patient have an Advance Directive: reviewed and current, reviewed and needs to be updated, not on file; education provided, patient declined education, decision maker updated and referral to internal ACP facilitator. CTN reviewed discharge instructions, medical action plan and red flags with patient and discussed any barriers to care and/or understanding of plan of care after discharge. Discussed appropriate site of care based on symptoms and resources available to patient including: PCP, Specialist, Home health and When to call 911. The patient agrees to contact the PCP office for questions related to their healthcare.      Patients top risk factors for readmission: functional physical ability  Interventions to address risk factors: Obtained and reviewed discharge summary and/or continuity of care documents          CTN provided contact information for future needs. Plan for follow-up call in 5-7 days based on severity of symptoms and risk factors. Plan for next call: symptom management-routine, nausea , pain        Care Transitions Subsequent and Final Call    Subsequent and Final Calls  Do you have any ongoing symptoms?: Yes  Onset of Patient-reported symptoms: Today  Patient-reported symptoms: Pain  Interventions for patient-reported symptoms: Other  Have your medications changed?: No  Do you have any questions related to your medications?: No  Do you currently have any active services?: Yes  Do you have any needs or concerns that I can assist you with?: No  Identified Barriers: Lack of Education  Care Transitions Interventions  Other Interventions:            Follow Up  Future Appointments   Date Time Provider Cal Roblero   8/25/2021 10:30 AM Nguyen Mendoza MD [facility]   9/8/2021  9:40 AM Dominga English DO [facility]       Rodrigo Velazquez LPN Dupixent Pregnancy And Lactation Text: This medication likely crosses the placenta but the risk for the fetus is uncertain. This medication is excreted in breast milk.

## 2023-11-09 ENCOUNTER — PATIENT MESSAGE (OUTPATIENT)
Dept: FAMILY MEDICINE CLINIC | Age: 68
End: 2023-11-09

## 2023-11-09 DIAGNOSIS — E03.9 HYPOTHYROIDISM, UNSPECIFIED TYPE: ICD-10-CM

## 2023-11-09 NOTE — TELEPHONE ENCOUNTER
From: Fe Zhong  To: Dr. Aretha Del Angel: 2023 12:26 PM EST  Subject: L-Thyroxine (Synthroid) Tabs 25mg    Hello I am finally out of this medication. I wanted to let you know so if you wanted to add it to my list of meds that I need to take with [facility] you can. Also do I have to fast for the blood test that you have requested for me to have done before I see you on the ? Thank you  Ravin Cowan.  Paulo Brar   [phone]

## 2023-11-15 NOTE — TELEPHONE ENCOUNTER
Xochilt Martínez called requesting a refill of the below medication which has been pended for you:     Requested Prescriptions     Pending Prescriptions Disp Refills    levothyroxine (SYNTHROID) 25 MCG tablet 45 tablet 0     Sig: Take 0.5 tablets by mouth Daily       Last Appointment Date: 7/25/2023  Next Appointment Date: 11/27/2023    Allergies   Allergen Reactions    Benazepril Other (See Comments)     cough    Lisinopril Other (See Comments)     Cough      Other      Generic Prozac \"made me feel weird\"

## 2023-11-18 RX ORDER — LEVOTHYROXINE SODIUM 0.03 MG/1
12.5 TABLET ORAL DAILY
Qty: 45 TABLET | Refills: 0 | Status: CANCELLED | OUTPATIENT
Start: 2023-11-18

## 2023-11-24 ENCOUNTER — HOSPITAL ENCOUNTER (OUTPATIENT)
Age: 68
Discharge: HOME | End: 2023-11-24
Payer: MEDICARE

## 2023-11-24 DIAGNOSIS — E03.9 HYPOTHYROIDISM, UNSPECIFIED TYPE: ICD-10-CM

## 2023-11-24 LAB — TSH SERPL DL<=0.05 MIU/L-ACNC: 4.46 UIU/ML (ref 0.3–5)

## 2023-11-24 PROCEDURE — 36415 COLL VENOUS BLD VENIPUNCTURE: CPT

## 2023-11-24 PROCEDURE — 84443 ASSAY THYROID STIM HORMONE: CPT

## 2023-11-27 ENCOUNTER — HOSPITAL ENCOUNTER (OUTPATIENT)
Dept: GENERAL RADIOLOGY | Age: 68
Discharge: HOME | End: 2023-11-29
Payer: MEDICARE

## 2023-11-27 ENCOUNTER — OFFICE VISIT (OUTPATIENT)
Dept: FAMILY MEDICINE CLINIC | Age: 68
End: 2023-11-27
Payer: MEDICARE

## 2023-11-27 VITALS
WEIGHT: 293 LBS | OXYGEN SATURATION: 97 % | DIASTOLIC BLOOD PRESSURE: 80 MMHG | HEART RATE: 70 BPM | TEMPERATURE: 97.1 F | SYSTOLIC BLOOD PRESSURE: 130 MMHG | HEIGHT: 63 IN | BODY MASS INDEX: 51.91 KG/M2 | RESPIRATION RATE: 16 BRPM

## 2023-11-27 DIAGNOSIS — M79.644 PAIN OF RIGHT THUMB: ICD-10-CM

## 2023-11-27 DIAGNOSIS — E03.9 HYPOTHYROIDISM, UNSPECIFIED TYPE: Primary | ICD-10-CM

## 2023-11-27 DIAGNOSIS — N94.9 LUMP IN VAGINA: ICD-10-CM

## 2023-11-27 DIAGNOSIS — E78.5 HYPERLIPIDEMIA, UNSPECIFIED HYPERLIPIDEMIA TYPE: ICD-10-CM

## 2023-11-27 DIAGNOSIS — R73.01 IMPAIRED FASTING GLUCOSE: ICD-10-CM

## 2023-11-27 PROCEDURE — 3074F SYST BP LT 130 MM HG: CPT | Performed by: FAMILY MEDICINE

## 2023-11-27 PROCEDURE — 3017F COLORECTAL CA SCREEN DOC REV: CPT | Performed by: FAMILY MEDICINE

## 2023-11-27 PROCEDURE — G8427 DOCREV CUR MEDS BY ELIG CLIN: HCPCS | Performed by: FAMILY MEDICINE

## 2023-11-27 PROCEDURE — G8399 PT W/DXA RESULTS DOCUMENT: HCPCS | Performed by: FAMILY MEDICINE

## 2023-11-27 PROCEDURE — G8417 CALC BMI ABV UP PARAM F/U: HCPCS | Performed by: FAMILY MEDICINE

## 2023-11-27 PROCEDURE — 1036F TOBACCO NON-USER: CPT | Performed by: FAMILY MEDICINE

## 2023-11-27 PROCEDURE — 3078F DIAST BP <80 MM HG: CPT | Performed by: FAMILY MEDICINE

## 2023-11-27 PROCEDURE — 1123F ACP DISCUSS/DSCN MKR DOCD: CPT | Performed by: FAMILY MEDICINE

## 2023-11-27 PROCEDURE — G8484 FLU IMMUNIZE NO ADMIN: HCPCS | Performed by: FAMILY MEDICINE

## 2023-11-27 PROCEDURE — 99214 OFFICE O/P EST MOD 30 MIN: CPT | Performed by: FAMILY MEDICINE

## 2023-11-27 PROCEDURE — 1090F PRES/ABSN URINE INCON ASSESS: CPT | Performed by: FAMILY MEDICINE

## 2023-11-27 PROCEDURE — 73130 X-RAY EXAM OF HAND: CPT

## 2023-11-27 RX ORDER — LORAZEPAM 1 MG/1
1 TABLET ORAL DAILY PRN
COMMUNITY

## 2023-11-27 RX ORDER — LEVOTHYROXINE SODIUM 0.03 MG/1
12.5 TABLET ORAL DAILY
Qty: 45 TABLET | Refills: 1 | Status: SHIPPED | OUTPATIENT
Start: 2023-11-27

## 2023-11-27 NOTE — PROGRESS NOTES
[facility]  [address]  [phone]      Avery Black is a 70 y.o. female who presents today for her medical conditions/complaints as noted below. Avery Black is c/o of Hypertension and Hypothyroidism      HPI:     Pt here today for follow-up of hypothyroidism. Results reviewed with pt during OV today - TSH still normal.  Will continue Synthroid 12.5 mcg daily (1/2 of a 25 mcg tab) and re-check TSH again in 6 months. Pt still feeling lump when she goes to the restroom - has f/u appt in 2 days with Dr. Richard Cohen. Does not feel that the procedure in August resolved anything.     Still having pain at R base of thumb from a fall over the summer - fell off the bed         Past Medical History:   Diagnosis Date    Anxiety     Chronic back pain     Depression     E. coli infection     blood from back abscess    GERD (gastroesophageal reflux disease)     Hyperlipidemia     Hypertension     Morbid obesity ([address])     Vaginal mass       Past Surgical History:   Procedure Laterality Date    APPENDECTOMY  1982    CHOLECYSTECTOMY      COLONOSCOPY  2005    COLONOSCOPY N/A 05/24/2023    x2 hyperplastic x1 serrated adenoma by Antwon Jolly MD at [address] [facility] [address]  08/18/2021    CT ABSCESS DRAIN SUBCUTANEOUS 8/18/2021 [facility] CT SCAN    CYSTOSCOPY  2005    ENDOSCOPY, COLON, DIAGNOSTIC      EGD    HEMORRHOID SURGERY      HYSTERECTOMY (CERVIX STATUS UNKNOWN)  1984    partial - both ovaries remain; done for uterine fibroid    OTHER SURGICAL HISTORY      fissure repair    OVARIAN CYST REMOVAL  1976    TONSILLECTOMY      VAGINA SURGERY N/A 8/21/2023    RESECTION OF Left vaginal lesion and right vaginal cystectomy performed by Laya Garrett DO at [address]     Family History   Problem Relation Age of Onset    High Blood Pressure Mother     Miscarriages / Stillbirths Mother     Osteoporosis Mother     Heart Disease Father     High

## 2023-11-30 ENCOUNTER — TELEPHONE (OUTPATIENT)
Dept: OBGYN | Age: 68
End: 2023-11-30

## 2024-02-06 NOTE — PROGRESS NOTES
Labor Analgesia Follow Up Note    Patient underwent epidural anesthesia for labor analgesia,    Placenta Date/Time: 2/5/2024  3:03 PM     Delivery Date/Time:: 2/5/2024   3:01 PM     BP 99/55 (BP Location: Left arm)   Pulse 68   Temp 97.9 °F (36.6 °C) (Oral)   Resp 16   Ht 1.575 m (5' 2.01\")   Wt 69.4 kg (153 lb)   LMP 05/06/2023 (Approximate)   SpO2 96%   Breastfeeding Yes   BMI 27.98 kg/m²     Assessment:  Patient seen and no apparent anesthesia related complications.    Thank you for asking us to participate in the care of your patient.     Pharmacy Note  Vancomycin Consult    Andreea Pittman is a 78 y.o. female started on Vancomycin for SSTI; consult received from Tona Cueto to manage therapy. Also receiving the following antibiotics: cefepime. Patient Active Problem List   Diagnosis    Hypertension    Hyperlipidemia    GERD (gastroesophageal reflux disease)    Morbid obesity (HCC)    Depression    Chronic back pain    Psoas abscess (HCC)    Hyponatremia    Lactic acid increased    Anxiety    Osteomyelitis of lumbar spine (HCC)     Allergies:  Benazepril, Dynabac [dirithromycin], Levothyroxine, Lisinopril, and Other     Temp max: 98.4    Recent Labs     08/17/21  1145 08/18/21  1017   BUN 10 7*   CREATININE 0.53 0.52   WBC 8.9 6.4     No intake or output data in the 24 hours ending 08/18/21 1318  Culture Date      Source                       Results  No results    Ht Readings from Last 1 Encounters:   08/18/21 5' 2\" (1.575 m)        Wt Readings from Last 1 Encounters:   08/18/21 270 lb (122.5 kg)       Body mass index is 49.38 kg/m². Estimated Creatinine Clearance: 131 mL/min (based on SCr of 0.52 mg/dL). Goal Trough Level: 10-20 mcg/mL    Assessment/Plan:  Patient has received 2000mg of vancomycin IV loading dose once on 8/17, followed by 1500mg q12h. After submitting information to [facility], vancomycin 1250mg q12h provides a trough of 15.8mcg/mL and AUC of 512. Ordered vancomycin 1250mg q12h. Timing of trough level will be determined based on culture results, renal function, and clinical response. Thank you for the consult. Will continue to follow.    Patsy Domingo, PharmD 8/18/2021 1:22 PM

## 2024-03-26 DIAGNOSIS — Z78.0 POST-MENOPAUSAL: Primary | ICD-10-CM

## 2024-03-26 DIAGNOSIS — Z12.31 ENCOUNTER FOR SCREENING MAMMOGRAM FOR MALIGNANT NEOPLASM OF BREAST: ICD-10-CM

## 2024-04-15 ENCOUNTER — HOSPITAL ENCOUNTER (OUTPATIENT)
Dept: MAMMOGRAPHY | Age: 69
Discharge: HOME | End: 2024-04-17
Payer: MEDICARE

## 2024-04-15 VITALS — WEIGHT: 290 LBS | BODY MASS INDEX: 52.2 KG/M2

## 2024-04-15 DIAGNOSIS — Z78.0 POST-MENOPAUSAL: ICD-10-CM

## 2024-04-15 DIAGNOSIS — Z12.31 ENCOUNTER FOR SCREENING MAMMOGRAM FOR MALIGNANT NEOPLASM OF BREAST: ICD-10-CM

## 2024-04-15 PROCEDURE — 77063 BREAST TOMOSYNTHESIS BI: CPT

## 2024-05-22 DIAGNOSIS — E03.9 HYPOTHYROIDISM, UNSPECIFIED TYPE: ICD-10-CM

## 2024-05-22 DIAGNOSIS — I10 ESSENTIAL HYPERTENSION: ICD-10-CM

## 2024-05-22 RX ORDER — ATENOLOL 50 MG/1
50 TABLET ORAL DAILY
Qty: 90 TABLET | Refills: 3 | OUTPATIENT
Start: 2024-05-22

## 2024-05-22 RX ORDER — LOSARTAN POTASSIUM 25 MG/1
25 TABLET ORAL DAILY
Qty: 90 TABLET | Refills: 3 | OUTPATIENT
Start: 2024-05-22

## 2024-05-22 RX ORDER — LEVOTHYROXINE SODIUM 0.03 MG/1
12.5 TABLET ORAL DAILY
Qty: 45 TABLET | Refills: 3 | OUTPATIENT
Start: 2024-05-22

## 2024-05-22 NOTE — TELEPHONE ENCOUNTER
Pt has OV 5/28, will complete labs prior to OV. States she has plenty of her meds and does not need refill yet, states [facility] sends them too quickly. Will let us know when she needs refills.

## 2024-05-23 ENCOUNTER — HOSPITAL ENCOUNTER (OUTPATIENT)
Age: 69
Discharge: HOME | End: 2024-05-23
Payer: MEDICARE

## 2024-05-23 DIAGNOSIS — R73.01 IMPAIRED FASTING GLUCOSE: ICD-10-CM

## 2024-05-23 DIAGNOSIS — E78.5 HYPERLIPIDEMIA, UNSPECIFIED HYPERLIPIDEMIA TYPE: ICD-10-CM

## 2024-05-23 DIAGNOSIS — E03.9 HYPOTHYROIDISM, UNSPECIFIED TYPE: ICD-10-CM

## 2024-05-23 LAB
ALBUMIN SERPL-MCNC: 4.1 G/DL (ref 3.5–5.2)
ALBUMIN/GLOB SERPL: 1.4 {RATIO} (ref 1–2.5)
ALP SERPL-CCNC: 86 U/L (ref 35–104)
ALT SERPL-CCNC: 12 U/L (ref 5–33)
ANION GAP SERPL CALCULATED.3IONS-SCNC: 9 MMOL/L (ref 9–17)
AST SERPL-CCNC: 16 U/L
BILIRUB SERPL-MCNC: 0.4 MG/DL (ref 0.3–1.2)
BUN SERPL-MCNC: 17 MG/DL (ref 8–23)
BUN/CREAT SERPL: 24 (ref 9–20)
CALCIUM SERPL-MCNC: 9.9 MG/DL (ref 8.6–10.4)
CHLORIDE SERPL-SCNC: 104 MMOL/L (ref 98–107)
CHOLEST SERPL-MCNC: 203 MG/DL (ref 0–199)
CHOLESTEROL/HDL RATIO: 6
CO2 SERPL-SCNC: 27 MMOL/L (ref 20–31)
CREAT SERPL-MCNC: 0.7 MG/DL (ref 0.5–0.9)
EST. AVERAGE GLUCOSE BLD GHB EST-MCNC: 108 MG/DL
GFR, ESTIMATED: >90 ML/MIN/1.73M2
GLUCOSE SERPL-MCNC: 107 MG/DL (ref 70–99)
HBA1C MFR BLD: 5.4 % (ref 4–6)
HDLC SERPL-MCNC: 35 MG/DL
LDLC SERPL CALC-MCNC: 135 MG/DL (ref 0–100)
POTASSIUM SERPL-SCNC: 4.8 MMOL/L (ref 3.7–5.3)
PROT SERPL-MCNC: 7.1 G/DL (ref 6.4–8.3)
SODIUM SERPL-SCNC: 140 MMOL/L (ref 135–144)
TRIGL SERPL-MCNC: 166 MG/DL
TSH SERPL DL<=0.05 MIU/L-ACNC: 4.34 UIU/ML (ref 0.3–5)
VLDLC SERPL CALC-MCNC: 33 MG/DL

## 2024-05-23 PROCEDURE — 80053 COMPREHEN METABOLIC PANEL: CPT

## 2024-05-23 PROCEDURE — 36415 COLL VENOUS BLD VENIPUNCTURE: CPT

## 2024-05-23 PROCEDURE — 83036 HEMOGLOBIN GLYCOSYLATED A1C: CPT

## 2024-05-23 PROCEDURE — 84443 ASSAY THYROID STIM HORMONE: CPT

## 2024-05-23 PROCEDURE — 80061 LIPID PANEL: CPT

## 2024-05-28 ENCOUNTER — OFFICE VISIT (OUTPATIENT)
Dept: FAMILY MEDICINE CLINIC | Age: 69
End: 2024-05-28
Payer: MEDICARE

## 2024-05-28 VITALS
HEART RATE: 86 BPM | WEIGHT: 286.7 LBS | BODY MASS INDEX: 50.8 KG/M2 | HEIGHT: 63 IN | TEMPERATURE: 97.7 F | OXYGEN SATURATION: 96 % | DIASTOLIC BLOOD PRESSURE: 70 MMHG | RESPIRATION RATE: 18 BRPM | SYSTOLIC BLOOD PRESSURE: 106 MMHG

## 2024-05-28 DIAGNOSIS — G89.29 CHRONIC PAIN OF BOTH KNEES: ICD-10-CM

## 2024-05-28 DIAGNOSIS — I10 ESSENTIAL HYPERTENSION: Primary | ICD-10-CM

## 2024-05-28 DIAGNOSIS — M25.562 CHRONIC PAIN OF BOTH KNEES: ICD-10-CM

## 2024-05-28 DIAGNOSIS — E03.9 HYPOTHYROIDISM, UNSPECIFIED TYPE: ICD-10-CM

## 2024-05-28 DIAGNOSIS — R73.01 IMPAIRED FASTING GLUCOSE: ICD-10-CM

## 2024-05-28 DIAGNOSIS — E78.5 HYPERLIPIDEMIA, UNSPECIFIED HYPERLIPIDEMIA TYPE: ICD-10-CM

## 2024-05-28 DIAGNOSIS — M25.561 CHRONIC PAIN OF BOTH KNEES: ICD-10-CM

## 2024-05-28 PROCEDURE — 3074F SYST BP LT 130 MM HG: CPT | Performed by: FAMILY MEDICINE

## 2024-05-28 PROCEDURE — 1036F TOBACCO NON-USER: CPT | Performed by: FAMILY MEDICINE

## 2024-05-28 PROCEDURE — 3078F DIAST BP <80 MM HG: CPT | Performed by: FAMILY MEDICINE

## 2024-05-28 PROCEDURE — G8427 DOCREV CUR MEDS BY ELIG CLIN: HCPCS | Performed by: FAMILY MEDICINE

## 2024-05-28 PROCEDURE — 3017F COLORECTAL CA SCREEN DOC REV: CPT | Performed by: FAMILY MEDICINE

## 2024-05-28 PROCEDURE — 99214 OFFICE O/P EST MOD 30 MIN: CPT | Performed by: FAMILY MEDICINE

## 2024-05-28 PROCEDURE — G2211 COMPLEX E/M VISIT ADD ON: HCPCS | Performed by: FAMILY MEDICINE

## 2024-05-28 PROCEDURE — 1090F PRES/ABSN URINE INCON ASSESS: CPT | Performed by: FAMILY MEDICINE

## 2024-05-28 PROCEDURE — 1123F ACP DISCUSS/DSCN MKR DOCD: CPT | Performed by: FAMILY MEDICINE

## 2024-05-28 PROCEDURE — G8399 PT W/DXA RESULTS DOCUMENT: HCPCS | Performed by: FAMILY MEDICINE

## 2024-05-28 PROCEDURE — G8417 CALC BMI ABV UP PARAM F/U: HCPCS | Performed by: FAMILY MEDICINE

## 2024-05-28 RX ORDER — LOSARTAN POTASSIUM 25 MG/1
25 TABLET ORAL DAILY
Qty: 90 TABLET | Refills: 3 | Status: SHIPPED | OUTPATIENT
Start: 2024-05-28

## 2024-05-28 RX ORDER — ONDANSETRON 4 MG/1
4 TABLET, ORALLY DISINTEGRATING ORAL EVERY 8 HOURS PRN
Qty: 30 TABLET | Refills: 0 | Status: SHIPPED | OUTPATIENT
Start: 2024-05-28

## 2024-05-28 RX ORDER — ATENOLOL 50 MG/1
50 TABLET ORAL DAILY
Qty: 90 TABLET | Refills: 3 | Status: SHIPPED | OUTPATIENT
Start: 2024-05-28

## 2024-05-28 RX ORDER — LEVOTHYROXINE SODIUM 0.03 MG/1
12.5 TABLET ORAL DAILY
Qty: 45 TABLET | Refills: 3 | Status: SHIPPED | OUTPATIENT
Start: 2024-05-28

## 2024-05-28 RX ORDER — ATORVASTATIN CALCIUM 20 MG/1
20 TABLET, FILM COATED ORAL DAILY
Qty: 90 TABLET | Refills: 2 | Status: SHIPPED | OUTPATIENT
Start: 2024-05-28

## 2024-05-28 ASSESSMENT — PATIENT HEALTH QUESTIONNAIRE - PHQ9
10. IF YOU CHECKED OFF ANY PROBLEMS, HOW DIFFICULT HAVE THESE PROBLEMS MADE IT FOR YOU TO DO YOUR WORK, TAKE CARE OF THINGS AT HOME, OR GET ALONG WITH OTHER PEOPLE: NOT DIFFICULT AT ALL
9. THOUGHTS THAT YOU WOULD BE BETTER OFF DEAD, OR OF HURTING YOURSELF: NOT AT ALL
1. LITTLE INTEREST OR PLEASURE IN DOING THINGS: NOT AT ALL
5. POOR APPETITE OR OVEREATING: NOT AT ALL
8. MOVING OR SPEAKING SO SLOWLY THAT OTHER PEOPLE COULD HAVE NOTICED. OR THE OPPOSITE, BEING SO FIGETY OR RESTLESS THAT YOU HAVE BEEN MOVING AROUND A LOT MORE THAN USUAL: SEVERAL DAYS
SUM OF ALL RESPONSES TO PHQ9 QUESTIONS 1 & 2: 0
4. FEELING TIRED OR HAVING LITTLE ENERGY: NOT AT ALL
SUM OF ALL RESPONSES TO PHQ QUESTIONS 1-9: 1
2. FEELING DOWN, DEPRESSED OR HOPELESS: NOT AT ALL
7. TROUBLE CONCENTRATING ON THINGS, SUCH AS READING THE NEWSPAPER OR WATCHING TELEVISION: NOT AT ALL
SUM OF ALL RESPONSES TO PHQ QUESTIONS 1-9: 1
3. TROUBLE FALLING OR STAYING ASLEEP: NOT AT ALL
6. FEELING BAD ABOUT YOURSELF - OR THAT YOU ARE A FAILURE OR HAVE LET YOURSELF OR YOUR FAMILY DOWN: NOT AT ALL

## 2024-05-28 NOTE — PROGRESS NOTES
Cardiovascular:      Rate and Rhythm: Normal rate and regular rhythm.      Heart sounds: Normal heart sounds.   Pulmonary:      Effort: Pulmonary effort is normal. No respiratory distress.      Breath sounds: Normal breath sounds.   Abdominal:      General: Bowel sounds are normal. There is no distension.      Palpations: Abdomen is soft.      Tenderness: There is no abdominal tenderness.   Musculoskeletal:      Right lower leg: No edema.      Left lower leg: No edema.   Skin:     General: Skin is warm and dry.   Neurological:      General: No focal deficit present.      Mental Status: She is alert and oriented to person, place, and time.   Psychiatric:         Mood and Affect: Mood normal.         Assessment:      1. Essential hypertension  -     atenolol (TENORMIN) 50 MG tablet; Take 1 tablet by mouth daily, Disp-90 tablet, R-3Normal  -     losartan (COZAAR) 25 MG tablet; Take 1 tablet by mouth daily, Disp-90 tablet, R-3Normal  2. Hyperlipidemia, unspecified hyperlipidemia type  -     atorvastatin (LIPITOR) 20 MG tablet; Take 1 tablet by mouth daily, Disp-90 tablet, R-2Normal  -     Lipid Panel; Future  -     Comprehensive Metabolic Panel; Future  3. Hypothyroidism, unspecified type  -     levothyroxine (SYNTHROID) 25 MCG tablet; Take 0.5 tablets by mouth Daily, Disp-45 tablet, R-3Normal  -     TSH With Reflex Ft4; Future  4. Impaired fasting glucose  -     Comprehensive Metabolic Panel; Future  -     Hemoglobin A1C; Future  5. Chronic pain of both knees  -     Misc. Devices (TOILET SAFETY FRAME) MISC; Disp-1 each, R-0, PrintUse around toilet daily for difficulty standing from toilet.         Plan:      Return in about 21 weeks (around 10/22/2024) for f/u HTN.    Orders Placed This Encounter   Procedures    TSH With Reflex Ft4     Standing Status:   Future     Standing Expiration Date:   5/28/2025    Lipid Panel     Standing Status:   Future     Standing Expiration Date:   5/28/2025    Comprehensive Metabolic

## 2024-05-31 ENCOUNTER — TELEMEDICINE (OUTPATIENT)
Dept: FAMILY MEDICINE CLINIC | Age: 69
End: 2024-05-31

## 2024-05-31 DIAGNOSIS — Z00.00 MEDICARE ANNUAL WELLNESS VISIT, SUBSEQUENT: Primary | ICD-10-CM

## 2024-05-31 SDOH — HEALTH STABILITY: PHYSICAL HEALTH: ON AVERAGE, HOW MANY MINUTES DO YOU ENGAGE IN EXERCISE AT THIS LEVEL?: 0 MIN

## 2024-05-31 SDOH — HEALTH STABILITY: PHYSICAL HEALTH: ON AVERAGE, HOW MANY DAYS PER WEEK DO YOU ENGAGE IN MODERATE TO STRENUOUS EXERCISE (LIKE A BRISK WALK)?: 0 DAYS

## 2024-05-31 ASSESSMENT — LIFESTYLE VARIABLES
HOW MANY STANDARD DRINKS CONTAINING ALCOHOL DO YOU HAVE ON A TYPICAL DAY: 1 OR 2
HOW MANY STANDARD DRINKS CONTAINING ALCOHOL DO YOU HAVE ON A TYPICAL DAY: 1
HOW OFTEN DO YOU HAVE A DRINK CONTAINING ALCOHOL: NEVER
HOW OFTEN DO YOU HAVE A DRINK CONTAINING ALCOHOL: 1
HOW OFTEN DO YOU HAVE SIX OR MORE DRINKS ON ONE OCCASION: 1

## 2024-05-31 ASSESSMENT — PATIENT HEALTH QUESTIONNAIRE - PHQ9
SUM OF ALL RESPONSES TO PHQ QUESTIONS 1-9: 1
SUM OF ALL RESPONSES TO PHQ9 QUESTIONS 1 & 2: 1
2. FEELING DOWN, DEPRESSED OR HOPELESS: SEVERAL DAYS
SUM OF ALL RESPONSES TO PHQ QUESTIONS 1-9: 1
SUM OF ALL RESPONSES TO PHQ QUESTIONS 1-9: 1
1. LITTLE INTEREST OR PLEASURE IN DOING THINGS: NOT AT ALL
SUM OF ALL RESPONSES TO PHQ QUESTIONS 1-9: 1

## 2024-05-31 NOTE — PROGRESS NOTES
Medicare Annual Wellness Visit    Sharlene Nava is here for Medicare AWV    Assessment & Plan   Medicare annual wellness visit, subsequent    Recommendations for Preventive Services Due: see orders and patient instructions/AVS.  Recommended screening schedule for the next 5-10 years is provided to the patient in written form: see Patient Instructions/AVS.     Return in 1 year (on 5/31/2025).     Subjective     Patient's complete Health Risk Assessment and screening values have been reviewed and are found in Flowsheets. The following problems were reviewed today and where indicated follow up appointments were made and/or referrals ordered.    Positive Risk Factor Screenings with Interventions:    Fall Risk:  Do you feel unsteady or are you worried about falling? : (!) yes  2 or more falls in past year?: (!) yes  Fall with injury in past year?: (!) yes     Interventions:    Reviewed medications, home hazards, visual acuity, and co-morbidities that can increase risk for falls  Pt states her last fall was in Aug of 2023. Previously she had multiple falls starting in March of 2023 and was discussed during OV with Dr. Oneill. Does occasionally feel unsteady and is worried about falling but has a walker if needed and watches her surroundings.             General HRA Questions:  Select all that apply: (!) Stress    Stress Interventions:  Patient comments: states everyone at home has been sick and this has been stressful on her for the past couple weeks.       Activity, Diet, and Weight:  On average, how many days per week do you engage in moderate to strenuous exercise (like a brisk walk)?: 0 days  On average, how many minutes do you engage in exercise at this level?: 0 min    Do you eat balanced/healthy meals regularly?: Yes    There is no height or weight on file to calculate BMI. (!) Abnormal      Inactivity Interventions:  Patient comments: does not participate in any regular exercise program but is trying to be

## 2024-07-01 ENCOUNTER — PATIENT MESSAGE (OUTPATIENT)
Dept: FAMILY MEDICINE CLINIC | Age: 69
End: 2024-07-01

## 2024-07-02 NOTE — TELEPHONE ENCOUNTER
From: Sharlene Nava  To: Dr. Magi Oneill  Sent: 2024 2:05 PM EDT  Subject: Scooter    Hello this isn't really an emergency but more of time sensitive. My scooter  and [facility] [place], [place].  They will fix the scooter but I need a recent note from my doctor's office showing the need for the scooter and a prescription to fix the scooter.   I got the scooter 2020.  Since they are licensed to fix scooter's they can send the repair cost to medicare as long as they have a prescription for \"scooter repair\" and that I need the scooter.   Their fax number is [phone]  Please don't make me come back into the office for the subscription. We leave for vacation   If you need to talk my number is [phone].    Thank you   Vesna

## 2024-07-02 NOTE — TELEPHONE ENCOUNTER
Call to pt, OK to do dedicated VV to discuss scooter and new rx on 7/9. Pt aware KB will connect after 4PM.

## 2024-07-09 ENCOUNTER — TELEMEDICINE (OUTPATIENT)
Dept: FAMILY MEDICINE CLINIC | Age: 69
End: 2024-07-09
Payer: MEDICARE

## 2024-07-09 DIAGNOSIS — M54.50 CHRONIC BILATERAL LOW BACK PAIN, UNSPECIFIED WHETHER SCIATICA PRESENT: ICD-10-CM

## 2024-07-09 DIAGNOSIS — G89.29 CHRONIC PAIN OF BOTH KNEES: Primary | ICD-10-CM

## 2024-07-09 DIAGNOSIS — E66.01 MORBID OBESITY (HCC): ICD-10-CM

## 2024-07-09 DIAGNOSIS — M25.562 CHRONIC PAIN OF BOTH KNEES: Primary | ICD-10-CM

## 2024-07-09 DIAGNOSIS — G89.29 CHRONIC BILATERAL LOW BACK PAIN, UNSPECIFIED WHETHER SCIATICA PRESENT: ICD-10-CM

## 2024-07-09 DIAGNOSIS — M25.561 CHRONIC PAIN OF BOTH KNEES: Primary | ICD-10-CM

## 2024-07-09 PROCEDURE — 1123F ACP DISCUSS/DSCN MKR DOCD: CPT | Performed by: FAMILY MEDICINE

## 2024-07-09 PROCEDURE — 3017F COLORECTAL CA SCREEN DOC REV: CPT | Performed by: FAMILY MEDICINE

## 2024-07-09 PROCEDURE — G8427 DOCREV CUR MEDS BY ELIG CLIN: HCPCS | Performed by: FAMILY MEDICINE

## 2024-07-09 PROCEDURE — 1090F PRES/ABSN URINE INCON ASSESS: CPT | Performed by: FAMILY MEDICINE

## 2024-07-09 PROCEDURE — G2211 COMPLEX E/M VISIT ADD ON: HCPCS | Performed by: FAMILY MEDICINE

## 2024-07-09 PROCEDURE — G8399 PT W/DXA RESULTS DOCUMENT: HCPCS | Performed by: FAMILY MEDICINE

## 2024-07-09 PROCEDURE — 99213 OFFICE O/P EST LOW 20 MIN: CPT | Performed by: FAMILY MEDICINE

## 2024-07-09 NOTE — PROGRESS NOTES
2024    TELEHEALTH EVALUATION -- Audio/Visual    HPI:    Sharlene Nava (:  1955) has requested an audio/video evaluation for the following concern(s):    Pt's current scooter is in need of repair; has not had it in the past month  Stopped working randomly; took it to [facility] in [place] and they said the knob that controls the speed/movement is shorting and only works intermittently    Has had a few falls in the past year  Has had to use a walker since her scooter has been not functioning        Review of Systems   Musculoskeletal:  Positive for arthralgias, back pain and gait problem.       Prior to Visit Medications    Medication Sig Taking? Authorizing Provider   atenolol (TENORMIN) 50 MG tablet Take 1 tablet by mouth daily Yes Magi Oneill DO   losartan (COZAAR) 25 MG tablet Take 1 tablet by mouth daily Yes Magi Oneill DO   atorvastatin (LIPITOR) 20 MG tablet Take 1 tablet by mouth daily Yes Magi Oneill DO   levothyroxine (SYNTHROID) 25 MCG tablet Take 0.5 tablets by mouth Daily Yes Magi Oneill DO   ondansetron (ZOFRAN-ODT) 4 MG disintegrating tablet Take 1 tablet by mouth every 8 hours as needed for Nausea or Vomiting Yes Magi Oneill DO   Misc. Devices (TOILET SAFETY FRAME) MISC Use around toilet daily for difficulty standing from toilet. Yes Magi Oneill DO   LORazepam (ATIVAN) 1 MG tablet Take 1 tablet by mouth daily as needed for Anxiety. Yes Marcus Mendez MD   buPROPion (WELLBUTRIN XL) 300 MG extended release tablet as needed Takes PRN Yes Marcus Mendez MD   Cholecalciferol (VITAMIN D3) 50 MCG ( UT) CAPS Take by mouth Yes Marcus Mendez MD   Handicap Placard MISC by Does not apply route Issue parking placard or license plate for person with disability; Applicant meets the qualifying disability criteria. Length of time expected to have disability__X___Lifetime.  Prescription expires in 5 years from issuing date. Yes Magi Oneill DO

## 2024-07-16 ASSESSMENT — ENCOUNTER SYMPTOMS: BACK PAIN: 1

## 2024-07-22 ENCOUNTER — TELEPHONE (OUTPATIENT)
Dept: FAMILY MEDICINE CLINIC | Age: 69
End: 2024-07-22

## 2024-07-22 NOTE — TELEPHONE ENCOUNTER
Pt calling on status of script for scooter (see encounter 7-1) to be faxed post VV on 7-9, please advise pt as to if this has been done as she is waiting on script.

## 2024-09-19 DIAGNOSIS — E03.9 HYPOTHYROIDISM, UNSPECIFIED TYPE: ICD-10-CM

## 2024-09-19 RX ORDER — LEVOTHYROXINE SODIUM 25 UG/1
12.5 TABLET ORAL DAILY
Qty: 45 TABLET | Refills: 3 | OUTPATIENT
Start: 2024-09-19

## 2024-09-19 NOTE — TELEPHONE ENCOUNTER
Script was sent 5/28/24 for year supply. Patient made aware of this. Pt rechecked her optum options and found it and is requesting refill now. Declining request.

## 2024-10-16 ENCOUNTER — OFFICE VISIT (OUTPATIENT)
Dept: FAMILY MEDICINE CLINIC | Age: 69
End: 2024-10-16

## 2024-10-16 VITALS
OXYGEN SATURATION: 97 % | WEIGHT: 290.6 LBS | SYSTOLIC BLOOD PRESSURE: 128 MMHG | HEIGHT: 63 IN | RESPIRATION RATE: 18 BRPM | DIASTOLIC BLOOD PRESSURE: 76 MMHG | HEART RATE: 69 BPM | BODY MASS INDEX: 51.49 KG/M2 | TEMPERATURE: 97.1 F

## 2024-10-16 DIAGNOSIS — E03.9 HYPOTHYROIDISM, UNSPECIFIED TYPE: ICD-10-CM

## 2024-10-16 DIAGNOSIS — M47.816 OSTEOARTHRITIS OF LUMBAR SPINE, UNSPECIFIED SPINAL OSTEOARTHRITIS COMPLICATION STATUS: ICD-10-CM

## 2024-10-16 DIAGNOSIS — I10 ESSENTIAL HYPERTENSION: Primary | ICD-10-CM

## 2024-10-16 DIAGNOSIS — R60.0 BILATERAL EDEMA OF LOWER EXTREMITY: ICD-10-CM

## 2024-10-16 DIAGNOSIS — G89.29 CHRONIC PAIN OF BOTH KNEES: ICD-10-CM

## 2024-10-16 DIAGNOSIS — M25.562 CHRONIC PAIN OF BOTH KNEES: ICD-10-CM

## 2024-10-16 DIAGNOSIS — M25.561 CHRONIC PAIN OF BOTH KNEES: ICD-10-CM

## 2024-10-16 RX ORDER — FUROSEMIDE 20 MG/1
20 TABLET ORAL DAILY PRN
Qty: 90 TABLET | Refills: 1 | Status: SHIPPED | OUTPATIENT
Start: 2024-10-16

## 2024-10-16 SDOH — ECONOMIC STABILITY: FOOD INSECURITY: WITHIN THE PAST 12 MONTHS, YOU WORRIED THAT YOUR FOOD WOULD RUN OUT BEFORE YOU GOT MONEY TO BUY MORE.: NEVER TRUE

## 2024-10-16 SDOH — ECONOMIC STABILITY: FOOD INSECURITY: WITHIN THE PAST 12 MONTHS, THE FOOD YOU BOUGHT JUST DIDN'T LAST AND YOU DIDN'T HAVE MONEY TO GET MORE.: NEVER TRUE

## 2024-10-16 SDOH — ECONOMIC STABILITY: INCOME INSECURITY: HOW HARD IS IT FOR YOU TO PAY FOR THE VERY BASICS LIKE FOOD, HOUSING, MEDICAL CARE, AND HEATING?: NOT HARD AT ALL

## 2024-10-16 NOTE — PROGRESS NOTES
45 tablet 3    ondansetron (ZOFRAN-ODT) 4 MG disintegrating tablet Take 1 tablet by mouth every 8 hours as needed for Nausea or Vomiting 30 tablet 0    LORazepam (ATIVAN) 1 MG tablet Take 1 tablet by mouth daily as needed for Anxiety.      Cholecalciferol (VITAMIN D3) 50 MCG (2000 UT) CAPS Take by mouth       No current facility-administered medications for this visit.     Allergies   Allergen Reactions    Benazepril Other (See Comments)     cough    Lisinopril Other (See Comments)     Cough      Other      Generic Prozac \"made me feel weird\"       Health Maintenance   Topic Date Due    Respiratory Syncytial Virus (RSV) Pregnant or age 60 yrs+ (1 - 1-dose 60+ series) Never done    COVID-19 Vaccine (3 - 2023-24 season) 09/01/2024    Flu vaccine (1) 10/16/2025 (Originally 8/1/2024)    Lipids  05/23/2025    Depression Monitoring  05/31/2025    Annual Wellness Visit (Medicare)  06/01/2025    Breast cancer screen  04/15/2026    Diabetes screen  05/23/2027    DTaP/Tdap/Td vaccine (4 - Td or Tdap) 11/06/2029    Colorectal Cancer Screen  05/24/2033    DEXA (modify frequency per FRAX score)  Completed    Shingles vaccine  Completed    Pneumococcal 65+ years Vaccine  Completed    Hepatitis C screen  Completed    Hepatitis A vaccine  Aged Out    Hepatitis B vaccine  Aged Out    Hib vaccine  Aged Out    Polio vaccine  Aged Out    Meningococcal (ACWY) vaccine  Aged Out       Subjective:      Review of Systems   Constitutional:  Unexpected weight change: down 4 lbs since 11/2023.   Cardiovascular:  Positive for leg swelling (intermittent).   Gastrointestinal:  Negative for blood in stool.   Genitourinary:  Negative for dysuria and hematuria.   Musculoskeletal:  Positive for arthralgias (b/l knees - chronic).   Psychiatric/Behavioral:  Positive for sleep disturbance (recently has had some nights without sleep and then naps all day, due to helping her grand-daughter with her baby).        Objective:     Vitals:    10/16/24 1542

## 2025-01-09 RX ORDER — LORAZEPAM 1 MG/1
TABLET ORAL
Qty: 30 TABLET | OUTPATIENT
Start: 2025-01-09

## 2025-01-09 NOTE — TELEPHONE ENCOUNTER
Patient reports she does not need this medication at this time. Patient reports her anxiety has been ok. Pt request to decline their request.

## 2025-05-27 ENCOUNTER — HOSPITAL ENCOUNTER (OUTPATIENT)
Age: 70
Discharge: HOME | End: 2025-05-27
Payer: MEDICARE

## 2025-05-27 DIAGNOSIS — E78.5 HYPERLIPIDEMIA, UNSPECIFIED HYPERLIPIDEMIA TYPE: ICD-10-CM

## 2025-05-27 DIAGNOSIS — R73.01 IMPAIRED FASTING GLUCOSE: ICD-10-CM

## 2025-05-27 DIAGNOSIS — E03.9 HYPOTHYROIDISM, UNSPECIFIED TYPE: ICD-10-CM

## 2025-05-27 LAB
ALBUMIN SERPL-MCNC: 3.9 G/DL (ref 3.5–5.2)
ALBUMIN/GLOB SERPL: 1.3 {RATIO} (ref 1–2.5)
ALP SERPL-CCNC: 81 U/L (ref 35–104)
ALT SERPL-CCNC: 16 U/L (ref 10–35)
ANION GAP SERPL CALCULATED.3IONS-SCNC: 10 MMOL/L (ref 9–16)
AST SERPL-CCNC: 20 U/L (ref 10–35)
BILIRUB SERPL-MCNC: 0.4 MG/DL (ref 0–1.2)
BUN SERPL-MCNC: 15 MG/DL (ref 8–23)
BUN/CREAT SERPL: 21 (ref 9–20)
CALCIUM SERPL-MCNC: 9.3 MG/DL (ref 8.6–10.4)
CHLORIDE SERPL-SCNC: 107 MMOL/L (ref 98–107)
CHOLEST SERPL-MCNC: 177 MG/DL (ref 0–199)
CHOLESTEROL/HDL RATIO: 4.8
CO2 SERPL-SCNC: 23 MMOL/L (ref 20–31)
CREAT SERPL-MCNC: 0.7 MG/DL (ref 0.6–0.9)
EST. AVERAGE GLUCOSE BLD GHB EST-MCNC: 108 MG/DL
GFR, ESTIMATED: >90 ML/MIN/1.73M2
GLUCOSE SERPL-MCNC: 106 MG/DL (ref 74–99)
HBA1C MFR BLD: 5.4 % (ref 4–6)
HDLC SERPL-MCNC: 37 MG/DL
LDLC SERPL CALC-MCNC: 112 MG/DL (ref 0–100)
POTASSIUM SERPL-SCNC: 4.1 MMOL/L (ref 3.7–5.3)
PROT SERPL-MCNC: 6.8 G/DL (ref 6.6–8.7)
SODIUM SERPL-SCNC: 140 MMOL/L (ref 136–145)
T4 FREE SERPL-MCNC: 1 NG/DL (ref 0.92–1.68)
TRIGL SERPL-MCNC: 141 MG/DL
TSH SERPL DL<=0.05 MIU/L-ACNC: 4.33 UIU/ML (ref 0.27–4.2)
VLDLC SERPL CALC-MCNC: 28 MG/DL (ref 1–30)

## 2025-05-27 PROCEDURE — 84443 ASSAY THYROID STIM HORMONE: CPT

## 2025-05-27 PROCEDURE — 80053 COMPREHEN METABOLIC PANEL: CPT

## 2025-05-27 PROCEDURE — 83036 HEMOGLOBIN GLYCOSYLATED A1C: CPT

## 2025-05-27 PROCEDURE — 80061 LIPID PANEL: CPT

## 2025-05-27 PROCEDURE — 84439 ASSAY OF FREE THYROXINE: CPT

## 2025-05-27 PROCEDURE — 36415 COLL VENOUS BLD VENIPUNCTURE: CPT

## 2025-05-27 SDOH — HEALTH STABILITY: PHYSICAL HEALTH: ON AVERAGE, HOW MANY DAYS PER WEEK DO YOU ENGAGE IN MODERATE TO STRENUOUS EXERCISE (LIKE A BRISK WALK)?: 0 DAYS

## 2025-05-27 SDOH — ECONOMIC STABILITY: INCOME INSECURITY: IN THE LAST 12 MONTHS, WAS THERE A TIME WHEN YOU WERE NOT ABLE TO PAY THE MORTGAGE OR RENT ON TIME?: NO

## 2025-05-27 SDOH — ECONOMIC STABILITY: FOOD INSECURITY: WITHIN THE PAST 12 MONTHS, THE FOOD YOU BOUGHT JUST DIDN'T LAST AND YOU DIDN'T HAVE MONEY TO GET MORE.: PATIENT DECLINED

## 2025-05-27 SDOH — HEALTH STABILITY: PHYSICAL HEALTH: ON AVERAGE, HOW MANY MINUTES DO YOU ENGAGE IN EXERCISE AT THIS LEVEL?: 0 MIN

## 2025-05-27 SDOH — ECONOMIC STABILITY: TRANSPORTATION INSECURITY
IN THE PAST 12 MONTHS, HAS THE LACK OF TRANSPORTATION KEPT YOU FROM MEDICAL APPOINTMENTS OR FROM GETTING MEDICATIONS?: NO

## 2025-05-27 SDOH — ECONOMIC STABILITY: FOOD INSECURITY: WITHIN THE PAST 12 MONTHS, YOU WORRIED THAT YOUR FOOD WOULD RUN OUT BEFORE YOU GOT MONEY TO BUY MORE.: PATIENT DECLINED

## 2025-05-27 ASSESSMENT — PATIENT HEALTH QUESTIONNAIRE - PHQ9
8. MOVING OR SPEAKING SO SLOWLY THAT OTHER PEOPLE COULD HAVE NOTICED. OR THE OPPOSITE - BEING SO FIDGETY OR RESTLESS THAT YOU HAVE BEEN MOVING AROUND A LOT MORE THAN USUAL: NOT AT ALL
3. TROUBLE FALLING OR STAYING ASLEEP: SEVERAL DAYS
SUM OF ALL RESPONSES TO PHQ QUESTIONS 1-9: 2
8. MOVING OR SPEAKING SO SLOWLY THAT OTHER PEOPLE COULD HAVE NOTICED. OR THE OPPOSITE, BEING SO FIGETY OR RESTLESS THAT YOU HAVE BEEN MOVING AROUND A LOT MORE THAN USUAL: NOT AT ALL
3. TROUBLE FALLING OR STAYING ASLEEP: SEVERAL DAYS
9. THOUGHTS THAT YOU WOULD BE BETTER OFF DEAD, OR OF HURTING YOURSELF: NOT AT ALL
5. POOR APPETITE OR OVEREATING: SEVERAL DAYS
SUM OF ALL RESPONSES TO PHQ QUESTIONS 1-9: 7
4. FEELING TIRED OR HAVING LITTLE ENERGY: SEVERAL DAYS
1. LITTLE INTEREST OR PLEASURE IN DOING THINGS: SEVERAL DAYS
6. FEELING BAD ABOUT YOURSELF - OR THAT YOU ARE A FAILURE OR HAVE LET YOURSELF OR YOUR FAMILY DOWN: SEVERAL DAYS
5. POOR APPETITE OR OVEREATING: SEVERAL DAYS
SUM OF ALL RESPONSES TO PHQ QUESTIONS 1-9: 2
2. FEELING DOWN, DEPRESSED OR HOPELESS: SEVERAL DAYS
1. LITTLE INTEREST OR PLEASURE IN DOING THINGS: MORE THAN HALF THE DAYS
SUM OF ALL RESPONSES TO PHQ QUESTIONS 1-9: 2
4. FEELING TIRED OR HAVING LITTLE ENERGY: SEVERAL DAYS
10. IF YOU CHECKED OFF ANY PROBLEMS, HOW DIFFICULT HAVE THESE PROBLEMS MADE IT FOR YOU TO DO YOUR WORK, TAKE CARE OF THINGS AT HOME, OR GET ALONG WITH OTHER PEOPLE: SOMEWHAT DIFFICULT
10. IF YOU CHECKED OFF ANY PROBLEMS, HOW DIFFICULT HAVE THESE PROBLEMS MADE IT FOR YOU TO DO YOUR WORK, TAKE CARE OF THINGS AT HOME, OR GET ALONG WITH OTHER PEOPLE: SOMEWHAT DIFFICULT
SUM OF ALL RESPONSES TO PHQ QUESTIONS 1-9: 7
2. FEELING DOWN, DEPRESSED OR HOPELESS: SEVERAL DAYS
SUM OF ALL RESPONSES TO PHQ QUESTIONS 1-9: 7
6. FEELING BAD ABOUT YOURSELF - OR THAT YOU ARE A FAILURE OR HAVE LET YOURSELF OR YOUR FAMILY DOWN: SEVERAL DAYS
7. TROUBLE CONCENTRATING ON THINGS, SUCH AS READING THE NEWSPAPER OR WATCHING TELEVISION: NOT AT ALL
1. LITTLE INTEREST OR PLEASURE IN DOING THINGS: MORE THAN HALF THE DAYS
SUM OF ALL RESPONSES TO PHQ QUESTIONS 1-9: 2
7. TROUBLE CONCENTRATING ON THINGS, SUCH AS READING THE NEWSPAPER OR WATCHING TELEVISION: NOT AT ALL
2. FEELING DOWN, DEPRESSED OR HOPELESS: SEVERAL DAYS
9. THOUGHTS THAT YOU WOULD BE BETTER OFF DEAD, OR OF HURTING YOURSELF: NOT AT ALL

## 2025-05-27 ASSESSMENT — LIFESTYLE VARIABLES
HOW MANY STANDARD DRINKS CONTAINING ALCOHOL DO YOU HAVE ON A TYPICAL DAY: PATIENT DECLINED
HOW OFTEN DO YOU HAVE A DRINK CONTAINING ALCOHOL: 98
HOW OFTEN DO YOU HAVE SIX OR MORE DRINKS ON ONE OCCASION: 1
HOW MANY STANDARD DRINKS CONTAINING ALCOHOL DO YOU HAVE ON A TYPICAL DAY: 98
HOW OFTEN DO YOU HAVE A DRINK CONTAINING ALCOHOL: PATIENT DECLINED

## 2025-05-30 ENCOUNTER — OFFICE VISIT (OUTPATIENT)
Dept: FAMILY MEDICINE CLINIC | Age: 70
End: 2025-05-30

## 2025-05-30 VITALS
SYSTOLIC BLOOD PRESSURE: 138 MMHG | BODY MASS INDEX: 51.91 KG/M2 | DIASTOLIC BLOOD PRESSURE: 88 MMHG | OXYGEN SATURATION: 97 % | TEMPERATURE: 97.2 F | WEIGHT: 293 LBS | RESPIRATION RATE: 16 BRPM | HEART RATE: 71 BPM | HEIGHT: 63 IN

## 2025-05-30 DIAGNOSIS — I50.32 CHRONIC DIASTOLIC HEART FAILURE (HCC): ICD-10-CM

## 2025-05-30 DIAGNOSIS — R06.83 SNORING: ICD-10-CM

## 2025-05-30 DIAGNOSIS — I10 ESSENTIAL HYPERTENSION: Primary | ICD-10-CM

## 2025-05-30 DIAGNOSIS — R06.09 DOE (DYSPNEA ON EXERTION): ICD-10-CM

## 2025-05-30 DIAGNOSIS — E03.9 HYPOTHYROIDISM, UNSPECIFIED TYPE: ICD-10-CM

## 2025-05-30 DIAGNOSIS — R60.0 BILATERAL EDEMA OF LOWER EXTREMITY: ICD-10-CM

## 2025-05-30 DIAGNOSIS — G47.10 HYPERSOMNOLENCE: ICD-10-CM

## 2025-05-30 DIAGNOSIS — E78.5 HYPERLIPIDEMIA, UNSPECIFIED HYPERLIPIDEMIA TYPE: ICD-10-CM

## 2025-05-30 DIAGNOSIS — R73.01 IMPAIRED FASTING GLUCOSE: ICD-10-CM

## 2025-05-30 RX ORDER — ATENOLOL 50 MG/1
50 TABLET ORAL DAILY
Qty: 90 TABLET | Refills: 3 | Status: CANCELLED | OUTPATIENT
Start: 2025-05-30

## 2025-05-30 RX ORDER — LOSARTAN POTASSIUM 25 MG/1
25 TABLET ORAL DAILY
Qty: 90 TABLET | Refills: 3 | Status: CANCELLED | OUTPATIENT
Start: 2025-05-30

## 2025-05-30 RX ORDER — FUROSEMIDE 20 MG/1
20 TABLET ORAL DAILY PRN
Qty: 90 TABLET | Refills: 1 | Status: CANCELLED | OUTPATIENT
Start: 2025-05-30

## 2025-05-30 RX ORDER — LEVOTHYROXINE SODIUM 25 UG/1
12.5 TABLET ORAL DAILY
Qty: 45 TABLET | Refills: 3 | Status: CANCELLED | OUTPATIENT
Start: 2025-05-30

## 2025-05-30 RX ORDER — ATORVASTATIN CALCIUM 20 MG/1
20 TABLET, FILM COATED ORAL DAILY
Qty: 90 TABLET | Refills: 2 | Status: CANCELLED | OUTPATIENT
Start: 2025-05-30

## 2025-05-30 NOTE — PROGRESS NOTES
[facility]  [address] [place] [address]  [phone]      Sharlene Nava is a 69 y.o. female who presents today for her medical conditions/complaints as noted below.  Sharlene Nava is c/o of Hypothyroidism and Hypertension      HPI:     History of Present Illness  The patient is a 69-year-old female who presents today for follow-up on blood pressure and thyroid.    She has been experiencing persistent redness in her thumb for approximately 2 weeks, accompanied by pain. She reports no history of trauma to the area. The thumb is not causing any pain during wrist movement and does not appear to be filled with fluid. She has been applying a moisturizer containing vitamin E to the affected area.    She has expressed concerns about her weight and has attempted various methods to manage it, including the use of vinegar and a regimen involving pink salt, warm water, and lemon juice. She is interested in losing weight for health reasons and to improve mobility but is hesitant to try Ozempic due to potential side effects. She has not undergone a sleep study but reports frequent awakenings at night, snoring, and daytime fatigue. She also experiences morning headaches and feels more rested when sleeping in a chair. She is unsure if she experiences pauses in breathing during sleep. Additionally, she reports episodes of heavy breathing after minimal exertion, such as walking from her house to the front door.    She has been adhering to her Synthroid regimen, with the exception of periods of travel. She has recently refilled all her medications for a 90-day supply and prefers to order them as needed. She has been using a diuretic as needed, with the last dose taken 1 to 2 months ago.    She has been making efforts to increase her water intake and reduce her consumption of soda. She has been adding green tea with citrus to her water for flavor.    She has been experiencing leg

## 2025-06-02 ENCOUNTER — TELEPHONE (OUTPATIENT)
Dept: FAMILY MEDICINE CLINIC | Age: 70
End: 2025-06-02

## 2025-06-02 ENCOUNTER — TELEMEDICINE (OUTPATIENT)
Dept: FAMILY MEDICINE CLINIC | Age: 70
End: 2025-06-02
Payer: MEDICARE

## 2025-06-02 DIAGNOSIS — Z00.00 MEDICARE ANNUAL WELLNESS VISIT, SUBSEQUENT: Primary | ICD-10-CM

## 2025-06-02 PROCEDURE — 1123F ACP DISCUSS/DSCN MKR DOCD: CPT | Performed by: FAMILY MEDICINE

## 2025-06-02 PROCEDURE — 1159F MED LIST DOCD IN RCRD: CPT | Performed by: FAMILY MEDICINE

## 2025-06-02 PROCEDURE — 3017F COLORECTAL CA SCREEN DOC REV: CPT | Performed by: FAMILY MEDICINE

## 2025-06-02 PROCEDURE — G0439 PPPS, SUBSEQ VISIT: HCPCS | Performed by: FAMILY MEDICINE

## 2025-06-02 NOTE — TELEPHONE ENCOUNTER
Writer did not call pt.     Pt states she is not paying 1400 for Zepbound if insurance won't cover it or only covers partial. No denial received yet to office. Will let KB know once received.    Completed AWV at this time.

## 2025-06-02 NOTE — PATIENT INSTRUCTIONS
your time.  You may not be where you want to be. But you're in the process of getting there. Everyone starts somewhere.  How can you find safe ways to stay active?  Talk with your doctor about any physical challenges you're facing. Make a plan with your doctor if you have a health problem or aren't sure how to get started with activity.  If you're already active, ask your doctor if there is anything you should change to stay safe as your body and health change.  If you tend to feel dizzy after you take medicine, avoid activity at that time. Try being active before you take your medicine. This will reduce your risk of falls.  If you plan to be active at home, make sure to clear your space before you get started. Remove things like TV cords, coffee tables, and throw rugs. It's safest to have plenty of space to move freely.  The key to getting more active is to take it slow and steady. Try to improve only a little bit at a time. Pick just one area to improve on at first. And if an activity hurts, stop and talk to your doctor.  Where can you learn more?  Go to https://www.[employer].net/patientEd and enter P600 to learn more about \"Learning About Being Active as an Older Adult.\"  Current as of: July 31, 2024  Content Version: 14.4  © 2024-2025 [employer].   Care instructions adapted under license by [employer]. If you have questions about a medical condition or this instruction, always ask your healthcare professional. [employer] disclaims any warranty or liability for your use of this information.         Learning About Activities of Daily Living  What are activities of daily living?     Activities of daily living (ADLs) are the basic self-care tasks you do every day. These include eating, bathing, dressing, and moving around.  As you age, and if you have health problems, you may find that it's harder to do some of these tasks. If so, your doctor can suggest ideas that may help.  To measure

## 2025-06-02 NOTE — PROGRESS NOTES
the patient is located as indicated above. If you are not or unsure, please re-schedule the visit: Yes, I confirm.      Total time spent for this encounter:  11 minutes      Controlled Substance Monitoring:    Acute and Chronic Pain Monitoring:   RX Monitoring Periodic Controlled Substance Monitoring   10/11/2023   8:27 PM No signs of potential drug abuse or diversion identified.           --Magi Oneill, DO on 6/9/2025 at 2:52 PM    An electronic signature was used to authenticate this note.

## 2025-06-11 DIAGNOSIS — E03.9 HYPOTHYROIDISM, UNSPECIFIED TYPE: ICD-10-CM

## 2025-06-11 NOTE — TELEPHONE ENCOUNTER
Sharlene called requesting a refill of the below medication which has been pended for you:     Requested Prescriptions     Pending Prescriptions Disp Refills    levothyroxine (SYNTHROID) 25 MCG tablet [Pharmacy Med Name: Levothyroxine Sodium 25 MCG Oral Tablet] 45 tablet 3     Sig: TAKE ONE-HALF TABLET BY MOUTH  DAILY       Last Appointment Date: 6/2/2025  Next Appointment Date: 12/1/2025    Allergies   Allergen Reactions    Benazepril Other (See Comments)     cough    Lisinopril Other (See Comments)     Cough      Other/Food      Generic Prozac \"made me feel weird\"     
English

## 2025-06-14 RX ORDER — LEVOTHYROXINE SODIUM 25 UG/1
12.5 TABLET ORAL DAILY
Qty: 45 TABLET | Refills: 3 | Status: SHIPPED | OUTPATIENT
Start: 2025-06-14

## 2025-06-17 DIAGNOSIS — I10 ESSENTIAL HYPERTENSION: ICD-10-CM

## 2025-06-17 DIAGNOSIS — E78.5 HYPERLIPIDEMIA, UNSPECIFIED HYPERLIPIDEMIA TYPE: ICD-10-CM

## 2025-06-17 NOTE — TELEPHONE ENCOUNTER
Sharlene called requesting a refill of the below medication which has been pended for you:     Requested Prescriptions     Pending Prescriptions Disp Refills    losartan (COZAAR) 25 MG tablet [Pharmacy Med Name: Losartan Potassium 25 MG Oral Tablet] 90 tablet 3     Sig: TAKE 1 TABLET BY MOUTH DAILY    atorvastatin (LIPITOR) 20 MG tablet [Pharmacy Med Name: Atorvastatin Calcium 20 MG Oral Tablet] 90 tablet 3     Sig: TAKE 1 TABLET BY MOUTH ONCE  DAILY    atenolol (TENORMIN) 50 MG tablet [Pharmacy Med Name: ATENOLOL  50MG  TAB] 90 tablet 3     Sig: TAKE 1 TABLET BY MOUTH DAILY       Last Appointment Date: 6/2/2025  Next Appointment Date: 12/1/2025    Allergies   Allergen Reactions    Benazepril Other (See Comments)     cough    Lisinopril Other (See Comments)     Cough      Other      Generic Prozac \"made me feel weird\"

## 2025-06-19 RX ORDER — ATENOLOL 50 MG/1
50 TABLET ORAL DAILY
Qty: 90 TABLET | Refills: 3 | Status: SHIPPED | OUTPATIENT
Start: 2025-06-19

## 2025-06-19 RX ORDER — LOSARTAN POTASSIUM 25 MG/1
25 TABLET ORAL DAILY
Qty: 90 TABLET | Refills: 3 | Status: SHIPPED | OUTPATIENT
Start: 2025-06-19

## 2025-06-19 RX ORDER — ATORVASTATIN CALCIUM 20 MG/1
20 TABLET, FILM COATED ORAL DAILY
Qty: 90 TABLET | Refills: 3 | Status: SHIPPED | OUTPATIENT
Start: 2025-06-19

## 2025-07-14 ENCOUNTER — HOSPITAL ENCOUNTER (OUTPATIENT)
Dept: SLEEP CENTER | Age: 70
Discharge: HOME | End: 2025-07-16
Attending: FAMILY MEDICINE
Payer: MEDICARE

## 2025-07-14 ENCOUNTER — HOSPITAL ENCOUNTER (OUTPATIENT)
Age: 70
Discharge: HOME | End: 2025-07-16
Attending: FAMILY MEDICINE
Payer: MEDICARE

## 2025-07-14 VITALS — HEIGHT: 62 IN | WEIGHT: 293 LBS | BODY MASS INDEX: 53.92 KG/M2

## 2025-07-14 DIAGNOSIS — G47.10 HYPERSOMNOLENCE: ICD-10-CM

## 2025-07-14 DIAGNOSIS — R06.09 DOE (DYSPNEA ON EXERTION): ICD-10-CM

## 2025-07-14 DIAGNOSIS — R06.83 SNORING: ICD-10-CM

## 2025-07-14 LAB
ECHO AO ROOT DIAM: 3.2 CM
ECHO AO ROOT INDEX: 1.42 CM/M2
ECHO AV AREA PEAK VELOCITY: 1.5 CM2
ECHO AV AREA VTI: 1.5 CM2
ECHO AV AREA/BSA PEAK VELOCITY: 0.7 CM2/M2
ECHO AV AREA/BSA VTI: 0.7 CM2/M2
ECHO AV MEAN GRADIENT: 8 MMHG
ECHO AV MEAN VELOCITY: 1.3 M/S
ECHO AV PEAK GRADIENT: 14 MMHG
ECHO AV PEAK VELOCITY: 1.9 M/S
ECHO AV VELOCITY RATIO: 0.53
ECHO AV VTI: 42.5 CM
ECHO BSA: 2.41 M2
ECHO EST RA PRESSURE: 3 MMHG
ECHO IVC PROX: 1.6 CM
ECHO LA AREA 2C: 23.4 CM2
ECHO LA AREA 4C: 17.9 CM2
ECHO LA DIAMETER INDEX: 1.64 CM/M2
ECHO LA DIAMETER: 3.7 CM
ECHO LA MAJOR AXIS: 4.7 CM
ECHO LA MINOR AXIS: 5.7 CM
ECHO LA TO AORTIC ROOT RATIO: 1.16
ECHO LA VOL BP: 72 ML (ref 22–52)
ECHO LA VOL MOD A2C: 80 ML (ref 22–52)
ECHO LA VOL MOD A4C: 53 ML (ref 22–52)
ECHO LA VOL/BSA BIPLANE: 32 ML/M2 (ref 16–34)
ECHO LA VOLUME INDEX MOD A2C: 36 ML/M2 (ref 16–34)
ECHO LA VOLUME INDEX MOD A4C: 24 ML/M2 (ref 16–34)
ECHO LV E' LATERAL VELOCITY: 4.79 CM/S
ECHO LV E' SEPTAL VELOCITY: 4.13 CM/S
ECHO LV EDV A2C: 84 ML
ECHO LV EDV A4C: 78 ML
ECHO LV EDV INDEX A4C: 35 ML/M2
ECHO LV EDV NDEX A2C: 37 ML/M2
ECHO LV EF PHYSICIAN: 53 %
ECHO LV EJECTION FRACTION A2C: 57 %
ECHO LV EJECTION FRACTION A4C: 45 %
ECHO LV EJECTION FRACTION BIPLANE: 53 % (ref 55–100)
ECHO LV ESV A2C: 36 ML
ECHO LV ESV A4C: 43 ML
ECHO LV ESV INDEX A2C: 16 ML/M2
ECHO LV ESV INDEX A4C: 19 ML/M2
ECHO LV FRACTIONAL SHORTENING: 32 % (ref 28–44)
ECHO LV INTERNAL DIMENSION DIASTOLE INDEX: 1.82 CM/M2
ECHO LV INTERNAL DIMENSION DIASTOLIC: 4.1 CM (ref 3.9–5.3)
ECHO LV INTERNAL DIMENSION SYSTOLIC INDEX: 1.24 CM/M2
ECHO LV INTERNAL DIMENSION SYSTOLIC: 2.8 CM
ECHO LV IVSD: 1.3 CM (ref 0.6–0.9)
ECHO LV MASS 2D: 182.5 G (ref 67–162)
ECHO LV MASS INDEX 2D: 81.1 G/M2 (ref 43–95)
ECHO LV POSTERIOR WALL DIASTOLIC: 1.2 CM (ref 0.6–0.9)
ECHO LV RELATIVE WALL THICKNESS RATIO: 0.59
ECHO LVOT AREA: 2.8 CM2
ECHO LVOT AV VTI INDEX: 0.52
ECHO LVOT DIAM: 1.9 CM
ECHO LVOT MEAN GRADIENT: 2 MMHG
ECHO LVOT PEAK GRADIENT: 4 MMHG
ECHO LVOT PEAK VELOCITY: 1 M/S
ECHO LVOT STROKE VOLUME INDEX: 27.6 ML/M2
ECHO LVOT SV: 62.1 ML
ECHO LVOT VTI: 21.9 CM
ECHO MV A VELOCITY: 1.05 M/S
ECHO MV E DECELERATION TIME (DT): 235 MS
ECHO MV E VELOCITY: 0.76 M/S
ECHO MV E/A RATIO: 0.72
ECHO MV E/E' LATERAL: 15.87
ECHO MV E/E' RATIO (AVERAGED): 17.13
ECHO MV E/E' SEPTAL: 18.4
ECHO RV TAPSE: 1.5 CM (ref 1.7–?)

## 2025-07-14 PROCEDURE — 93306 TTE W/DOPPLER COMPLETE: CPT

## 2025-07-14 PROCEDURE — 93306 TTE W/DOPPLER COMPLETE: CPT | Performed by: INTERNAL MEDICINE

## 2025-07-14 PROCEDURE — 95810 POLYSOM 6/> YRS 4/> PARAM: CPT

## 2025-07-21 ENCOUNTER — TELEPHONE (OUTPATIENT)
Dept: SLEEP CENTER | Age: 70
End: 2025-07-21

## 2025-07-21 DIAGNOSIS — G47.33 OSA (OBSTRUCTIVE SLEEP APNEA): Primary | ICD-10-CM

## 2025-07-21 NOTE — TELEPHONE ENCOUNTER
Hi Dr. Oneill, This patient needs to return for treatment of Cpap. If you could put an order in ofr a SLE1 with diag. of [facility] that would be great. Thank you!

## 2025-07-22 ENCOUNTER — PATIENT MESSAGE (OUTPATIENT)
Dept: FAMILY MEDICINE CLINIC | Age: 70
End: 2025-07-22

## 2025-08-11 ENCOUNTER — HOSPITAL ENCOUNTER (OUTPATIENT)
Dept: SLEEP CENTER | Age: 70
Discharge: HOME | End: 2025-08-13
Payer: MEDICARE

## 2025-08-11 DIAGNOSIS — G47.33 OSA (OBSTRUCTIVE SLEEP APNEA): ICD-10-CM

## 2025-08-11 PROCEDURE — 95811 POLYSOM 6/>YRS CPAP 4/> PARM: CPT

## 2025-08-15 ENCOUNTER — PATIENT MESSAGE (OUTPATIENT)
Dept: FAMILY MEDICINE CLINIC | Age: 70
End: 2025-08-15

## 2025-08-15 DIAGNOSIS — G47.33 OBSTRUCTIVE SLEEP APNEA: Primary | ICD-10-CM

## 2025-08-22 DIAGNOSIS — G47.33 OSA (OBSTRUCTIVE SLEEP APNEA): Primary | ICD-10-CM

## (undated) DEVICE — LINE SAMP O2 6.5FT W/FEMALE CONN F/ADULT CAPNOLINE PLUS

## (undated) DEVICE — NEPTUNE E-SEP SMOKE EVACUATION PENCIL, COATED, 70MM BLADE, PUSH BUTTON SWITCH: Brand: NEPTUNE E-SEP

## (undated) DEVICE — GOWN,SIRUS,NONRNF,SETINSLV,XL,20/CS: Brand: MEDLINE

## (undated) DEVICE — COUNTER NDL 10 COUNT HLD 20 FOAM BLK SGL MAG

## (undated) DEVICE — 60 ML SYRINGE REGULAR TIP: Brand: MONOJECT

## (undated) DEVICE — SYRINGE MED 10ML LUERLOCK TIP W/O SFTY DISP

## (undated) DEVICE — CONNECTOR TBNG AUX H2O JET DISP FOR OLY 160/180 SER

## (undated) DEVICE — SUTURE ENDOLOOP SZ 0 L18IN ABSRB VLT LIG SLDE KNOT VCRL

## (undated) DEVICE — GLOVE ORANGE PI 8   MSG9080

## (undated) DEVICE — NEEDLE HYPO 25GA L1.5IN BLU POLYPR HUB S STL REG BVL STR

## (undated) DEVICE — ELECTRODE PT RET AD L9FT HI MOIST COND ADH HYDRGEL CORDED

## (undated) DEVICE — FORCEPS BX L240CM JAW DIA2.4MM ORNG L CAP W/ NDL DISP RAD

## (undated) DEVICE — MARKER,SKIN,WI/RULER AND LABELS: Brand: MEDLINE

## (undated) DEVICE — ST CHARLES PERI-GYN: Brand: MEDLINE INDUSTRIES, INC.

## (undated) DEVICE — MERCY DEFIANCE ENDO KIT: Brand: MEDLINE INDUSTRIES, INC.

## (undated) DEVICE — YANKAUER,BULB TIP,W/O VENT,RIGID,STERILE: Brand: MEDLINE

## (undated) DEVICE — INTENDED FOR TISSUE SEPARATION, AND OTHER PROCEDURES THAT REQUIRE A SHARP SURGICAL BLADE TO PUNCTURE OR CUT.: Brand: BARD-PARKER ® CARBON RIB-BACK BLADES

## (undated) DEVICE — SINGLE PORT MANIFOLD: Brand: NEPTUNE 2

## (undated) DEVICE — NEEDLE, QUINCKE, 22GX5": Brand: MEDLINE

## (undated) DEVICE — 1200CC GUARDIAN II: Brand: GUARDIAN